# Patient Record
Sex: FEMALE | Race: WHITE | URBAN - METROPOLITAN AREA
[De-identification: names, ages, dates, MRNs, and addresses within clinical notes are randomized per-mention and may not be internally consistent; named-entity substitution may affect disease eponyms.]

---
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
## 2023-01-09 ENCOUNTER — OUTPATIENT (OUTPATIENT)
Dept: OUTPATIENT SERVICES | Age: 18
LOS: 1 days | Discharge: HOME | End: 2023-01-09

## 2023-01-09 DIAGNOSIS — Q69.9 POLYDACTYLY, UNSPECIFIED: Chronic | ICD-10-CM

## 2023-01-11 ENCOUNTER — RESULT REVIEW (OUTPATIENT)
Age: 18
End: 2023-01-11

## 2023-01-11 ENCOUNTER — APPOINTMENT (OUTPATIENT)
Dept: PEDIATRIC HEMATOLOGY/ONCOLOGY | Facility: CLINIC | Age: 18
End: 2023-01-11
Payer: COMMERCIAL

## 2023-01-11 VITALS
BODY MASS INDEX: 23.66 KG/M2 | WEIGHT: 136.91 LBS | TEMPERATURE: 98.24 F | OXYGEN SATURATION: 100 % | HEIGHT: 63.66 IN | RESPIRATION RATE: 24 BRPM | DIASTOLIC BLOOD PRESSURE: 64 MMHG | SYSTOLIC BLOOD PRESSURE: 112 MMHG | HEART RATE: 88 BPM

## 2023-01-11 LAB
ALBUMIN SERPL ELPH-MCNC: 4.7 G/DL — SIGNIFICANT CHANGE UP (ref 3.3–5)
ALP SERPL-CCNC: 60 U/L — SIGNIFICANT CHANGE UP (ref 40–120)
ALT FLD-CCNC: 17 U/L — SIGNIFICANT CHANGE UP (ref 4–33)
ANION GAP SERPL CALC-SCNC: 12 MMOL/L — SIGNIFICANT CHANGE UP (ref 7–14)
AST SERPL-CCNC: 25 U/L — SIGNIFICANT CHANGE UP (ref 4–32)
BASOPHILS # BLD AUTO: 0.03 K/UL — SIGNIFICANT CHANGE UP (ref 0–0.2)
BASOPHILS NFR BLD AUTO: 0.9 % — SIGNIFICANT CHANGE UP (ref 0–2)
BILIRUB SERPL-MCNC: 0.6 MG/DL — SIGNIFICANT CHANGE UP (ref 0.2–1.2)
BUN SERPL-MCNC: 8 MG/DL — SIGNIFICANT CHANGE UP (ref 7–23)
CALCIUM SERPL-MCNC: 9.3 MG/DL — SIGNIFICANT CHANGE UP (ref 8.4–10.5)
CHLORIDE SERPL-SCNC: 103 MMOL/L — SIGNIFICANT CHANGE UP (ref 98–107)
CO2 SERPL-SCNC: 25 MMOL/L — SIGNIFICANT CHANGE UP (ref 22–31)
CREAT SERPL-MCNC: 0.64 MG/DL — SIGNIFICANT CHANGE UP (ref 0.5–1.3)
EGFR: 130 ML/MIN/1.73M2 — SIGNIFICANT CHANGE UP
EOSINOPHIL # BLD AUTO: 0.04 K/UL — SIGNIFICANT CHANGE UP (ref 0–0.5)
EOSINOPHIL NFR BLD AUTO: 1.2 % — SIGNIFICANT CHANGE UP (ref 0–6)
FERRITIN SERPL-MCNC: 7 NG/ML — LOW (ref 15–150)
GLUCOSE SERPL-MCNC: 72 MG/DL — SIGNIFICANT CHANGE UP (ref 70–99)
HCT VFR BLD CALC: 33.7 % — LOW (ref 34.5–45)
HGB BLD-MCNC: 10.5 G/DL — LOW (ref 11.5–15.5)
IANC: 1.05 K/UL — LOW (ref 1.8–7.4)
IMM GRANULOCYTES NFR BLD AUTO: 0.3 % — SIGNIFICANT CHANGE UP (ref 0–0.9)
IRON SATN MFR SERPL: 14 % — SIGNIFICANT CHANGE UP (ref 14–50)
IRON SATN MFR SERPL: 55 UG/DL — SIGNIFICANT CHANGE UP (ref 30–160)
LYMPHOCYTES # BLD AUTO: 1.96 K/UL — SIGNIFICANT CHANGE UP (ref 1–3.3)
LYMPHOCYTES # BLD AUTO: 58.3 % — HIGH (ref 13–44)
MCHC RBC-ENTMCNC: 24.9 PG — LOW (ref 27–34)
MCHC RBC-ENTMCNC: 31.2 GM/DL — LOW (ref 32–36)
MCV RBC AUTO: 80 FL — SIGNIFICANT CHANGE UP (ref 80–100)
MONOCYTES # BLD AUTO: 0.27 K/UL — SIGNIFICANT CHANGE UP (ref 0–0.9)
MONOCYTES NFR BLD AUTO: 8 % — SIGNIFICANT CHANGE UP (ref 2–14)
NEUTROPHILS # BLD AUTO: 1.05 K/UL — LOW (ref 1.8–7.4)
NEUTROPHILS NFR BLD AUTO: 31.3 % — LOW (ref 43–77)
NRBC # BLD: 0 /100 WBCS — SIGNIFICANT CHANGE UP (ref 0–0)
PLATELET # BLD AUTO: 122 K/UL — LOW (ref 150–400)
POTASSIUM SERPL-MCNC: 4 MMOL/L — SIGNIFICANT CHANGE UP (ref 3.5–5.3)
POTASSIUM SERPL-SCNC: 4 MMOL/L — SIGNIFICANT CHANGE UP (ref 3.5–5.3)
PROT SERPL-MCNC: 7.1 G/DL — SIGNIFICANT CHANGE UP (ref 6–8.3)
RBC # BLD: 4.21 M/UL — SIGNIFICANT CHANGE UP (ref 3.8–5.2)
RBC # BLD: 4.21 M/UL — SIGNIFICANT CHANGE UP (ref 3.8–5.2)
RBC # FLD: 16.2 % — HIGH (ref 10.3–14.5)
RETICS #: 39.2 K/UL — SIGNIFICANT CHANGE UP (ref 25–125)
RETICS/RBC NFR: 0.9 % — SIGNIFICANT CHANGE UP (ref 0.5–2.5)
SODIUM SERPL-SCNC: 140 MMOL/L — SIGNIFICANT CHANGE UP (ref 135–145)
TIBC SERPL-MCNC: 393 UG/DL — SIGNIFICANT CHANGE UP (ref 220–430)
UIBC SERPL-MCNC: 338 UG/DL — SIGNIFICANT CHANGE UP (ref 110–370)
WBC # BLD: 3.36 K/UL — LOW (ref 3.8–10.5)
WBC # FLD AUTO: 3.36 K/UL — LOW (ref 3.8–10.5)

## 2023-01-11 PROCEDURE — 99214 OFFICE O/P EST MOD 30 MIN: CPT

## 2023-01-12 DIAGNOSIS — Z51.11 ENCOUNTER FOR ANTINEOPLASTIC CHEMOTHERAPY: ICD-10-CM

## 2023-01-12 DIAGNOSIS — D70.1 AGRANULOCYTOSIS SECONDARY TO CANCER CHEMOTHERAPY: ICD-10-CM

## 2023-01-12 DIAGNOSIS — D50.8 OTHER IRON DEFICIENCY ANEMIAS: ICD-10-CM

## 2023-01-12 DIAGNOSIS — C92.10 CHRONIC MYELOID LEUKEMIA, BCR/ABL-POSITIVE, NOT HAVING ACHIEVED REMISSION: ICD-10-CM

## 2023-01-26 NOTE — CONSULT LETTER
[Dear  ___] : Dear  [unfilled], [Courtesy Letter:] : I had the pleasure of seeing your patient, [unfilled], in my office today. [Please see my note below.] : Please see my note below. [Consult Closing:] : Thank you very much for allowing me to participate in the care of this patient.  If you have any questions, please do not hesitate to contact me. [Sincerely,] : Sincerely, [FreeTextEntry2] : Meagan Fu MD\par [address]\[place] [address] [FreeTextEntry3] : RAGHU Dumas\par Attending Physician, Pediatric Hematology/Oncology\par [facility]\par , [employer]\par Email: [email or web address]\par

## 2023-01-26 NOTE — HISTORY OF PRESENT ILLNESS
[No Feeding Issues] : no feeding issues at this time [de-identified] : Sachin is a 17 year old girl who follows in our clinic for chronic myeloid leukemia and chemotherapy management.\par She was diagnosed in December 2019, treated with Dasatinib for one year but had a suboptimal response at 12 months (BM BCR ABL RT PCR 0.214%).\par She was then enrolled on [id number] - a Phase 1/2 trial for Bosutinib and began treatment on 3/5/21. She opted out of the study on 6/25/21 owing to GI side effects.\par She then restarted treatment with Dasatinib in August 2021 with dose reduction (80% dose)\par \par DISEASE SUMMARY:\par DIAGNOSIS: Chronic Myeloid Leukemia\par DIAGNOSED: 12/20/2019\par PHASE AT DIAGNOSIS: Chronic phase, 0.5% blasts in peripheral blood and bone marrow\par HEMPATH: Hypercellularity, hypo ablated nucleus containing megakaryocytes, increased M:E ratio of 4:1, 1% blasts present\par PERIPHERAL BLOOD BCR-ABL qRT PCR AT DIAGNOSIS: > 10% on International Scale\par \par DISEASE SURVEILLANCE:\par ON DASATINIB:\par 12/21 - Chronic phase CML diagnosed, 0.5% blasts, PB qRT PCR > 10% on IS\par 1 MONTH POST DIAGNOSIS - PB RT-PCR > 10% (57%)\par 3 MONTH POST DIAGNOSIS - PB RT-PCR 0.73%, BM RT-PCR 1.015%\par 6 MONTH POST DIAGNOSIS - PB RT-PCR 1.060%\par 12 MONTH POST DIAGNOSIS BONE MARROW RT -PCR 0.214% (Suboptimal/warning). Continues to have dose dependent hematologic toxicity. \par \par ON BOSUTINIB:\par 02/2021 - Enrolled on [id number] Phase 1/2 Bosutinib study due to suboptimal response and intolerance to Dasatinib, started treatment 3/5/21, phase 1 400mg/m2\par 03/2021 - Dose reduced Bosutinib due to AE, 350mg/m2 (restarted 3/25/21) \par 5/11/2021- Dose reduced bosutinib due to AEs, 300mg/m2\par Bone marrow testing after cycle 3 of bosutinib showed PCR of 0.2%. \par 6/25/21 - Opted out of study and discontinue Bosutinib due to GI toxicity\par \par RESTARTED DASATINIB at 80 mg daily in August 2021\par Jan 2022 - Bone marrow BCR ABL 1.1 %, increased from previously, due to non compliance\par Feb 2022 - Peripheral Blood BCR ABL 0.06%, now compliant. \par June 2022 - Peripheral blood BCR ABL 0.013% [de-identified] : Sachin is here for follow up of her CML\par She is doing well and is asymptomatic. In school.\par She continues to take her Dasatinib. She said that she is taking it daily every night consistently. No forgotten pills\par Her periods continue to be every month, last for 4-5 days and uses 4-5 pads/day\par She reports baseline activity and appetite

## 2023-01-26 NOTE — REVIEW OF SYSTEMS
[Negative] : Allergic/Immunologic [Immunizations are up to date by report] : Immunizations are up to date by report [de-identified] : no rash  [FreeTextEntry1] : CML on therapy [FreeTextEntry8] : see history

## 2023-02-21 ENCOUNTER — INPATIENT (INPATIENT)
Age: 18
LOS: 3 days | Discharge: HOME | End: 2023-02-25
Attending: STUDENT IN AN ORGANIZED HEALTH CARE EDUCATION/TRAINING PROGRAM | Admitting: STUDENT IN AN ORGANIZED HEALTH CARE EDUCATION/TRAINING PROGRAM
Payer: COMMERCIAL

## 2023-02-21 ENCOUNTER — TRANSCRIPTION ENCOUNTER (OUTPATIENT)
Age: 18
End: 2023-02-21

## 2023-02-21 ENCOUNTER — NON-APPOINTMENT (OUTPATIENT)
Age: 18
End: 2023-02-21

## 2023-02-21 VITALS
DIASTOLIC BLOOD PRESSURE: 70 MMHG | OXYGEN SATURATION: 100 % | RESPIRATION RATE: 24 BRPM | TEMPERATURE: 101 F | HEART RATE: 124 BPM | SYSTOLIC BLOOD PRESSURE: 104 MMHG | WEIGHT: 138.01 LBS

## 2023-02-21 DIAGNOSIS — Q69.9 POLYDACTYLY, UNSPECIFIED: Chronic | ICD-10-CM

## 2023-02-21 DIAGNOSIS — R50.9 FEVER, UNSPECIFIED: ICD-10-CM

## 2023-02-21 LAB
ALBUMIN SERPL ELPH-MCNC: 4.2 G/DL — SIGNIFICANT CHANGE UP (ref 3.3–5)
ALP SERPL-CCNC: 65 U/L — SIGNIFICANT CHANGE UP (ref 40–120)
ALT FLD-CCNC: 21 U/L — SIGNIFICANT CHANGE UP (ref 4–33)
ANION GAP SERPL CALC-SCNC: 13 MMOL/L — SIGNIFICANT CHANGE UP (ref 7–14)
ANISOCYTOSIS BLD QL: SLIGHT — SIGNIFICANT CHANGE UP
APPEARANCE UR: CLEAR — SIGNIFICANT CHANGE UP
AST SERPL-CCNC: 24 U/L — SIGNIFICANT CHANGE UP (ref 4–32)
B PERT DNA SPEC QL NAA+PROBE: SIGNIFICANT CHANGE UP
B PERT+PARAPERT DNA PNL SPEC NAA+PROBE: SIGNIFICANT CHANGE UP
BACTERIA # UR AUTO: ABNORMAL
BASOPHILS # BLD AUTO: 0.09 K/UL — SIGNIFICANT CHANGE UP (ref 0–0.2)
BASOPHILS NFR BLD AUTO: 1 % — SIGNIFICANT CHANGE UP (ref 0–2)
BILIRUB SERPL-MCNC: 0.8 MG/DL — SIGNIFICANT CHANGE UP (ref 0.2–1.2)
BILIRUB UR-MCNC: NEGATIVE — SIGNIFICANT CHANGE UP
BLD GP AB SCN SERPL QL: NEGATIVE — SIGNIFICANT CHANGE UP
BORDETELLA PARAPERTUSSIS (RAPRVP): SIGNIFICANT CHANGE UP
BUN SERPL-MCNC: 9 MG/DL — SIGNIFICANT CHANGE UP (ref 7–23)
C PNEUM DNA SPEC QL NAA+PROBE: SIGNIFICANT CHANGE UP
CALCIUM SERPL-MCNC: 8.8 MG/DL — SIGNIFICANT CHANGE UP (ref 8.4–10.5)
CHLORIDE SERPL-SCNC: 96 MMOL/L — LOW (ref 98–107)
CO2 SERPL-SCNC: 22 MMOL/L — SIGNIFICANT CHANGE UP (ref 22–31)
COLOR SPEC: YELLOW — SIGNIFICANT CHANGE UP
CREAT SERPL-MCNC: 0.66 MG/DL — SIGNIFICANT CHANGE UP (ref 0.5–1.3)
DIFF PNL FLD: ABNORMAL
EGFR: 130 ML/MIN/1.73M2 — SIGNIFICANT CHANGE UP
EOSINOPHIL # BLD AUTO: 0 K/UL — SIGNIFICANT CHANGE UP (ref 0–0.5)
EOSINOPHIL NFR BLD AUTO: 0 % — SIGNIFICANT CHANGE UP (ref 0–6)
EPI CELLS # UR: SIGNIFICANT CHANGE UP
FLUAV SUBTYP SPEC NAA+PROBE: SIGNIFICANT CHANGE UP
FLUBV RNA SPEC QL NAA+PROBE: SIGNIFICANT CHANGE UP
GIANT PLATELETS BLD QL SMEAR: PRESENT — SIGNIFICANT CHANGE UP
GLUCOSE SERPL-MCNC: 115 MG/DL — HIGH (ref 70–99)
GLUCOSE UR QL: NEGATIVE — SIGNIFICANT CHANGE UP
HADV DNA SPEC QL NAA+PROBE: SIGNIFICANT CHANGE UP
HCOV 229E RNA SPEC QL NAA+PROBE: SIGNIFICANT CHANGE UP
HCOV HKU1 RNA SPEC QL NAA+PROBE: SIGNIFICANT CHANGE UP
HCOV NL63 RNA SPEC QL NAA+PROBE: SIGNIFICANT CHANGE UP
HCOV OC43 RNA SPEC QL NAA+PROBE: SIGNIFICANT CHANGE UP
HCT VFR BLD CALC: 31.5 % — LOW (ref 34.5–45)
HGB BLD-MCNC: 9.8 G/DL — LOW (ref 11.5–15.5)
HMPV RNA SPEC QL NAA+PROBE: SIGNIFICANT CHANGE UP
HPIV1 RNA SPEC QL NAA+PROBE: SIGNIFICANT CHANGE UP
HPIV2 RNA SPEC QL NAA+PROBE: SIGNIFICANT CHANGE UP
HPIV3 RNA SPEC QL NAA+PROBE: SIGNIFICANT CHANGE UP
HPIV4 RNA SPEC QL NAA+PROBE: SIGNIFICANT CHANGE UP
IANC: 6.34 K/UL — SIGNIFICANT CHANGE UP (ref 1.8–7.4)
KETONES UR-MCNC: ABNORMAL
LEUKOCYTE ESTERASE UR-ACNC: NEGATIVE — SIGNIFICANT CHANGE UP
LG PLATELETS BLD QL AUTO: SLIGHT — SIGNIFICANT CHANGE UP
LYMPHOCYTES # BLD AUTO: 1.75 K/UL — SIGNIFICANT CHANGE UP (ref 1–3.3)
LYMPHOCYTES # BLD AUTO: 20 % — SIGNIFICANT CHANGE UP (ref 13–44)
M PNEUMO DNA SPEC QL NAA+PROBE: SIGNIFICANT CHANGE UP
MANUAL SMEAR VERIFICATION: SIGNIFICANT CHANGE UP
MCHC RBC-ENTMCNC: 23.8 PG — LOW (ref 27–34)
MCHC RBC-ENTMCNC: 31.1 GM/DL — LOW (ref 32–36)
MCV RBC AUTO: 76.6 FL — LOW (ref 80–100)
METAMYELOCYTES # FLD: 1 % — SIGNIFICANT CHANGE UP (ref 0–1)
MICROCYTES BLD QL: SLIGHT — SIGNIFICANT CHANGE UP
MONOCYTES # BLD AUTO: 0.53 K/UL — SIGNIFICANT CHANGE UP (ref 0–0.9)
MONOCYTES NFR BLD AUTO: 6 % — SIGNIFICANT CHANGE UP (ref 2–14)
NEUTROPHILS # BLD AUTO: 6.3 K/UL — SIGNIFICANT CHANGE UP (ref 1.8–7.4)
NEUTROPHILS NFR BLD AUTO: 66 % — SIGNIFICANT CHANGE UP (ref 43–77)
NEUTS BAND # BLD: 6 % — SIGNIFICANT CHANGE UP (ref 0–6)
NITRITE UR-MCNC: NEGATIVE — SIGNIFICANT CHANGE UP
NRBC # BLD: 0 /100 — SIGNIFICANT CHANGE UP (ref 0–0)
PH UR: 6.5 — SIGNIFICANT CHANGE UP (ref 5–8)
PLAT MORPH BLD: ABNORMAL
PLATELET # BLD AUTO: 136 K/UL — LOW (ref 150–400)
PLATELET COUNT - ESTIMATE: ABNORMAL
POLYCHROMASIA BLD QL SMEAR: SLIGHT — SIGNIFICANT CHANGE UP
POTASSIUM SERPL-MCNC: 3.6 MMOL/L — SIGNIFICANT CHANGE UP (ref 3.5–5.3)
POTASSIUM SERPL-SCNC: 3.6 MMOL/L — SIGNIFICANT CHANGE UP (ref 3.5–5.3)
PROT SERPL-MCNC: 7.5 G/DL — SIGNIFICANT CHANGE UP (ref 6–8.3)
PROT UR-MCNC: ABNORMAL
RAPID RVP RESULT: SIGNIFICANT CHANGE UP
RBC # BLD: 4.11 M/UL — SIGNIFICANT CHANGE UP (ref 3.8–5.2)
RBC # FLD: 16.4 % — HIGH (ref 10.3–14.5)
RBC BLD AUTO: ABNORMAL
RBC CASTS # UR COMP ASSIST: >50 /HPF — SIGNIFICANT CHANGE UP (ref 0–4)
RH IG SCN BLD-IMP: POSITIVE — SIGNIFICANT CHANGE UP
RSV RNA SPEC QL NAA+PROBE: SIGNIFICANT CHANGE UP
RV+EV RNA SPEC QL NAA+PROBE: SIGNIFICANT CHANGE UP
SARS-COV-2 RNA SPEC QL NAA+PROBE: SIGNIFICANT CHANGE UP
SODIUM SERPL-SCNC: 131 MMOL/L — LOW (ref 135–145)
SP GR SPEC: 1.02 — SIGNIFICANT CHANGE UP (ref 1.01–1.05)
UROBILINOGEN FLD QL: SIGNIFICANT CHANGE UP
WBC # BLD: 8.75 K/UL — SIGNIFICANT CHANGE UP (ref 3.8–10.5)
WBC # FLD AUTO: 8.75 K/UL — SIGNIFICANT CHANGE UP (ref 3.8–10.5)
WBC UR QL: SIGNIFICANT CHANGE UP /HPF (ref 0–5)

## 2023-02-21 PROCEDURE — 93010 ELECTROCARDIOGRAM REPORT: CPT

## 2023-02-21 PROCEDURE — 99285 EMERGENCY DEPT VISIT HI MDM: CPT

## 2023-02-21 PROCEDURE — 71046 X-RAY EXAM CHEST 2 VIEWS: CPT | Mod: 26

## 2023-02-21 RX ORDER — SODIUM CHLORIDE 9 MG/ML
1000 INJECTION INTRAMUSCULAR; INTRAVENOUS; SUBCUTANEOUS ONCE
Refills: 0 | Status: COMPLETED | OUTPATIENT
Start: 2023-02-21 | End: 2023-02-21

## 2023-02-21 RX ORDER — CEFTRIAXONE 500 MG/1
2000 INJECTION, POWDER, FOR SOLUTION INTRAMUSCULAR; INTRAVENOUS EVERY 24 HOURS
Refills: 0 | Status: DISCONTINUED | OUTPATIENT
Start: 2023-02-22 | End: 2023-02-22

## 2023-02-21 RX ORDER — ACETAMINOPHEN 500 MG
650 TABLET ORAL ONCE
Refills: 0 | Status: DISCONTINUED | OUTPATIENT
Start: 2023-02-21 | End: 2023-02-21

## 2023-02-21 RX ORDER — ACETAMINOPHEN 500 MG
650 TABLET ORAL ONCE
Refills: 0 | Status: COMPLETED | OUTPATIENT
Start: 2023-02-21 | End: 2023-02-21

## 2023-02-21 RX ORDER — CEFTRIAXONE 500 MG/1
2000 INJECTION, POWDER, FOR SOLUTION INTRAMUSCULAR; INTRAVENOUS ONCE
Refills: 0 | Status: COMPLETED | OUTPATIENT
Start: 2023-02-21 | End: 2023-02-21

## 2023-02-21 RX ORDER — METOCLOPRAMIDE HCL 10 MG
10 TABLET ORAL ONCE
Refills: 0 | Status: COMPLETED | OUTPATIENT
Start: 2023-02-21 | End: 2023-02-21

## 2023-02-21 RX ORDER — LEVOFLOXACIN 5 MG/ML
1 INJECTION, SOLUTION INTRAVENOUS
Qty: 1 | Refills: 0
Start: 2023-02-21 | End: 2023-02-21

## 2023-02-21 RX ORDER — SODIUM CHLORIDE 9 MG/ML
1000 INJECTION, SOLUTION INTRAVENOUS
Refills: 0 | Status: DISCONTINUED | OUTPATIENT
Start: 2023-02-21 | End: 2023-02-25

## 2023-02-21 RX ADMIN — SODIUM CHLORIDE 1000 MILLILITER(S): 9 INJECTION INTRAMUSCULAR; INTRAVENOUS; SUBCUTANEOUS at 19:58

## 2023-02-21 RX ADMIN — Medication 650 MILLIGRAM(S): at 15:56

## 2023-02-21 RX ADMIN — CEFTRIAXONE 100 MILLIGRAM(S): 500 INJECTION, POWDER, FOR SOLUTION INTRAMUSCULAR; INTRAVENOUS at 10:30

## 2023-02-21 RX ADMIN — Medication 650 MILLIGRAM(S): at 20:17

## 2023-02-21 RX ADMIN — SODIUM CHLORIDE 2000 MILLILITER(S): 9 INJECTION INTRAMUSCULAR; INTRAVENOUS; SUBCUTANEOUS at 10:48

## 2023-02-21 RX ADMIN — Medication 8 MILLIGRAM(S): at 17:41

## 2023-02-21 NOTE — ED PEDIATRIC TRIAGE NOTE - CHIEF COMPLAINT QUOTE
mom states "she been running a fever since saturday, took her to pediatrican , tested for covid and flu was neg, told to keep her home, called doctor this morning and told to come" pt alert, BCR, hx: CML

## 2023-02-21 NOTE — ED PROVIDER NOTE - NSICDXPASTSURGICALHX_GEN_ALL_CORE_FT
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

## 2023-02-21 NOTE — ED PEDIATRIC NURSE REASSESSMENT NOTE - NS ED NURSE REASSESS COMMENT FT2
Report received from GREG Breaux RN for break coverage
pt awake and alert, tylenol given. no signs of pain or distress. side rails are up, call bell within reach. mom at bedside
Pt denies pain or discomfort, safety precautions maintained. Waiting for improvement in HR
Pt well appearing, denies pain or discomfort. Safety precautions maintained, verbalized understanding of care

## 2023-02-21 NOTE — ED PROVIDER NOTE - PROGRESS NOTE DETAILS
Nuha, PGY2: Blood work and urine non actionable at this time. Will send one dose of levaquin PO for  tomorrow. On reassessment patient endorses having a HA->will order tylenol. Will revital and discharge. reassessed prior to dispo, rash seems to have gotten worse, warm to touch, tender, blanches w/ pressure, unclear etiology of rash, 2/2 motrin, oral medications at home? discussed w/ onc and now discussing with their attending Elise Perlman, MD - Attending Physician onc recs monitoring at home and return for worsening symptoms, remains well, tolrated PO Elise Perlman, MD - Attending Physician Nuha, PGY2: Patient reassessed and found with persistent tachycardia and BP in the 90s/50s. Will give a bolus and reassess vitals. Nuha, PGY2: Patient reassessed and found with persistent tachycardia and BP in the 90s/50s. Will give a bolus and reassess vitals.  Also with 99.6 oral temp. Still tachycardic.  Will give tylenol and continue fluids.  (avoiding ibuprofen because of potential reaction over the weekend).  Nito Laguerre MD Lucila Rodriguez, Attending Physician: Pt signed out to me by Dr. Laguerre awaiting bed assignment. Pt tachycardic to 117 but overall remains well appearing. Pt complaining of chest pain, worse with coughing. Will order Tylenol.

## 2023-02-21 NOTE — ED PROVIDER NOTE - NSFOLLOWUPINSTRUCTIONS_ED_ALL_ED_FT
Please follow up with the pediatrician and heme/onc for further management.     Continue taking tylenol for fevers and pain control.     Levaquin was sent to the pharmacy for one day dose for tomorrow.     Please return to the ED for lethargy, poor urine output, intractable vomiting, or any other concerns.

## 2023-02-21 NOTE — ED PROVIDER NOTE - PHYSICAL EXAMINATION
General: Alert and active, good hygiene, well developed  HENT: Atraumatic, PERRLA, no conjunctivae injection, patent ear canal and normal TM.  Cardiovascular: RRR, S1&2, no M or R  Respiratory: CTABL, no wheezes or crackles, no decreased breath sounds  Abdominal:  soft and non-tender non distended  Extremities: no edema of the legs/feet  Skin: warm, well perfused, cap refill<3sec.  Erythematous and macular rash visualized over bilateral lower quadrants of the abdomen that wraps around lower back.  No vesicles or papules seen. General: Alert and active, good hygiene, well developed  HENT: Atraumatic, PERRLA, no conjunctivae injection, patent ear canal and normal TM.  Cardiovascular: RRR, S1&2, no M or R  Respiratory: CTABL, no wheezes or crackles, no decreased breath sounds  Abdominal:  soft and non-tender non distended  Extremities: no edema of the legs/feet  Skin: warm, well perfused, cap refill<3sec.  faint Erythematous and macular rash visualized over bilateral lower quadrants of the abdomen that wraps around lower back.  No vesicles or papules seen. states not pruritic doesn't hurt and improving from prior General: Alert and active, good hygiene, well developed  HENT: Atraumatic, PERRLA, no conjunctivae injection, patent ear canal and normal TM.  Cardiovascular: RRR, S1&2, no M or R  Respiratory: CTABL, no wheezes or crackles, no decreased breath sounds  Abdominal:  soft and non-tender non distended  Extremities: no edema of the legs/feet  Skin: warm, well perfused, cap refill<3sec.  faint Erythematous and macular rash visualized over bilateral lower quadrants of the abdomen that wraps around lower back.  No vesicles or papules seen. states not pruritic doesn't hurt and improving from prior  neuro: CN2-12 intact, normal finger to nose, motor strength 5/5 in both upper and lower extremities

## 2023-02-21 NOTE — ED PROVIDER NOTE - ATTENDING CONTRIBUTION TO CARE
I personally performed a history and physical exam of the patient and discussed their management with the resident/fellow/AMANUEL. I reviewed the resident/fellow/AMANUEL's note and agree with the documented findings and plan of care. I made modifications to the above information as I felt appropriate. I was present for and directly supervised any procedure(s) as documented above or in the procedure note. I personally reviewed labwork/imaging if they were obtained and discussed management with the resident/fellow/AMANUEL.  Plan and care discussed in length with family, provided anticipatory guidance and answered all questions. Please see MDM which I have read, reviewed and edited as necessary to reflect my assessment/plan of the patient and decision making. Please also review progress notes for updates on patient care/labs/consults and ED course after initial presentation.  Elise Perlman, MD Attending Physician  ------------------------------------------------------------------------------------------------------------------

## 2023-02-21 NOTE — ED PROVIDER NOTE - CLINICAL SUMMARY MEDICAL DECISION MAKING FREE TEXT BOX
17-year-old female patient past medical history of CML on Sprycel who was brought in for fevers, rash, mild cough.  On exam, found febrile and tachycardic.  Diffuse macular erythematous rash seen over lower abdomen and radiates to lower back.  Will assess for neutropenic fever with blood work, and will begin to empirically treat with Rocephin. 17-year-old female patient past medical history of CML on Sprycel who was brought in for fevers, rash, mild cough.  On exam, found febrile and tachycardic.  Diffuse macular erythematous rash seen over lower abdomen and back but otherwise no other symptoms or focal findings. plan for labs,fluids, CTX given functionally immunocompromised, likely viral -   Will assess for neutropenic fever and escalated abx as needed, dispo pending work up     ------------------------------------------------------------------------------------------------------------------  edited by Elise Perlman MD - Attending Physician  Please see progress notes for status/labs/consult updates and ED course after initial presentation  ------------------------------------------------------------------------------------------------------------------

## 2023-02-21 NOTE — ED PROVIDER NOTE - OBJECTIVE STATEMENT
17-year-old female patient with past medical history of CML on Sprycel was brought into the ED for fevers that began around 4 days ago.  Patient was seen at pediatrician and had flu and COVID swab performed which have resulted negative.  Of note, patient has been experiencing poor appetite and a mild cough.  Fever has been persistent and has been moderately controlled with Tylenol at home.  Advil intake at home caused a diffuse rash which she discontinued.  Patient has since Sunday experienced diffuse rash over lower abdomen that radiated around back described as erythematous and macular. 17-year-old female patient with past medical history of CML on Sprycel was brought into the ED for fevers that began around 4 days ago. Patient was seen at pediatrician and had flu and COVID swab performed which have resulted negative. Of note, patient has been experiencing poor appetite and a mild cough.  Fever has been persistent and has been moderately controlled with Tylenol at home. Advil intake at home caused a diffuse rash which she discontinued.  Patient has since Sunday experienced diffuse rash over lower abdomen that radiated around back described as erythematous and macular. No missed doses. No other complaints at this time. normal UOP and stools 17-year-old female patient with past medical history of CML on Sprycel was brought into the ED for fevers that began around 4 days ago. Patient was seen at pediatrician and had flu and COVID swab performed which have resulted negative. Of note, patient has been experiencing poor appetite and a mild cough.  Fever has been persistent and has been moderately controlled with Tylenol at home. Advil intake at home caused a diffuse rash which she discontinued.  Patient has since Sunday experienced diffuse rash over lower abdomen that radiated around back described as erythematous and macular x 2 days, initially noticed by mother but not bothersome to her, never happened before attributed to motrin. No missed doses. No other complaints at this time. normal UOP and stools

## 2023-02-22 LAB
ALBUMIN SERPL ELPH-MCNC: 3.4 G/DL — SIGNIFICANT CHANGE UP (ref 3.3–5)
ALP SERPL-CCNC: 61 U/L — SIGNIFICANT CHANGE UP (ref 40–120)
ALT FLD-CCNC: 14 U/L — SIGNIFICANT CHANGE UP (ref 4–33)
ANION GAP SERPL CALC-SCNC: 10 MMOL/L — SIGNIFICANT CHANGE UP (ref 7–14)
ASO AB SER QL: 157 IU/ML — SIGNIFICANT CHANGE UP (ref 20–200)
AST SERPL-CCNC: 13 U/L — SIGNIFICANT CHANGE UP (ref 4–32)
BASOPHILS # BLD AUTO: 0.01 K/UL — SIGNIFICANT CHANGE UP (ref 0–0.2)
BASOPHILS NFR BLD AUTO: 0.2 % — SIGNIFICANT CHANGE UP (ref 0–2)
BILIRUB SERPL-MCNC: 0.4 MG/DL — SIGNIFICANT CHANGE UP (ref 0.2–1.2)
BUN SERPL-MCNC: 6 MG/DL — LOW (ref 7–23)
CALCIUM SERPL-MCNC: 7.9 MG/DL — LOW (ref 8.4–10.5)
CHLORIDE SERPL-SCNC: 106 MMOL/L — SIGNIFICANT CHANGE UP (ref 98–107)
CK MB CFR SERPL CALC: <1 NG/ML — SIGNIFICANT CHANGE UP
CO2 SERPL-SCNC: 20 MMOL/L — LOW (ref 22–31)
CREAT SERPL-MCNC: 0.46 MG/DL — LOW (ref 0.5–1.3)
CRP SERPL-MCNC: 133.7 MG/L — HIGH
CULTURE RESULTS: SIGNIFICANT CHANGE UP
D DIMER BLD IA.RAPID-MCNC: 670 NG/ML DDU — HIGH
EGFR: 141 ML/MIN/1.73M2 — SIGNIFICANT CHANGE UP
EOSINOPHIL # BLD AUTO: 0 K/UL — SIGNIFICANT CHANGE UP (ref 0–0.5)
EOSINOPHIL NFR BLD AUTO: 0 % — SIGNIFICANT CHANGE UP (ref 0–6)
ERYTHROCYTE [SEDIMENTATION RATE] IN BLOOD: 54 MM/HR — HIGH (ref 4–25)
GLUCOSE SERPL-MCNC: 102 MG/DL — HIGH (ref 70–99)
HCT VFR BLD CALC: 26.3 % — LOW (ref 34.5–45)
HGB BLD-MCNC: 8 G/DL — LOW (ref 11.5–15.5)
IANC: 4.68 K/UL — SIGNIFICANT CHANGE UP (ref 1.8–7.4)
IMM GRANULOCYTES NFR BLD AUTO: 1.1 % — HIGH (ref 0–0.9)
LYMPHOCYTES # BLD AUTO: 1 K/UL — SIGNIFICANT CHANGE UP (ref 1–3.3)
LYMPHOCYTES # BLD AUTO: 15.9 % — SIGNIFICANT CHANGE UP (ref 13–44)
MAGNESIUM SERPL-MCNC: 1.9 MG/DL — SIGNIFICANT CHANGE UP (ref 1.6–2.6)
MCHC RBC-ENTMCNC: 23.5 PG — LOW (ref 27–34)
MCHC RBC-ENTMCNC: 30.4 GM/DL — LOW (ref 32–36)
MCV RBC AUTO: 77.4 FL — LOW (ref 80–100)
MONOCYTES # BLD AUTO: 0.53 K/UL — SIGNIFICANT CHANGE UP (ref 0–0.9)
MONOCYTES NFR BLD AUTO: 8.4 % — SIGNIFICANT CHANGE UP (ref 2–14)
NEUTROPHILS # BLD AUTO: 4.68 K/UL — SIGNIFICANT CHANGE UP (ref 1.8–7.4)
NEUTROPHILS NFR BLD AUTO: 74.4 % — SIGNIFICANT CHANGE UP (ref 43–77)
NRBC # BLD: 0 /100 WBCS — SIGNIFICANT CHANGE UP (ref 0–0)
NRBC # FLD: 0 K/UL — SIGNIFICANT CHANGE UP (ref 0–0)
PHOSPHATE SERPL-MCNC: 1.7 MG/DL — LOW (ref 2.5–4.5)
PLATELET # BLD AUTO: 102 K/UL — LOW (ref 150–400)
POTASSIUM SERPL-MCNC: 3.5 MMOL/L — SIGNIFICANT CHANGE UP (ref 3.5–5.3)
POTASSIUM SERPL-SCNC: 3.5 MMOL/L — SIGNIFICANT CHANGE UP (ref 3.5–5.3)
PROT SERPL-MCNC: 6.4 G/DL — SIGNIFICANT CHANGE UP (ref 6–8.3)
RBC # BLD: 3.4 M/UL — LOW (ref 3.8–5.2)
RBC # FLD: 16.9 % — HIGH (ref 10.3–14.5)
SODIUM SERPL-SCNC: 136 MMOL/L — SIGNIFICANT CHANGE UP (ref 135–145)
SPECIMEN SOURCE: SIGNIFICANT CHANGE UP
TROPONIN T, HIGH SENSITIVITY RESULT: 8 NG/L — SIGNIFICANT CHANGE UP
TROPONIN T, HIGH SENSITIVITY RESULT: <6 NG/L — SIGNIFICANT CHANGE UP
WBC # BLD: 6.29 K/UL — SIGNIFICANT CHANGE UP (ref 3.8–10.5)
WBC # FLD AUTO: 6.29 K/UL — SIGNIFICANT CHANGE UP (ref 3.8–10.5)

## 2023-02-22 PROCEDURE — 76705 ECHO EXAM OF ABDOMEN: CPT | Mod: 26

## 2023-02-22 PROCEDURE — 99223 1ST HOSP IP/OBS HIGH 75: CPT | Mod: GC

## 2023-02-22 PROCEDURE — 74177 CT ABD & PELVIS W/CONTRAST: CPT | Mod: 26

## 2023-02-22 PROCEDURE — 76856 US EXAM PELVIC COMPLETE: CPT | Mod: 26

## 2023-02-22 PROCEDURE — 93010 ELECTROCARDIOGRAM REPORT: CPT

## 2023-02-22 PROCEDURE — 99232 SBSQ HOSP IP/OBS MODERATE 35: CPT

## 2023-02-22 RX ORDER — BENZOCAINE AND MENTHOL 5; 1 G/100ML; G/100ML
1 LIQUID ORAL ONCE
Refills: 0 | Status: COMPLETED | OUTPATIENT
Start: 2023-02-22 | End: 2023-02-22

## 2023-02-22 RX ORDER — LORATADINE 10 MG/1
10 TABLET ORAL DAILY
Refills: 0 | Status: DISCONTINUED | OUTPATIENT
Start: 2023-02-22 | End: 2023-02-25

## 2023-02-22 RX ORDER — POLYETHYLENE GLYCOL 3350 17 G/17G
17 POWDER, FOR SOLUTION ORAL DAILY
Refills: 0 | Status: DISCONTINUED | OUTPATIENT
Start: 2023-02-22 | End: 2023-02-23

## 2023-02-22 RX ORDER — POTASSIUM PHOSPHATE, MONOBASIC POTASSIUM PHOSPHATE, DIBASIC 236; 224 MG/ML; MG/ML
10 INJECTION, SOLUTION INTRAVENOUS ONCE
Refills: 0 | Status: COMPLETED | OUTPATIENT
Start: 2023-02-22 | End: 2023-02-22

## 2023-02-22 RX ORDER — DIPHENHYDRAMINE HCL 50 MG
25 CAPSULE ORAL AT BEDTIME
Refills: 0 | Status: DISCONTINUED | OUTPATIENT
Start: 2023-02-22 | End: 2023-02-25

## 2023-02-22 RX ORDER — SODIUM CHLORIDE 9 MG/ML
1000 INJECTION INTRAMUSCULAR; INTRAVENOUS; SUBCUTANEOUS ONCE
Refills: 0 | Status: COMPLETED | OUTPATIENT
Start: 2023-02-22 | End: 2023-02-22

## 2023-02-22 RX ORDER — BOSUTINIB 50 MG/1
600 CAPSULE ORAL
Qty: 0 | Refills: 0 | DISCHARGE

## 2023-02-22 RX ORDER — ACETAMINOPHEN 500 MG
650 TABLET ORAL EVERY 6 HOURS
Refills: 0 | Status: DISCONTINUED | OUTPATIENT
Start: 2023-02-22 | End: 2023-02-25

## 2023-02-22 RX ORDER — PIPERACILLIN AND TAZOBACTAM 4; .5 G/20ML; G/20ML
3000 INJECTION, POWDER, LYOPHILIZED, FOR SOLUTION INTRAVENOUS EVERY 6 HOURS
Refills: 0 | Status: DISCONTINUED | OUTPATIENT
Start: 2023-02-22 | End: 2023-02-25

## 2023-02-22 RX ORDER — DIPHENHYDRAMINE HCL 50 MG
25 CAPSULE ORAL ONCE
Refills: 0 | Status: COMPLETED | OUTPATIENT
Start: 2023-02-22 | End: 2023-02-22

## 2023-02-22 RX ADMIN — SODIUM CHLORIDE 100 MILLILITER(S): 9 INJECTION, SOLUTION INTRAVENOUS at 19:14

## 2023-02-22 RX ADMIN — Medication 25 MILLIGRAM(S): at 11:36

## 2023-02-22 RX ADMIN — Medication 650 MILLIGRAM(S): at 09:19

## 2023-02-22 RX ADMIN — POTASSIUM PHOSPHATE, MONOBASIC POTASSIUM PHOSPHATE, DIBASIC 33.33 MILLIMOLE(S): 236; 224 INJECTION, SOLUTION INTRAVENOUS at 15:02

## 2023-02-22 RX ADMIN — SODIUM CHLORIDE 2000 MILLILITER(S): 9 INJECTION INTRAMUSCULAR; INTRAVENOUS; SUBCUTANEOUS at 09:46

## 2023-02-22 RX ADMIN — Medication 25 MILLIGRAM(S): at 22:22

## 2023-02-22 RX ADMIN — BENZOCAINE AND MENTHOL 1 LOZENGE: 5; 1 LIQUID ORAL at 06:38

## 2023-02-22 RX ADMIN — LORATADINE 10 MILLIGRAM(S): 10 TABLET ORAL at 17:38

## 2023-02-22 RX ADMIN — CEFTRIAXONE 100 MILLIGRAM(S): 500 INJECTION, POWDER, FOR SOLUTION INTRAMUSCULAR; INTRAVENOUS at 10:45

## 2023-02-22 RX ADMIN — Medication 650 MILLIGRAM(S): at 10:00

## 2023-02-22 RX ADMIN — SODIUM CHLORIDE 100 MILLILITER(S): 9 INJECTION, SOLUTION INTRAVENOUS at 00:24

## 2023-02-22 RX ADMIN — POLYETHYLENE GLYCOL 3350 17 GRAM(S): 17 POWDER, FOR SOLUTION ORAL at 10:46

## 2023-02-22 RX ADMIN — SODIUM CHLORIDE 100 MILLILITER(S): 9 INJECTION, SOLUTION INTRAVENOUS at 07:15

## 2023-02-22 NOTE — DISCHARGE NOTE PROVIDER - ATTENDING DISCHARGE PHYSICAL EXAMINATION:
GENERAL: Comfortable, not in acute distress  HEAD:  Atraumatic, Normocephalic  EYES: EOMI, PERRLA, conjunctiva and sclera clear  ENMT: No tonsillar erythema or exudates, or enlargement; Moist mucous membranes, No lesions  NERVOUS SYSTEM:  Alert & Oriented X3, Good concentration; Motor Strength 5/5 B/L upper and lower extremities; DTRs 2+ intact and symmetric  CHEST/LUNG: Clear to percussion bilaterally; No rales, rhonchi, wheezing, or rubs  HEART: Regular rate and rhythm; No murmurs, rubs, or gallops  ABDOMEN: Soft, Nontender, Nondistended; Bowel sounds present  EXTREMITIES:  2+ Peripheral Pulses, No clubbing, cyanosis, or edema  SKIN: Fading erythematous plaques on bilateral pelvic side, no pus or exudates present.  NEURO: Grossly WNL, normal gait

## 2023-02-22 NOTE — CONSULT NOTE PEDS - ASSESSMENT
ASSESSMENT/PLAN:  #Edematous erythematous plaques - Given patient's recent sick contact and symptoms (cough, constipation, SOB), favor a reaction to a viral illness. However, given her concomitant CML, offered a skin biopsy with H&E, which patient and mom (Svetlana) deferred at this time. Plan to continue to monitor skin over the course of the next few days with low threshold to perform a skin biopsy  - start Claritin 10 mg PO daily and Benadryl PO qHs    Recommendations were communicated with the primary team.  The patient's chart was reviewed in addition to being examined at bedside with the dermatology attending. Discussed plans with the dermatology attending, Dr. Pisano  Dermatology will continue to follow. Thank you for consulting our service.    Samira Hess MD MPH  Resident Physician, PGY3  [facility]  Office: [phone]  Pager: [phone]  **Please page with [phone] callback # for further related questions.**

## 2023-02-22 NOTE — DISCHARGE NOTE PROVIDER - NSFOLLOWUPCLINICS_GEN_ALL_ED_FT
[facility] - [place]  Dermatology  [address], [address]  [place] [address]  Phone: [phone]  Fax: [phone]     [facility] - [place]  Dermatology  [address], [address]  [place] [address]  Phone: [phone]  Fax: [phone]    [facility] [place]  Gastroenterology & Nutrition  [address], [address]  [place] [address]  Phone: [phone]  Fax:   Follow Up Time: 1 month

## 2023-02-22 NOTE — DISCHARGE NOTE PROVIDER - HOSPITAL COURSE
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 Patient states she woke up with a fever to 102F orally on Saturday. Mom gave her Motrin and she subsequently developed a red rash on her lower abdomen and b/l hips. Mom took her to her Pediatrician that day and she was tested for COVID/flu, which was negative. The PCP told Mom to call her Oncologist if she is still febrile after 3 days. On Monday, the patient had a fever to Tmax 103F orally, and so Mom emailed her Oncologist, who never responded. Yesterday, Mom called the Oncology office and was told to bring her to the ED. In addition to fevers, patient has had chills, headaches, lightheadedness and decreased appetite. Patient notes it hurts to turn her head to the left. No congestion, rhinorrhea, ear pain, sore throat. Patient also complains of cough since yesterday and now chest pain since last night and SOB when lying flat. No palpitations. Patient's period started on Sunday and she has had a little abdominal pain with it. Patient admits to being constipated and has not had a BM since Friday. Denies N/V, diarrhea. No dysuria. +Sick contacts, Sister with cold last week. No recent travel. 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     ED Course: Febrile to 100.5F. CBC with Hb 9.8, plts 136. ANC 6.34. CMP with Na 131. UA with large blood. RVP negative. CXR negative. CTX x1. Reglan x1. NSB x2. Bcx and Ucx sent. Tachy to 110s, admitted.    Pav 3 Course (2/22 - ):  Patient arrived to the floor in stable condition. Patient continued on IV Ceftriaxone until ***. Blood culture showed ***. Urine culture showed ***.    On day of discharge, VS reviewed and remained wnl. Child continued to tolerate PO with adequate UOP. Child remained well-appearing, with no concerning findings noted on physical exam. Case and care plan d/w PMD. No additional recommendations noted. Care plan d/w caregivers who endorsed understanding. Anticipatory guidance and strict return precautions d/w caregivers in great detail. Child deemed stable for d/c home w/ recommended PMD f/u in 1-2 days of discharge.     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 Patient states she woke up with a fever to 102F orally on Saturday. Mom gave her Motrin and she subsequently developed a red rash on her lower abdomen and b/l hips. Mom took her to her Pediatrician that day and she was tested for COVID/flu, which was negative. The PCP told Mom to call her Oncologist if she is still febrile after 3 days. On Monday, the patient had a fever to Tmax 103F orally, and so Mom emailed her Oncologist, who never responded. Yesterday, Mom called the Oncology office and was told to bring her to the ED. In addition to fevers, patient has had chills, headaches, lightheadedness and decreased appetite. Patient notes it hurts to turn her head to the left. No congestion, rhinorrhea, ear pain, sore throat. Patient also complains of cough since yesterday and now chest pain since last night and SOB when lying flat. No palpitations. Patient's period started on Sunday and she has had a little abdominal pain with it. Patient admits to being constipated and has not had a BM since Friday. Denies N/V, diarrhea. No dysuria. +Sick contacts, Sister with cold last week. No recent travel. 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     ED Course: Febrile to 100.5F. CBC with Hb 9.8, plts 136. ANC 6.34. CMP with Na 131. UA with large blood. RVP negative. CXR negative. CTX x1. Reglan x1. NSB x2. Bcx and Ucx sent. Tachy to 110s, admitted.    Pav 3 Course (2/22 - ):  Patient arrived to the floor in stable condition. Patient continued on IV Ceftriaxone until 2/22, when she was switched to Zosyn. Blood culture showed no growth***. Urine culture showed < 10,000 normal urogenital arturo.  For her chest pain, Troponin <6 and CKMB <1. CMV IgG positive, indicating recent infection. HSV, EBV and varicella Abs neg. ASLO wnl. Regarding the abdominal pain, the patient was worked up with a US that was concerning for typhlitis; CT scan preliminary report suggested possible ischemic colitis, however, the final CT read was consistent with typhlitis. Pt has been NPO since 2/22 on maintenance fluid with abx coverage of Zosyn. Surgery was consulted regarding possible ischemic colitis and suggested NPO and continued monitoring with abd exams. Dermatology consulted for rash, recommended claritin and benadryl as well as start clobetasol 0.05% cream BID for up to 2 weeks on / 1 week off; repeat cycles as necessary for edematous erythematous plaques likely reactive process vs serum sickness like reaction. Skin biopsy performed to r/o possible malignancy. Please follow-up results. Patient given IV Venofer on 2/23 for Hgb of 7.9. CRP downtrending from 134 to 82 on 2/23. Patient continued on IV Zosyn until ***.    On day of discharge, VS reviewed and remained wnl. Child continued to tolerate PO with adequate UOP. Child remained well-appearing, with no concerning findings noted on physical exam. Case and care plan d/w PMD. No additional recommendations noted. Care plan d/w caregivers who endorsed understanding. Anticipatory guidance and strict return precautions d/w caregivers in great detail. Child deemed stable for d/c home w/ recommended PMD f/u in 1-2 days of discharge.     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 Patient states she woke up with a fever to 102F orally on Saturday. Mom gave her Motrin and she subsequently developed a red rash on her lower abdomen and b/l hips. Mom took her to her Pediatrician that day and she was tested for COVID/flu, which was negative. The PCP told Mom to call her Oncologist if she is still febrile after 3 days. On Monday, the patient had a fever to Tmax 103F orally, and so Mom emailed her Oncologist, who never responded. Yesterday, Mom called the Oncology office and was told to bring her to the ED. In addition to fevers, patient has had chills, headaches, lightheadedness and decreased appetite. Patient notes it hurts to turn her head to the left. No congestion, rhinorrhea, ear pain, sore throat. Patient also complains of cough since yesterday and now chest pain since last night and SOB when lying flat. No palpitations. Patient's period started on Sunday and she has had a little abdominal pain with it. Patient admits to being constipated and has not had a BM since Friday. Denies N/V, diarrhea. No dysuria. +Sick contacts, Sister with cold last week. No recent travel. 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     ED Course: Febrile to 100.5F. CBC with Hb 9.8, plts 136. ANC 6.34. CMP with Na 131. UA with large blood. RVP negative. CXR negative. CTX x1. Reglan x1. NSB x2. Bcx and Ucx sent. Tachy to 110s, admitted.    Pav 3 Course (2/22 - ):  Patient arrived to the floor in stable condition. Patient continued on IV Ceftriaxone until 2/22, when she was switched to Zosyn. Blood culture showed no growth***. Urine culture showed < 10,000 normal urogenital arturo.  For her chest pain, Troponin <6 and CKMB <1. CMV IgG positive, indicating recent infection. HSV, EBV and varicella Abs neg. ASLO wnl. Regarding the abdominal pain, the patient was worked up with a US that was concerning for typhlitis; CT scan preliminary report suggested possible ischemic colitis, however, the final CT read was consistent with typhlitis. Pt has been NPO since 2/22 on maintenance fluid with abx coverage of Zosyn. Surgery was consulted regarding possible ischemic colitis and suggested NPO and continued monitoring with abd exams. Dermatology consulted for rash, recommended claritin and benadryl as well as start clobetasol 0.05% cream BID for up to 2 weeks on / 1 week off; repeat cycles as necessary for edematous erythematous plaques likely reactive process vs serum sickness like reaction. Skin biopsy performed to r/o possible malignancy. Please follow-up results. Patient given IV Venofer on 2/23 for Hgb of 7.9. CRP downtrending from 134 -> 82 -> 64 on 2/24. Patient continued on IV Zosyn until 2/25. GI was consulted on colitis. They had low suspicion for IBD and suggested it was likely acute infectious process vs. medication (Dasatinib) related colitis. GI PCR + for EPEC. Guidance for discharge PO abx given by ID; recommended ____.     On day of discharge, VS reviewed and remained wnl. Child continued to tolerate PO with adequate UOP. Child remained well-appearing, with no concerning findings noted on physical exam. Case and care plan d/w PMD. No additional recommendations noted. Care plan d/w caregivers who endorsed understanding. Anticipatory guidance and strict return precautions d/w caregivers in great detail. Child deemed stable for d/c home w/ recommended PMD f/u in 1-2 days of discharge.     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 Patient states she woke up with a fever to 102F orally on Saturday. Mom gave her Motrin and she subsequently developed a red rash on her lower abdomen and b/l hips. Mom took her to her Pediatrician that day and she was tested for COVID/flu, which was negative. The PCP told Mom to call her Oncologist if she is still febrile after 3 days. On Monday, the patient had a fever to Tmax 103F orally, and so Mom emailed her Oncologist, who never responded. Yesterday, Mom called the Oncology office and was told to bring her to the ED. In addition to fevers, patient has had chills, headaches, lightheadedness and decreased appetite. Patient notes it hurts to turn her head to the left. No congestion, rhinorrhea, ear pain, sore throat. Patient also complains of cough since yesterday and now chest pain since last night and SOB when lying flat. No palpitations. Patient's period started on Sunday and she has had a little abdominal pain with it. Patient admits to being constipated and has not had a BM since Friday. Denies N/V, diarrhea. No dysuria. +Sick contacts, Sister with cold last week. No recent travel. 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     ED Course: Febrile to 100.5F. CBC with Hb 9.8, plts 136. ANC 6.34. CMP with Na 131. UA with large blood. RVP negative. CXR negative. CTX x1. Reglan x1. NSB x2. Bcx and Ucx sent. Tachy to 110s, admitted.    Pav 3 Course (2/22 - ):  Patient arrived to the floor in stable condition. Patient continued on IV Ceftriaxone until 2/22, when she was switched to Zosyn. Blood culture showed no growth***. Urine culture showed < 10,000 normal urogenital arturo.  For her chest pain, Troponin <6 and CKMB <1. CMV IgG positive, indicating recent infection. HSV, EBV and varicella Abs neg. ASLO wnl. Regarding the abdominal pain, the patient was worked up with a US that was concerning for typhlitis; CT scan read was consistent with typhlitis. Pt was subsequently started on abx coverage of Zosyn. ID and GI consulted regarding diagnosis of typhlitis without neutropenia, GI recommended stool studies for eval of other etiologies of colitis (GI PCR and Fecal Calprotectin, medication induced), GI PCR was + EPEC. Based on discussion with ID, low liklihood that needs further abx coverage but due to her CML history, will treat with 5d course of cipro. Dermatology consulted for rash, recommended claritin and benadryl as well as start clobetasol 0.05% cream BID for up to 2 weeks on / 1 week off; repeat cycles as necessary for edematous erythematous plaques likely reactive process vs serum sickness like reaction. Skin biopsy performed to r/o possible malignancy. Please follow-up results. Patient given IV Venofer on 2/23 for Hgb of 7.9. CRP downtrending from 134 -> 82 -> 64 on 2/24. Patient continued on IV Zosyn until 2/25 at discharge then to take 5d course of ciprofloxacin outpatient.     On day of discharge, VS reviewed and remained wnl. Child continued to tolerate PO with adequate UOP. Child remained well-appearing, with no concerning findings noted on physical exam. Case and care plan d/w PMD. No additional recommendations noted. Care plan d/w caregivers who endorsed understanding. Anticipatory guidance and strict return precautions d/w caregivers in great detail. Child deemed stable for d/c home w/ recommended PMD f/u in 1-2 days of discharge.     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 Patient states she woke up with a fever to 102F orally on Saturday. Mom gave her Motrin and she subsequently developed a red rash on her lower abdomen and b/l hips. Mom took her to her Pediatrician that day and she was tested for COVID/flu, which was negative. The PCP told Mom to call her Oncologist if she is still febrile after 3 days. On Monday, the patient had a fever to Tmax 103F orally, and so Mom emailed her Oncologist, who never responded. Yesterday, Mom called the Oncology office and was told to bring her to the ED. In addition to fevers, patient has had chills, headaches, lightheadedness and decreased appetite. Patient notes it hurts to turn her head to the left. No congestion, rhinorrhea, ear pain, sore throat. Patient also complains of cough since yesterday and now chest pain since last night and SOB when lying flat. No palpitations. Patient's period started on Sunday and she has had a little abdominal pain with it. Patient admits to being constipated and has not had a BM since Friday. Denies N/V, diarrhea. No dysuria. +Sick contacts, Sister with cold last week. No recent travel. 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     ED Course: Febrile to 100.5F. CBC with Hb 9.8, plts 136. ANC 6.34. CMP with Na 131. UA with large blood. RVP negative. CXR negative. CTX x1. Reglan x1. NSB x2. Bcx and Ucx sent. Tachy to 110s, admitted.    Pav 3 Course (2/22 - 2/25):  Patient arrived to the floor in stable condition. Patient continued on IV Ceftriaxone until 2/22, when she was switched to Zosyn. Blood culture showed no growth. Urine culture showed < 10,000 normal urogenital arturo.  For her chest pain, Troponin <6 and CKMB <1. CMV IgG positive, indicating recent infection. HSV, EBV and varicella Abs neg. ASLO wnl. Regarding the abdominal pain, the patient was worked up with a US that was concerning for typhlitis; CT scan read was consistent with typhlitis. Pt was subsequently started on abx coverage of Zosyn. ID and GI consulted regarding diagnosis of typhlitis without neutropenia, GI recommended stool studies for eval of other etiologies of colitis (GI PCR and Fecal Calprotectin, medication induced), GI PCR was + EPEC. Based on discussion with ID, low liklihood that needs further abx coverage but due to her CML history, will treat with 5d course of cipro. Dermatology consulted for rash, recommended claritin and benadryl as well as start clobetasol 0.05% cream BID for up to 2 weeks on / 1 week off; repeat cycles as necessary for edematous erythematous plaques likely reactive process vs serum sickness like reaction. Skin biopsy performed to r/o possible malignancy. Please follow-up results. Patient given IV Venofer on 2/23 for Hgb of 7.9. CRP downtrending from 134 -> 82 -> 64 on 2/24. Patient continued on IV Zosyn until 2/25 at discharge then to take 5d course of ciprofloxacin outpatient. Patient recommended to have repeat abdominal US.     On day of discharge, VS reviewed and remained wnl. Child continued to tolerate PO with adequate UOP. Child remained well-appearing, with no concerning findings noted on physical exam. Case and care plan d/w PMD. No additional recommendations noted. Care plan d/w caregivers who endorsed understanding. Anticipatory guidance and strict return precautions d/w caregivers in great detail. Child deemed stable for d/c home w/ recommended PMD f/u in 1-2 days of discharge.     Discharge Vitals:  ICU Vital Signs Last 24 Hrs  T(C): 36.8 (25 Feb 2023 05:52), Max: 36.8 (24 Feb 2023 21:56)  T(F): 98.2 (25 Feb 2023 05:52), Max: 98.2 (24 Feb 2023 21:56)  HR: 71 (25 Feb 2023 05:52) (71 - 98)  BP: 121/81 (25 Feb 2023 05:52) (107/72 - 125/90)  BP(mean): --  ABP: --  ABP(mean): --  RR: 18 (25 Feb 2023 05:52) (18 - 22)  SpO2: 97% (25 Feb 2023 05:52) (96% - 100%)    O2 Parameters below as of 25 Feb 2023 05:52  Patient On (Oxygen Delivery Method): room air    Discharge Physical Exam:  General: sitting upright, well appearing, no apparent distress  HENT: moist mucous membranes, no mouth sores or mucosal bleeding, no conjunctival injection, neck supple, no masses  Cardio: tachycardic, regular rhythm, normal S1, S2, no murmurs, rubs or gallops, cap refill < 2 seconds  Respiratory: small dry cough, decreased airflow in chaz lower lobes, no increased work of breathing  Abdomen: soft, nondistended, normoactive bowel sounds, no hepatosplenomegaly, no masses,  Lymphadenopathy: no adenopathy appreciated  Skin: fading edematous erythematous plaques scattered throughout lower trunk and pelvic girdle.   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 Patient states she woke up with a fever to 102F orally on Saturday. Mom gave her Motrin and she subsequently developed a red rash on her lower abdomen and b/l hips. Mom took her to her Pediatrician that day and she was tested for COVID/flu, which was negative. The PCP told Mom to call her Oncologist if she is still febrile after 3 days. On Monday, the patient had a fever to Tmax 103F orally, and so Mom emailed her Oncologist, who never responded. Yesterday, Mom called the Oncology office and was told to bring her to the ED. In addition to fevers, patient has had chills, headaches, lightheadedness and decreased appetite. Patient notes it hurts to turn her head to the left. No congestion, rhinorrhea, ear pain, sore throat. Patient also complains of cough since yesterday and now chest pain since last night and SOB when lying flat. No palpitations. Patient's period started on Sunday and she has had a little abdominal pain with it. Patient admits to being constipated and has not had a BM since Friday. Denies N/V, diarrhea. No dysuria. +Sick contacts, Sister with cold last week. No recent travel. 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     ED Course: Febrile to 100.5F. CBC with Hb 9.8, plts 136. ANC 6.34. CMP with Na 131. UA with large blood. RVP negative. CXR negative. CTX x1. Reglan x1. NSB x2. Bcx and Ucx sent. Tachy to 110s, admitted.    Pav 3 Course (2/22 - 2/25):  Patient arrived to the floor in stable condition. Patient continued on IV Ceftriaxone until 2/22, when she was switched to Zosyn. Blood culture showed no growth. Urine culture showed < 10,000 normal urogenital arturo.  For her chest pain, Troponin <6 and CKMB <1. CMV IgG positive, indicating recent infection. HSV, EBV and varicella Abs neg. ASLO wnl. Regarding the abdominal pain, the patient was worked up with a US that was concerning for typhlitis; CT scan read was consistent with typhlitis. Pt was subsequently started on abx coverage of Zosyn. ID and GI consulted regarding diagnosis of typhlitis without neutropenia, GI recommended stool studies for eval of other etiologies of colitis (GI PCR and Fecal Calprotectin, medication induced), GI PCR was + EPEC. Based on discussion with ID, low liklihood that needs further abx coverage but due to her CML history, will treat with 5d course of cipro. Dermatology consulted for rash, recommended claritin and benadryl as well as start clobetasol 0.05% cream BID for up to 2 weeks on / 1 week off; repeat cycles as necessary for edematous erythematous plaques likely reactive process vs serum sickness like reaction. Skin biopsy performed to r/o possible malignancy. Please follow-up results. Patient given IV Venofer on 2/23 for Hgb of 7.9. CRP downtrending from 134 -> 82 -> 64 on 2/24. Patient continued on IV Zosyn until 2/25 at discharge then to take 5d course of ciprofloxacin outpatient. Patient recommended to have repeat abdominal US.     On day of discharge, VS reviewed and remained wnl. Child continued to tolerate PO with adequate UOP. Child remained well-appearing, with no concerning findings noted on physical exam. Case and care plan d/w PMD. No additional recommendations noted. Care plan d/w caregivers who endorsed understanding. Anticipatory guidance and strict return precautions d/w caregivers in great detail. Child deemed stable for d/c home w/ recommended PMD f/u in 1-2 days of discharge.     Discharge Vitals:  ICU Vital Signs Last 24 Hrs  T(C): 36.8 (25 Feb 2023 05:52), Max: 36.8 (24 Feb 2023 21:56)  T(F): 98.2 (25 Feb 2023 05:52), Max: 98.2 (24 Feb 2023 21:56)  HR: 71 (25 Feb 2023 05:52) (71 - 98)  BP: 121/81 (25 Feb 2023 05:52) (107/72 - 125/90)  BP(mean): --  ABP: --  ABP(mean): --  RR: 18 (25 Feb 2023 05:52) (18 - 22)  SpO2: 97% (25 Feb 2023 05:52) (96% - 100%)    O2 Parameters below as of 25 Feb 2023 05:52  Patient On (Oxygen Delivery Method): room air    Discharge Physical Exam:  General: sitting upright, well appearing, no apparent distress  HENT: moist mucous membranes, no mouth sores or mucosal bleeding, no conjunctival injection, neck supple, no masses  Cardio: tachycardic, regular rhythm, normal S1, S2, no murmurs, rubs or gallops, cap refill < 2 seconds  Respiratory: small dry cough, decreased airflow in chaz lower lobes, no increased work of breathing  Abdomen: soft, nondistended, normoactive bowel sounds, no hepatosplenomegaly, no masses,  Lymphadenopathy: no adenopathy appreciated  Skin: fading edematous erythematous plaques scattered throughout lower trunk and pelvic girdle.   Neuro: no focal neurological deficits noted

## 2023-02-22 NOTE — DISCHARGE NOTE PROVIDER - NSDCFUSCHEDAPPT_GEN_ALL_CORE_FT
Eric Zavaleta  [facility]  [facility] [phone]   Scheduled Appointment: 03/15/2023

## 2023-02-22 NOTE — H&P PEDIATRIC - NSHPLABSRESULTS_GEN_ALL_CORE
.  LABS:                         9.8    8.75  )-----------( 136      ( 2023 10:30 )             31.5         131<L>  |  96<L>  |  9   ----------------------------<  115<H>  3.6   |  22  |  0.66    Ca    8.8      2023 10:30    TPro  7.5  /  Alb  4.2  /  TBili  0.8  /  DBili  x   /  AST  24  /  ALT  21  /  AlkPhos  65        HCG Quantitative, Serum: <5.0      Urinalysis Basic - ( 2023 12:45 )    Color: Yellow / Appearance: Clear / S.021 / pH: x  Gluc: x / Ketone: Moderate  / Bili: Negative / Urobili: <2 mg/dL   Blood: x / Protein: 300 mg/dL / Nitrite: Negative   Leuk Esterase: Negative / RBC: >50 /HPF / WBC 0-2 /HPF   Sq Epi: x / Non Sq Epi: Few / Bacteria: Occasional      Rapid RVP Result: NotDetec (23 @ 10:45)       < from: Xray Chest 2 Views PA/Lat (23 @ 23:28) >    IMPRESSION: Clear lungs.    < end of copied text >      RADIOLOGY, EKG & ADDITIONAL TESTS: Reviewed.

## 2023-02-22 NOTE — H&P PEDIATRIC - NSHPPHYSICALEXAM_GEN_ALL_CORE
.  Vital Signs Last 24 Hrs  T(C): 37.5 (22 Feb 2023 00:15), Max: 38.1 (21 Feb 2023 10:17)  T(F): 99.5 (22 Feb 2023 00:15), Max: 100.5 (21 Feb 2023 10:17)  HR: 109 (22 Feb 2023 00:15) (105 - 125)  BP: 107/69 (22 Feb 2023 00:15) (100/54 - 116/75)  BP(mean): 72 (21 Feb 2023 22:12) (64 - 84)  RR: 19 (22 Feb 2023 00:15) (18 - 24)  SpO2: 100% (22 Feb 2023 00:15) (99% - 100%)    Parameters below as of 22 Feb 2023 00:15  Patient On (Oxygen Delivery Method): room air      Gen: NAD, well appearing  HEENT: NC/AT, PERRLA, EOMI, MMM, Throat clear, no LAD   Heart: RRR, S1S2+, no murmur  Lungs: normal effort, CTAB, no wheezing, rales, rhonchi  Abd: soft, NT, ND, BSP, no HSM  Ext: atraumatic, FROM, WWP  Neuro: no focal deficits  Skin: no rashes .  Vital Signs Last 24 Hrs  T(C): 37.5 (22 Feb 2023 00:15), Max: 38.1 (21 Feb 2023 10:17)  T(F): 99.5 (22 Feb 2023 00:15), Max: 100.5 (21 Feb 2023 10:17)  HR: 109 (22 Feb 2023 00:15) (105 - 125)  BP: 107/69 (22 Feb 2023 00:15) (100/54 - 116/75)  BP(mean): 72 (21 Feb 2023 22:12) (64 - 84)  RR: 19 (22 Feb 2023 00:15) (18 - 24)  SpO2: 100% (22 Feb 2023 00:15) (99% - 100%)    Parameters below as of 22 Feb 2023 00:15  Patient On (Oxygen Delivery Method): room air      Gen: NAD, tired appearing, interactive  HEENT: NC/AT, PERRLA, EOMI, MMM, Throat clear, no LAD   Heart: RRR, S1S2+, no murmur  Lungs: normal effort, CTAB, no wheezing, rales, rhonchi  Abd: soft, NT, ND, BSP, no HSM  Ext: atraumatic, FROM, WWP, cap refill < 2sec  Neuro: AAOx3, no focal deficits, negative Kernigs and Brudzinsky  Skin: +Erythematous balanching rash on neck and b/l hips .  Vital Signs Last 24 Hrs  T(C): 37.5 (22 Feb 2023 00:15), Max: 38.1 (21 Feb 2023 10:17)  T(F): 99.5 (22 Feb 2023 00:15), Max: 100.5 (21 Feb 2023 10:17)  HR: 109 (22 Feb 2023 00:15) (105 - 125)  BP: 107/69 (22 Feb 2023 00:15) (100/54 - 116/75)  BP(mean): 72 (21 Feb 2023 22:12) (64 - 84)  RR: 19 (22 Feb 2023 00:15) (18 - 24)  SpO2: 100% (22 Feb 2023 00:15) (99% - 100%)    Parameters below as of 22 Feb 2023 00:15  Patient On (Oxygen Delivery Method): room air      Gen: NAD, tired appearing, interactive  HEENT: NC/AT, PERRLA, EOMI, MMM, Throat clear, no LAD   Heart: RRR, S1S2+, no murmur, rubs, gallops   Lungs: normal effort, CTAB, no wheezing, rales, rhonchi  Abd: soft, NT, ND, BSP, no HSM  Ext: atraumatic, FROM, WWP, cap refill < 2sec  Neuro: AAOx3, no focal deficits, negative Kernigs and Brudzinsky  Skin: No edema, +Erythematous balanching rash on neck and b/l hips

## 2023-02-22 NOTE — PATIENT PROFILE PEDIATRIC - WILL THE PATIENT ACCEPT THE PFIZER COVID-19 VACCINE IF ELIGIBLE AND IT IS AVAILABLE?
----- Message from Noé Avila MD sent at 3/9/2018  8:58 AM CST -----  Please call the patient with results.  A1c 6.4 still at goal.  Thyroid Function satisfactory.   Abnormal lipid panel with a worsening total cholesterol, triglycerides-no changes in medication for now. Recommend increasing activity, weight loss to prevent ongoing titration of medications     Writer informed patient of Dr Mcintosh message/result. Patient states verbal understanding and denies further questions at this time.      none No

## 2023-02-22 NOTE — DISCHARGE NOTE PROVIDER - CARE PROVIDER_API CALL
Eric Zavaleta; MBBS)  Pediatrics  [phone] [place] [address], [place] [address]  Phone: [phone]  Fax: [phone]  Follow Up Time: 1 week

## 2023-02-22 NOTE — H&P PEDIATRIC - HISTORY OF PRESENT ILLNESS
17 year old F with chronic phase CML who is now being treated with Dasatinib here with fevers x4 days. Seen at pediatrician, COVID/flu negative. Also with poor appetite and cough. Rash at home with advil. 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     ED Course: Febrile to 100.5F. CBC with Hb 9.8, plts 136. CMP with Na 131. UA with large blood. RVP negative. CXR negative. CTX x1. Reglan x1. NSB x2. Bcx and Ucx sent. Tachy to 110s, admitted.   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 Patient states she woke up with a fever to 102F orally on Saturday. Mom gave her Motrin and she subsequently developed a red rash on her lower abdomen and b/l hips. Mom took her to her Pediatrician that day and she was tested for COVID/flu, which was negative. The PCP told Mom to call her Oncologist if she is still febrile after 3 days. On Monday, the patient had a fever to Tmax 103F orally, and so Mom emailed her Oncologist, who never responded. Yesterday, Mom called the Oncology office and was told to bring her to the ED. In addition to fevers, patient has had chills, headaches, lightheadedness and decreased appetite. Patient notes it hurts to turn her head to the left. No congestion, rhinorrhea, ear pain, sore throat. Patient also complains of cough since yesterday and now chest pain since last night and SOB when lying flat. No palpitations. Patient's period started on Sunday and she has had a little abdominal pain with it. Patient admits to being constipated and has not had a BM since Friday. Denies N/V, diarrhea. No dysuria.    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     ED Course: Febrile to 100.5F. CBC with Hb 9.8, plts 136. CMP with Na 131. UA with large blood. RVP negative. CXR negative. CTX x1. Reglan x1. NSB x2. Bcx and Ucx sent. Tachy to 110s, admitted.   18yo F with chronic phase CML on Dasatinib here with fevers x4 days. Patient states she woke up with a fever to 102F orally on Saturday. Mom gave her Motrin and she subsequently developed a red rash on her lower abdomen and b/l hips. Mom took her to her Pediatrician that day and she was tested for COVID/flu, which was negative. The PCP told Mom to call her Oncologist if she is still febrile after 3 days. On Monday, the patient had a fever to Tmax 103F orally, and so Mom emailed her Oncologist, who never responded. Yesterday, Mom called the Oncology office and was told to bring her to the ED. In addition to fevers, patient has had chills, headaches, lightheadedness and decreased appetite. Patient notes it hurts to turn her head to the left. No congestion, rhinorrhea, ear pain, sore throat. Patient also complains of cough since yesterday and now chest pain since last night and SOB when lying flat. No palpitations. Patient's period started on Sunday and she has had a little abdominal pain with it. Patient admits to being constipated and has not had a BM since Friday. Denies N/V, diarrhea. No dysuria. +Sick contacts, Sister with cold last week. No recent travel. 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     ED Course: Febrile to 100.5F. CBC with Hb 9.8, plts 136. ANC 6.34. CMP with Na 131. UA with large blood. RVP negative. CXR negative. CTX x1. Reglan x1. NSB x2. Bcx and Ucx sent. Tachy to 110s, admitted.   18yo F with chronic phase CML on Dasatinib here with fevers x4 days. Patient states she woke up with a fever to 102F orally on Saturday. Mom gave her Motrin and she subsequently developed a red rash on her lower abdomen and b/l hips. Mom took her to her Pediatrician that day and she was tested for COVID/flu, which was negative. The PCP told Mom to call her Oncologist if she is still febrile after 3 days. On Monday, the patient had a fever to Tmax 103F orally. Yesterday, Mom called the Oncology office and was told to bring her to the ED. In addition to fevers, patient has had chills, headaches, lightheadedness and decreased appetite. Patient notes it hurts to turn her head to the left. No congestion, rhinorrhea, ear pain, sore throat. Patient also complains of cough since yesterday and now chest pain since last night and SOB when lying flat. No palpitations. Patient's period started on Sunday and she has had a little abdominal pain with it. Patient admits to being constipated and has not had a BM since Friday. Denies N/V, diarrhea. No dysuria. +Sick contacts, Sister with cold last week. No recent travel. 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     ED Course: Febrile to 100.5F. CBC with Hb 9.8, plts 136. ANC 6.34. CMP with Na 131. UA with large blood. RVP negative. CXR negative. CTX x1. Reglan x1. NSB x2. Bcx and Ucx sent. Tachy to 110s, admitted.

## 2023-02-22 NOTE — CONSULT NOTE PEDS - SUBJECTIVE AND OBJECTIVE BOX
HPI: 16yo F with chronic phase CML on Dasatinib here with fevers x4 days (initially [id number] on Saturday. Mom gave her Motrin and subsequently developed a red rash a/w burning sensation on her lower abdomen and b/l hips on . Mom took her to her Pediatrician that day and she was tested for COVID/flu, which was negative. Per patient’s PCP and oncology, pt was admitted for PUO. In addition to fevers, patient has had chills, headaches, lightheadedness and decreased appetite. Patient also complains of cough x 2d a/w chest pain and SOB when lying flat. Patient's period started on  and she has had slight abdominal pain with it. Patient admits to being constipated and has not had a BM since Friday. Denies N/V, diarrhea. No congestion, rhinorrhea, ear pain, sore throat, dysuria. +Sick contacts, Sister with cold last week. No recent travel. 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 No new medications. Only uses Advil PRN at home.    Per mom, redness is improving since one dose of -PO Benadryl today. No topical treatments have been tried.      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  Cardiovascular: no palpitations  Gastrointestinal: +Constipation, abdominal pain. No blood in stool  Genitourinary: no dysuria or frequency  Musculoskeletal: no joint pains or weakness  Neurological: +Headache. No weakness, numbness, or tingling    MEDICATIONS  (STANDING):  cefTRIAXone IV Intermittent - Peds 2000 milliGRAM(s) IV Intermittent every 24 hours  dextrose 5% + sodium chloride 0.9%. - Pediatric 1000 milliLiter(s) (100 mL/Hr) IV Continuous <Continuous>  polyethylene glycol 3350 Oral Powder - Peds 17 Gram(s) Oral daily    MEDICATIONS  (PRN):  acetaminophen   Oral Tab/Cap - Peds. 650 milliGRAM(s) Oral every 6 hours PRN Moderate Pain (4 - 6)      Allergies    No Known Allergies    Intolerances        SOCIAL HISTORY: No pertinent social history.    FAMILY HISTORY: No pertinent family history in first degree relatives.    Vital Signs Last 24 Hrs  T(C): 36.9 (2023 14:19), Max: 38.2 (2023 09:32)  T(F): 98.4 (2023 14:19), Max: 100.7 (2023 09:32)  HR: 99 (2023 14:19) (99 - 120)  BP: 116/83 (2023 14:19) (100/62 - 116/83)  BP(mean): 72 (2023 22:12) (71 - 72)  RR: 18 (2023 14:19) (16 - 20)  SpO2: 100% (2023 14:19) (97% - 100%)    Parameters below as of 2023 14:19  Patient On (Oxygen Delivery Method): room air      PHYSICAL EXAM:  The patient was AOx3, well nourished, and in NAD.  OP showed no ulcerations.  There was no visible LAD.  Conjunctiva were non-injected.  There was no clubbing or edema of extremities.  The scalp, hair, face, eyebrows, lips, OP, neck, chest, back, extremities x4, and nails were examined.  There was no hyperhidrosis or bromhidrosis.    Of note on skin exam:  - edematous erythematous plaques on chest, trunk and upper thighs  - dermatographic     LABS:                        8.0    6.29  )-----------( 102      ( 2023 11:02 )             26.3         136  |  106  |  6<L>  ----------------------------<  102<H>  3.5   |  20<L>  |  0.46<L>    Ca    7.9<L>      2023 11:02  Phos  1.7     02  Mg     1.90         TPro  6.4  /  Alb  3.4  /  TBili  0.4  /  DBili  x   /  AST  13  /  ALT  14  /  AlkPhos  61        Urinalysis Basic - ( 2023 12:45 )    Color: Yellow / Appearance: Clear / S.021 / pH: x  Gluc: x / Ketone: Moderate  / Bili: Negative / Urobili: <2 mg/dL   Blood: x / Protein: 300 mg/dL / Nitrite: Negative   Leuk Esterase: Negative / RBC: >50 /HPF / WBC 0-2 /HPF   Sq Epi: x / Non Sq Epi: Few / Bacteria: Occasional        RADIOLOGY & ADDITIONAL STUDIES:  CXR:   ACC: [id number] EXAM:  XR CHEST PA LAT 2V   ORDERED BY: ZULEYMA REAL     PROCEDURE DATE:  2023      IMPRESSION:  Clear lungs.

## 2023-02-22 NOTE — H&P PEDIATRIC - NSHPREVIEWOFSYSTEMS_GEN_ALL_CORE
.  Gen: +Fever, decreased appetite  Eyes: No eye irritation or discharge  ENT: No ear pain, congestion, sore throat  Resp: +Cough. No trouble breathing  Cardiovascular: No chest pain or palpitation  Gastroenteric: No nausea/vomiting, diarrhea, constipation  : No change in urine output; no dysuria  MS: No joint or muscle pain  Skin: No rashes  Neuro: No headache; no abnormal movements  Remainder negative, except as per the HPI .  Gen: +Fever, chills, decreased appetite  Eyes: No eye irritation or discharge  ENT: No ear pain, congestion, sore throat  Resp: +Cough. No trouble breathing  Cardiovascular: +Chest pain. No palpitation  Gastroenteric: +Constipation, abdominal pain. No nausea/vomiting, diarrhea  : +Menstruating. No change in urine output; no dysuria  MS: No joint or muscle pain  Skin: +Rashes  Neuro: +Headache. No abnormal movements  Remainder negative, except as per the HPI .  Gen: +Fever, chills, lightheaded, decreased appetite  Eyes: No eye irritation or discharge  ENT: No ear pain, congestion, sore throat  Resp: +Cough. No trouble breathing  Cardiovascular: +Chest pain, orthopnea. No palpitations  Gastroenteric: +Constipation, abdominal pain. No nausea/vomiting, diarrhea  : +Menstruating. No change in urine output; no dysuria  MS: No joint or muscle pain  Skin: +Rashes  Neuro: +Headache. No abnormal movements  Remainder negative, except as per the HPI Gen: +Fever, chills, lightheaded, decreased appetite  Eyes: No eye irritation or discharge  ENT: No ear pain, congestion, sore throat  Resp: +Cough. No trouble breathing  Cardiovascular: +Chest pain, orthopnea. No palpitations  Gastroenteric: +Constipation, abdominal pain. No nausea/vomiting, diarrhea  : +Menstruating. No change in urine output; no dysuria  MS: No joint or muscle pain  Skin: +Rashes  Neuro: +Headache. No abnormal movements  Remainder negative, except as per the HPI

## 2023-02-22 NOTE — DISCHARGE NOTE PROVIDER - NSDCCPCAREPLAN_GEN_ALL_CORE_FT
PRINCIPAL DISCHARGE DIAGNOSIS  Diagnosis: Fever  Assessment and Plan of Treatment: You entered the hospital with 4 days of fever, 3 days of rash, and unspecified abdominal pain. In the ED, you were found to have a high HR. You were started on an antibiotic (Ceftriaxone) and multiple tests to determine the source of the fever inculding a viral panel, chest x-ray, and blood/urine cultures were all negative. On the floor, the rash continued to be present and painful. Dermatology recommended benadryl, Claritin, and clobetasol cream; the rash improved by 2/24. A skin biopsy was performed and results are pending. Regarding the abdominal pain, imaging demonstrated colitis (inflamation of the colon). You were put on IV fluids and were slowly allowed to resume eating. The GI doctors recommended a GI PCR - you were found to have a bacteria (EPEC) that will resolve by itself. During the hospital stay, the Dasatinib was held to determine if symptoms improved. Dasatinib was not resumed and will require a visit to Heme/Onc next week. During the stay you were found to have a Hgb of 7.9 and were given IV Iron.   Discharge Instructions:   Return to the ED  -  if fever returns within the week   - Severe Abdominal pain returns   - If nausea/abdonimal pain limits the ability to eat or drink   - Diarrhea (>8 per day)   - If the rash expands  Do NOT resume Dasatinib until follow up with Heme/Onc as an outpatient   For the rash: Claritin 10 mg daily and Benadryl 25mg PO, clobetasol 0.05% cream BID for up to 2 weeks on / 1 week off. Repeat cycles as necessary.   Follow up appointments as scheduled with:  - heme/onc service for Wednesday   - Dermatology clinic          PRINCIPAL DISCHARGE DIAGNOSIS  Diagnosis: Fever  Assessment and Plan of Treatment: You entered the hospital with 4 days of fever, 3 days of rash, and unspecified abdominal pain. In the ED, you were found to have a high HR. You were started on an antibiotic (Ceftriaxone) and multiple tests to determine the source of the fever inculding a viral panel, chest x-ray, and blood/urine cultures were all negative. On the floor, the rash continued to be present and painful. Dermatology recommended benadryl, Claritin, and clobetasol cream; the rash improved by 2/24. A skin biopsy was performed and results are pending. Regarding the abdominal pain, imaging demonstrated colitis (inflamation of the colon). You were put on IV fluids and were slowly allowed to resume eating. The GI doctors recommended a GI PCR - you were found to have a bacteria (EPEC) that will resolve by itself. During the hospital stay, the Dasatinib was held to determine if symptoms improved. Dasatinib was not resumed and will require a visit to Heme/Onc next week. During the stay you were found to have a Hgb of 7.9 and were given IV Iron.   Discharge Instructions:   - Please continue steriod cream to rash daily for 2 weeks on, 1 week off, continue as necessary  - Continue daily claritin and nightly benadryl for rash  - Please take antibiotic (ciprofloxacin) 1 tab twice daily for 5 days after discharge.   - Dermatology will call you with the results of the biopsy and follow up plans  - PLEASE ensure follow up with Dr. Zavaleta on 3/1 - please call to schedule the appointment  - Do NOT resume Dasatinib until follow up with Heme/Onc as an outpatient   Return to the ED  -  if fever returns within the week   - Severe Abdominal pain returns   - If nausea/abdonimal pain limits the ability to eat or drink   - Diarrhea (>8 per day)   - If the rash expands

## 2023-02-22 NOTE — H&P PEDIATRIC - NSHPSOCIALHISTORY_GEN_ALL_CORE
H: Lives at home with Mom, Dad and sister. +Sick contacts, sister with cold last week  E: Freshman in college at [employer], has friends at school  A: Denies alcohol use  D: Denies drug use  S: Denies sexually active  S: Denies SI/HI. H: Lives at home with Mom, Dad and sister. Feels safe at home. +Sick contacts, sister with cold last week; no recent travel  E: Freshman in college at [employer], has friends at school, no bullying  A: Denies alcohol use  D: Denies drug use  S: Denies sexually activity  S: Denies SI/HI.

## 2023-02-22 NOTE — H&P PEDIATRIC - ASSESSMENT
17 year old F with chronic phase CML who is now being treated with Dasatinib here with fevers x4 days. Seen at pediatrician, COVID/flu negative. Also with poor appetite and cough. Rash at home with advil. 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     Fever  - CTX qd (2/21 - )  - Tylenol PRN    FENGI  - mIVF  - Regular diet 17 year old F with chronic phase CML who is now being treated with Dasatinib here with fevers x4 days. Seen at pediatrician, COVID/flu negative. Also with poor appetite and cough. Rash at home with advil. 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     #Resp:  - RA    #CV: tachycardia  - EKG  - tele    #ID: Fever r/o SBI  - IV CTX qd (2/21 - )  - RVP neg  - CXR clear  - f/u BCx  - f/u UCx  - Tylenol PRN    #: Menstruating  - UA +large blood    #Derm: Rash  - Monitor    #FENGI: Constipation  - mIVF  - Regular diet 16yo F with chronic phase CML on Dasatinib here with fevers x4 days c/f SBI. Plan to continue IV Ceftriaxone and follow-up BCx and UCx. Patient on continuous telemetry for monitoring of tachycardia. Will continue mIVF and anti-nausea medicines as needed.  Myocarditis likely 2/2 tachycardia, chest pain and orthopnea. Strep throat less likely 2/2 throat clear and presence of cough. Meningitis less likely 2/2 FROM of neck, negative Kernigs and Brudzinsky. UTI less likely 2/2 no dysuria and UA showed no LE or nitrites and 0-2 WBCs. Pneumonia unlikely 2/2 CTAB and CXR showed no focal consolidations.    #Resp:  - RA    #CV: Tachycardia  - f/u EKG  - tele    #ID: Fever r/o SBI  - IV CTX qd (2/21 - )  - RVP neg  - CXR clear  - f/u BCx  - f/u UCx  - Tylenol PRN    #: Menstruating  - UA +large blood    #Derm: Rash  - Monitor    #FENGI: Constipation  - mIVF  - Regular diet  - Is&Os 16yo F with chronic phase CML on Dasatinib here with fevers x4 days c/f SBI. Plan to continue IV Ceftriaxone and follow-up BCx and UCx. Patient on continuous telemetry for monitoring of tachycardia. Will continue mIVF and anti-nausea medicines as needed.  Myocarditis likely 2/2 tachycardia, chest pain and orthopnea. Dasatinib induced HF less likely. Viral URI less likely 2/2 RVP negative. Strep throat less likely 2/2 throat clear and presence of cough. Meningitis less likely 2/2 FROM of neck, negative Kernigs and Brudzinsky. UTI less likely 2/2 no dysuria and UA without LE or nitrites, 0-2 WBCs. Pneumonia unlikely 2/2 CTAB and CXR showed no focal consolidations.    #Resp:  - RA    #CV: Tachycardia  - f/u EKG  - tele    #ID: Fever r/o SBI  - IV CTX qd (2/21 - )  - RVP neg  - CXR clear  - f/u BCx  - f/u UCx  - Tylenol PRN    #Heme:  - Hgb stable (9.8)    #Onc: CML  - Dasatinib     #: Menstruating  - UA +large blood    #Derm: Rash  - Monitor    #FENGI: Constipation  - mIVF  - Regular diet  - Is&Os 18yo F with chronic phase CML on Dasatinib here with fevers x4 days c/f SBI. Plan to continue IV Ceftriaxone and follow-up BCx and UCx. Patient on continuous telemetry for monitoring of tachycardia. Will continue mIVF and anti-nausea medicines as needed.  Myocarditis likely 2/2 tachycardia, chest pain and orthopnea. Bacteremia likely 2/2 immunocompromised. Dasatinib-induced HF vs pHTN vs pulmonary dz less likely given fevers. Viral URI less likely 2/2 RVP negative. Strep throat less likely 2/2 throat clear and presence of cough. Meningitis less likely 2/2 FROM of neck, negative Kernigs and Brudzinsky. UTI less likely 2/2 no dysuria and UA without LE or nitrites, 0-2 WBCs. Pneumonia unlikely 2/2 CTAB and CXR showed no focal consolidations.     #Resp:  - RA    #CV: Tachycardia  - f/u EKG  - tele    #ID: Fever r/o SBI  - IV CTX qd (2/21 - )  - RVP neg  - CXR clear  - f/u BCx  - f/u UCx  - Tylenol PRN    #Heme:  - Hgb stable (9.8)    #Onc: CML  - Dasatinib     #: Menstruating  - UA +large blood    #Derm: Rash  - Monitor    #FENGI: Constipation  - mIVF  - Regular diet  - Is&Os 18yo F with chronic phase CML on Dasatinib here with fevers x4 days c/f SBI. Plan to continue IV Ceftriaxone and follow-up BCx and UCx. Patient on continuous telemetry for monitoring of tachycardia. Will continue mIVF and anti-nausea medicines as needed.  Bacteremia likely 2/2 immunocompromised and fevers. Myocarditis likely 2/2 tachycardia, chest pain and orthopnea. Dasatinib-induced HF vs pHTN vs pulmonary dz less likely given fevers. Viral URI less likely 2/2 RVP negative. Strep throat less likely 2/2 throat clear and presence of cough. Meningitis less likely 2/2 FROM of neck, negative Kernigs and Brudzinsky. UTI less likely 2/2 no dysuria and UA without LE or nitrites, 0-2 WBCs. Appendicitis unlikely 2/2 benign abdominal exam. Pancreatitis unlikely 2/2 no N/V. Gastroenteritis unlikely 2/2 no N/V or diarrhea. Pneumonia unlikely 2/2 CTAB and CXR showed no focal consolidations.    #Resp:  - RA    #CV: Tachycardia  - f/u EKG  - tele    #ID: Fever r/o SBI  - IV CTX qd (2/21 - )  - RVP neg  - CXR clear  - f/u BCx  - f/u UCx  - Tylenol PRN    #Heme:  - Hgb stable (9.8)    #Onc: CML  - Hold: Dasatinib     #: Menstruating  - UA +large blood    #Derm: Rash  - Monitor    #FENGI: Constipation  - mIVF  - Regular diet  - Is&Os 18yo F with chronic phase CML on Dasatinib here with fevers x4 days c/f SBI. Plan to continue IV Ceftriaxone and follow-up BCx and UCx. Patient on continuous telemetry for monitoring of tachycardia. Will continue mIVF and anti-nausea medicines as needed.  Viral URI likely 2/2 common, yet RVP negative. Bacteremia likely 2/2 immunocompromised and fevers. Myocarditis likely 2/2 tachycardia, chest pain and orthopnea. Dasatinib-induced HF vs pHTN vs pulmonary dz less likely given fevers. Strep throat less likely 2/2 throat clear and presence of cough. Meningitis less likely 2/2 FROM of neck, negative Kernigs and Brudzinsky. UTI less likely 2/2 no dysuria and UA without LE or nitrites, 0-2 WBCs. Appendicitis unlikely 2/2 benign abdominal exam. Pancreatitis unlikely 2/2 no N/V. Gastroenteritis unlikely 2/2 no N/V or diarrhea. Pneumonia unlikely 2/2 CTAB and CXR showed no focal consolidations.    #Resp:  - RA    #CV: Tachycardia  - EKG wnl  - tele    #ID: Fever r/o SBI  - IV CTX qd (2/21 - )  - RVP neg  - CXR clear  - f/u BCx  - f/u UCx  - Tylenol PRN    #Heme:  - Hgb stable (9.8)    #Onc: CML  - Hold: Dasatinib     #: Menstruating  - UA +large blood    #Derm: Rash  - Monitor    #FENGI: Constipation  - mIVF  - Regular diet  - Miralax qD  - Is&Os 18yo F with chronic phase CML on Dasatinib here with fevers x4 days c/f SBI. Plan to continue IV Ceftriaxone and follow-up BCx and UCx. Patient on continuous telemetry for monitoring of tachycardia. Will continue mIVF and anti-nausea medicines as needed.    Viral URI likely 2/2 common, yet RVP negative. Bacteremia likely 2/2 immunocompromised and fevers. Myocarditis likely 2/2 tachycardia, chest pain and orthopnea. Dasatinib-induced HF vs pHTN vs pulmonary dz less likely given fevers. Strep throat less likely 2/2 throat clear and presence of cough. Meningitis less likely 2/2 FROM of neck, negative Kernigs and Brudzinsky. UTI less likely 2/2 no dysuria and UA without LE or nitrites, 0-2 WBCs. Appendicitis unlikely 2/2 benign abdominal exam. Pancreatitis unlikely 2/2 no N/V. Gastroenteritis unlikely 2/2 no N/V or diarrhea. Pneumonia unlikely 2/2 CTAB and CXR showed no focal consolidations.    #Resp:  - RA    #CV: Tachycardia  - EKG wnl  - tele    #ID: Fever r/o SBI  - IV CTX qd (2/21 - )  - RVP neg  - CXR clear  - f/u BCx  - f/u UCx  - Tylenol PRN    #Heme:  - Hgb stable (9.8)    #Onc: CML  - Hold: Dasatinib     #: Menstruating  - UA +large blood    #Derm: Rash  - Monitor    #FENGI: Constipation  - mIVF  - Regular diet  - Miralax qD  - Is&Os

## 2023-02-22 NOTE — DISCHARGE NOTE PROVIDER - NSDCMRMEDTOKEN_GEN_ALL_CORE_FT
ondansetron 8 mg oral tablet, disintegratin tab(s) orally every 8 hours, As Needed -for nausea    dasatinib: orally once a day  ondansetron 8 mg oral tablet, disintegratin tab(s) orally every 8 hours, As Needed -for nausea    Clobetasol (Eqv-Temovate E) 0.05% topical cream: 1 application topically 2 times a day  dasatinib: orally once a day  ondansetron 8 mg oral tablet, disintegratin tab(s) orally every 8 hours, As Needed -for nausea    ciprofloxacin 500 mg oral tablet: 1 tab(s) orally 2 times a day   Clobetasol (Eqv-Temovate E) 0.05% topical cream: 1 application topically 2 times a day  diphenhydrAMINE 25 mg oral capsule: 1 cap(s) orally once a day (at bedtime)  loratadine 10 mg oral tablet: 1 tab(s) orally once a day  ondansetron 8 mg oral tablet, disintegratin tab(s) orally every 8 hours, As Needed -for nausea

## 2023-02-22 NOTE — H&P PEDIATRIC - ATTENDING COMMENTS
In brief, Sachin is a 17 years old female with CML chronic phase on Dasatinib who is admitted in the setting of 4 days of fever with persistent tachycardia. Reported to have sick contact at home last week and complains of painful raised rash on bilateral hips and abdomen, headache, light-headedness and URI symptoms.      In the ED, she is found to be persistent tachycardic despite 2 NS bolus. Blood culture obtained and started on CTX. She is admitted for further evaluation.     At the bedside today, her rash is prominent for raised confluent erythema rash and tender to palpate on bilateral hip area extend to upper thighs, 2x circular raised erythema rash and flat erythematous plagues on chest area. Raised the suspicious of erythema nodosum vs viral exanthema vs drug allergy vs leukemic infiltrate(given the history of CML). Will consult dermatology for possible biopsy. Will also send some additional lab tests to evaluate potential infectious etiology causing the rash. Continue Ceftriaxone and hydration.     Plan discussed with Onc fellow/PA/NP, residents, nursing, and pharmacist.

## 2023-02-23 ENCOUNTER — RESULT REVIEW (OUTPATIENT)
Age: 18
End: 2023-02-23

## 2023-02-23 LAB
BASOPHILS # BLD AUTO: 0.02 K/UL — SIGNIFICANT CHANGE UP (ref 0–0.2)
BASOPHILS NFR BLD AUTO: 0.5 % — SIGNIFICANT CHANGE UP (ref 0–2)
CMV IGG FLD QL: >10 U/ML — HIGH
CMV IGG SERPL-IMP: POSITIVE
CMV IGM FLD-ACNC: <8 AU/ML — SIGNIFICANT CHANGE UP
CMV IGM SERPL QL: NEGATIVE — SIGNIFICANT CHANGE UP
CRP SERPL-MCNC: 81.8 MG/L — HIGH
EBV EA AB SER IA-ACNC: <5 U/ML — SIGNIFICANT CHANGE UP
EBV EA AB TITR SER IF: NEGATIVE — SIGNIFICANT CHANGE UP
EBV EA IGG SER-ACNC: NEGATIVE — SIGNIFICANT CHANGE UP
EBV NA IGG SER IA-ACNC: <3 U/ML — SIGNIFICANT CHANGE UP
EBV PATRN SPEC IB-IMP: SIGNIFICANT CHANGE UP
EBV VCA IGG AVIDITY SER QL IA: NEGATIVE — SIGNIFICANT CHANGE UP
EBV VCA IGM SER IA-ACNC: <10 U/ML — SIGNIFICANT CHANGE UP
EBV VCA IGM SER IA-ACNC: <10 U/ML — SIGNIFICANT CHANGE UP
EBV VCA IGM TITR FLD: NEGATIVE — SIGNIFICANT CHANGE UP
EOSINOPHIL # BLD AUTO: 0.01 K/UL — SIGNIFICANT CHANGE UP (ref 0–0.5)
EOSINOPHIL NFR BLD AUTO: 0.2 % — SIGNIFICANT CHANGE UP (ref 0–6)
FERRITIN SERPL-MCNC: 68 NG/ML — SIGNIFICANT CHANGE UP (ref 15–150)
HCT VFR BLD CALC: 26 % — LOW (ref 34.5–45)
HGB BLD-MCNC: 7.9 G/DL — LOW (ref 11.5–15.5)
HSV1 IGG SER-ACNC: <0.01 INDEX — SIGNIFICANT CHANGE UP
HSV1 IGG SERPL QL IA: NEGATIVE — SIGNIFICANT CHANGE UP
HSV2 IGG FLD-ACNC: 0.09 INDEX — SIGNIFICANT CHANGE UP
HSV2 IGG SERPL QL IA: NEGATIVE — SIGNIFICANT CHANGE UP
IANC: 2.24 K/UL — SIGNIFICANT CHANGE UP (ref 1.8–7.4)
IMM GRANULOCYTES NFR BLD AUTO: 0.5 % — SIGNIFICANT CHANGE UP (ref 0–0.9)
IRON SATN MFR SERPL: 14 UG/DL — LOW (ref 30–160)
IRON SATN MFR SERPL: 5 % — LOW (ref 14–50)
LACTATE SERPL-SCNC: 0.8 MMOL/L — SIGNIFICANT CHANGE UP (ref 0.5–2)
LYMPHOCYTES # BLD AUTO: 1.44 K/UL — SIGNIFICANT CHANGE UP (ref 1–3.3)
LYMPHOCYTES # BLD AUTO: 34.2 % — SIGNIFICANT CHANGE UP (ref 13–44)
MCHC RBC-ENTMCNC: 23.4 PG — LOW (ref 27–34)
MCHC RBC-ENTMCNC: 30.4 GM/DL — LOW (ref 32–36)
MCV RBC AUTO: 76.9 FL — LOW (ref 80–100)
MONOCYTES # BLD AUTO: 0.48 K/UL — SIGNIFICANT CHANGE UP (ref 0–0.9)
MONOCYTES NFR BLD AUTO: 11.4 % — SIGNIFICANT CHANGE UP (ref 2–14)
NEUTROPHILS # BLD AUTO: 2.24 K/UL — SIGNIFICANT CHANGE UP (ref 1.8–7.4)
NEUTROPHILS NFR BLD AUTO: 53.2 % — SIGNIFICANT CHANGE UP (ref 43–77)
NRBC # BLD: 0 /100 WBCS — SIGNIFICANT CHANGE UP (ref 0–0)
NRBC # FLD: 0 K/UL — SIGNIFICANT CHANGE UP (ref 0–0)
OB PNL STL: NEGATIVE — SIGNIFICANT CHANGE UP
PLATELET # BLD AUTO: 112 K/UL — LOW (ref 150–400)
RBC # BLD: 3.38 M/UL — LOW (ref 3.8–5.2)
RBC # BLD: 3.38 M/UL — LOW (ref 3.8–5.2)
RBC # FLD: 16.9 % — HIGH (ref 10.3–14.5)
RETICS #: 24 K/UL — LOW (ref 25–125)
RETICS/RBC NFR: 0.7 % — SIGNIFICANT CHANGE UP (ref 0.5–2.5)
TIBC SERPL-MCNC: 256 UG/DL — SIGNIFICANT CHANGE UP (ref 220–430)
TRANSFERRIN SERPL-MCNC: 240 MG/DL — SIGNIFICANT CHANGE UP (ref 200–360)
UIBC SERPL-MCNC: 242 UG/DL — SIGNIFICANT CHANGE UP (ref 110–370)
VZV IGG FLD QL IA: 2613 INDEX — SIGNIFICANT CHANGE UP
VZV IGG FLD QL IA: POSITIVE — SIGNIFICANT CHANGE UP
WBC # BLD: 4.21 K/UL — SIGNIFICANT CHANGE UP (ref 3.8–10.5)
WBC # FLD AUTO: 4.21 K/UL — SIGNIFICANT CHANGE UP (ref 3.8–10.5)

## 2023-02-23 PROCEDURE — 88341 IMHCHEM/IMCYTCHM EA ADD ANTB: CPT | Mod: 26

## 2023-02-23 PROCEDURE — 88360 TUMOR IMMUNOHISTOCHEM/MANUAL: CPT | Mod: 26,59

## 2023-02-23 PROCEDURE — 99254 IP/OBS CNSLTJ NEW/EST MOD 60: CPT

## 2023-02-23 PROCEDURE — 99222 1ST HOSP IP/OBS MODERATE 55: CPT

## 2023-02-23 PROCEDURE — 88307 TISSUE EXAM BY PATHOLOGIST: CPT | Mod: 26

## 2023-02-23 PROCEDURE — 99233 SBSQ HOSP IP/OBS HIGH 50: CPT | Mod: GC

## 2023-02-23 PROCEDURE — 88342 IMHCHEM/IMCYTCHM 1ST ANTB: CPT | Mod: 26

## 2023-02-23 PROCEDURE — 88312 SPECIAL STAINS GROUP 1: CPT | Mod: 26

## 2023-02-23 RX ORDER — IRON SUCROSE 20 MG/ML
300 INJECTION, SOLUTION INTRAVENOUS ONCE
Refills: 0 | Status: COMPLETED | OUTPATIENT
Start: 2023-02-23 | End: 2023-02-23

## 2023-02-23 RX ADMIN — IRON SUCROSE 200 MILLIGRAM(S): 20 INJECTION, SOLUTION INTRAVENOUS at 16:39

## 2023-02-23 RX ADMIN — SODIUM CHLORIDE 100 MILLILITER(S): 9 INJECTION, SOLUTION INTRAVENOUS at 19:24

## 2023-02-23 RX ADMIN — PIPERACILLIN AND TAZOBACTAM 100 MILLIGRAM(S): 4; .5 INJECTION, POWDER, LYOPHILIZED, FOR SOLUTION INTRAVENOUS at 21:10

## 2023-02-23 RX ADMIN — SODIUM CHLORIDE 100 MILLILITER(S): 9 INJECTION, SOLUTION INTRAVENOUS at 07:11

## 2023-02-23 RX ADMIN — PIPERACILLIN AND TAZOBACTAM 100 MILLIGRAM(S): 4; .5 INJECTION, POWDER, LYOPHILIZED, FOR SOLUTION INTRAVENOUS at 09:15

## 2023-02-23 RX ADMIN — LORATADINE 10 MILLIGRAM(S): 10 TABLET ORAL at 10:21

## 2023-02-23 RX ADMIN — PIPERACILLIN AND TAZOBACTAM 100 MILLIGRAM(S): 4; .5 INJECTION, POWDER, LYOPHILIZED, FOR SOLUTION INTRAVENOUS at 03:03

## 2023-02-23 RX ADMIN — Medication 650 MILLIGRAM(S): at 16:13

## 2023-02-23 RX ADMIN — Medication 1 APPLICATION(S): at 21:10

## 2023-02-23 RX ADMIN — Medication 25 MILLIGRAM(S): at 21:09

## 2023-02-23 RX ADMIN — PIPERACILLIN AND TAZOBACTAM 100 MILLIGRAM(S): 4; .5 INJECTION, POWDER, LYOPHILIZED, FOR SOLUTION INTRAVENOUS at 15:18

## 2023-02-23 RX ADMIN — Medication 650 MILLIGRAM(S): at 17:04

## 2023-02-23 NOTE — CONSULT NOTE PEDS - SUBJECTIVE AND OBJECTIVE BOX
HPI:  16yo F with chronic phase CML on Dasatinib here with fevers x4 days. Patient states she woke up with a fever to 102F orally on Saturday. Mom gave her Motrin and she subsequently developed a red rash on her lower abdomen and b/l hips. Mom took her to her Pediatrician that day and she was tested for COVID/flu, which was negative. The PCP told Mom to call her Oncologist if she is still febrile after 3 days. On Monday, the patient had a fever to Tmax 103F orally. Yesterday, Mom called the Oncology office and was told to bring her to the ED. In addition to fevers, patient has had chills, headaches, lightheadedness and decreased appetite. Patient notes it hurts to turn her head to the left. No congestion, rhinorrhea, ear pain, sore throat. Patient also complains of cough since yesterday and now chest pain since last night and SOB when lying flat. No palpitations. Patient's period started on  and she has had a little abdominal pain with it. Patient admits to being constipated and has not had a BM since Friday. Denies N/V, diarrhea. No dysuria. +Sick contacts, Sister with cold last week. No recent travel. 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     ED Course: Febrile to 100.5F. CBC with Hb 9.8, plts 136. ANC 6.34. CMP with Na 131. UA with large blood. RVP negative. CXR negative. CTX x1. Reglan x1. NSB x2. Bcx and Ucx sent. Tachy to 110s, admitted.   (2023 01:34)      PAST MEDICAL HISTORY:  No pertinent past medical history    CML (chronic myeloid leukemia)        PAST SURGICAL HISTORY:  No significant past surgical history    Supernumerary digit        MEDICATIONS:  acetaminophen   Oral Tab/Cap - Peds. 650 milliGRAM(s) Oral every 6 hours PRN  dextrose 5% + sodium chloride 0.9%. - Pediatric 1000 milliLiter(s) IV Continuous <Continuous>  diphenhydrAMINE   Oral Tab/Cap - Peds 25 milliGRAM(s) Oral at bedtime  loratadine  Oral Tab/Cap - Peds 10 milliGRAM(s) Oral daily  piperacillin/tazobactam IV Intermittent - Peds 3000 milliGRAM(s) IV Intermittent every 6 hours  polyethylene glycol 3350 Oral Powder - Peds 17 Gram(s) Oral daily      ALLERGIES:  No Known Allergies      VITALS & I/Os:  Vital Signs Last 24 Hrs  T(C): 36.3 (2023 21:50), Max: 38.2 (2023 09:32)  T(F): 97.3 (2023 21:50), Max: 100.7 (2023 09:32)  HR: 107 (2023 21:50) (99 - 120)  BP: 104/75 (2023 21:50) (104/75 - 116/83)  BP(mean): --  RR: 18 (2023 21:50) (16 - 20)  SpO2: 95% (2023 21:50) (95% - 100%)    Parameters below as of 2023 21:50  Patient On (Oxygen Delivery Method): room air        I&O's Summary    2023 07:01  -  2023 07:00  --------------------------------------------------------  IN: 1700 mL / OUT: 80 mL / NET: 1620 mL    2023 07:01  -  2023 00:26  --------------------------------------------------------  IN: 3553.3 mL / OUT: 300 mL / NET: 3253.3 mL        PHYSICAL EXAM:  General: No acute distress  Respiratory: Nonlabored  Cardiovascular: RRR  Abdominal: Soft, nondistended, nontender. No rebound or guarding. No organomegaly, no palpable mass.  Extremities: Warm    LABS:                        8.0    6.29  )-----------( 102      ( 2023 11:02 )             26.3     02-    136  |  106  |  6<L>  ----------------------------<  102<H>  3.5   |  20<L>  |  0.46<L>    Ca    7.9<L>      2023 11:02  Phos  1.7       Mg     1.90         TPro  6.4  /  Alb  3.4  /  TBili  0.4  /  DBili  x   /  AST  13  /  ALT  14  /  AlkPhos  61      Lactate:        CARDIAC MARKERS ( 2023 11:02 )  x     / x     / x     / x     / <1.0 ng/mL        Urinalysis Basic - ( 2023 12:45 )    Color: Yellow / Appearance: Clear / S.021 / pH: x  Gluc: x / Ketone: Moderate  / Bili: Negative / Urobili: <2 mg/dL   Blood: x / Protein: 300 mg/dL / Nitrite: Negative   Leuk Esterase: Negative / RBC: >50 /HPF / WBC 0-2 /HPF   Sq Epi: x / Non Sq Epi: Few / Bacteria: Occasional        IMAGING:   HPI:  18yo F with chronic phase CML on Dasatinib currently admitted under medicine in view of fevers of unknown origin with fevers x4 days. Patient states she woke up with a fever to 102F orally on Saturday. Mom gave her Motrin and she subsequently developed a red rash on her lower abdomen and b/l hips. In addition to fevers, patient has had chills, headaches, lightheadedness and decreased appetite. Patient endorses she's currently having her periods she has had a little abdominal pain with it. Patient endorses to being constipated and did not have a BM since Friday however had one regular BM today and has been passing gas. Denies N/V, diarrhea. No dysuria.    Patient underwent US abdomen which was negative for ovarian/appendix pathology however did show some thickened cecum with trace fluid possible typhlitis. CTap w/ IV and PO con showed thickened bowel from cecum to splenic flexure, distribution that could be possible for ischemic colitis.    PAST MEDICAL HISTORY:  No pertinent past medical history    CML (chronic myeloid leukemia)        PAST SURGICAL HISTORY:  No significant past surgical history    Supernumerary digit        MEDICATIONS:  acetaminophen   Oral Tab/Cap - Peds. 650 milliGRAM(s) Oral every 6 hours PRN  dextrose 5% + sodium chloride 0.9%. - Pediatric 1000 milliLiter(s) IV Continuous <Continuous>  diphenhydrAMINE   Oral Tab/Cap - Peds 25 milliGRAM(s) Oral at bedtime  loratadine  Oral Tab/Cap - Peds 10 milliGRAM(s) Oral daily  piperacillin/tazobactam IV Intermittent - Peds 3000 milliGRAM(s) IV Intermittent every 6 hours  polyethylene glycol 3350 Oral Powder - Peds 17 Gram(s) Oral daily      ALLERGIES:  No Known Allergies      VITALS & I/Os:  Vital Signs Last 24 Hrs  T(C): 36.3 (2023 21:50), Max: 38.2 (2023 09:32)  T(F): 97.3 (2023 21:50), Max: 100.7 (2023 09:32)  HR: 107 (2023 21:50) (99 - 120)  BP: 104/75 (2023 21:50) (104/75 - 116/83)  BP(mean): --  RR: 18 (2023 21:50) (16 - 20)  SpO2: 95% (2023 21:50) (95% - 100%)    Parameters below as of 2023 21:50  Patient On (Oxygen Delivery Method): room air        I&O's Summary    2023 07:01  -  2023 07:00  --------------------------------------------------------  IN: 1700 mL / OUT: 80 mL / NET: 1620 mL    2023 07:01  -  2023 00:26  --------------------------------------------------------  IN: 3553.3 mL / OUT: 300 mL / NET: 3253.3 mL        PHYSICAL EXAM:  General: No acute distress  Respiratory: Nonlabored  Cardiovascular: RRR  Abdominal: Soft, nondistended, nontender. No rebound or guarding. No organomegaly, no palpable mass.  Extremities: Warm    LABS:                        8.0    6.29  )-----------( 102      ( 2023 11:02 )             26.3     02-22    136  |  106  |  6<L>  ----------------------------<  102<H>  3.5   |  20<L>  |  0.46<L>    Ca    7.9<L>      2023 11:02  Phos  1.7     02-22  Mg     1.90     02-22    TPro  6.4  /  Alb  3.4  /  TBili  0.4  /  DBili  x   /  AST  13  /  ALT  14  /  AlkPhos  61  02-22    Lactate:        CARDIAC MARKERS ( 2023 11:02 )  x     / x     / x     / x     / <1.0 ng/mL        Urinalysis Basic - ( 2023 12:45 )    Color: Yellow / Appearance: Clear / S.021 / pH: x  Gluc: x / Ketone: Moderate  / Bili: Negative / Urobili: <2 mg/dL   Blood: x / Protein: 300 mg/dL / Nitrite: Negative   Leuk Esterase: Negative / RBC: >50 /HPF / WBC 0-2 /HPF   Sq Epi: x / Non Sq Epi: Few / Bacteria: Occasional        IMAGING:

## 2023-02-23 NOTE — PROGRESS NOTE PEDS - SUBJECTIVE AND OBJECTIVE BOX
INTERVAL HPI/OVERNIGHT EVENTS:  S: Patient is a 17y old  Female who presents with a chief complaint of Fever (2023 08:39)    No acute overnight events. Skin symptoms stable.    MEDICATIONS  (STANDING):  dextrose 5% + sodium chloride 0.9%. - Pediatric 1000 milliLiter(s) (100 mL/Hr) IV Continuous <Continuous>  diphenhydrAMINE   Oral Tab/Cap - Peds 25 milliGRAM(s) Oral at bedtime  loratadine  Oral Tab/Cap - Peds 10 milliGRAM(s) Oral daily  piperacillin/tazobactam IV Intermittent - Peds 3000 milliGRAM(s) IV Intermittent every 6 hours    MEDICATIONS  (PRN):  acetaminophen   Oral Tab/Cap - Peds. 650 milliGRAM(s) Oral every 6 hours PRN Moderate Pain (4 - 6)      Allergies    No Known Allergies    Intolerances        REVIEW OF SYSTEMS      General: no fevers/chills, no NS	    Skin: see HPI  	  Ophthalmologic: no eye pain or change in vision    Genitourinary: no dysuria or hematuria    Musculoskeletal: no joint pains or weakness	    Neurological:no weakness or tingling          Vital Signs Last 24 Hrs  T(C): 36.8 (2023 10:56), Max: 37.5 (2023 19:02)  T(F): 98.2 (2023 10:56), Max: 99.5 (2023 19:02)  HR: 93 (2023 10:56) (93 - 110)  BP: 111/76 (2023 10:56) (94/58 - 116/83)  BP(mean): 88 (2023 10:56) (88 - 88)  RR: 24 (2023 10:56) (18 - 24)  SpO2: 97% (2023 10:56) (95% - 100%)    Parameters below as of 2023 10:56  Patient On (Oxygen Delivery Method): room air        PHYSICAL EXAM:  The patient was alert and oriented X 3, well nourished, and in no apparent distress.  There was no visible lymphadenopathy.  Conjunctiva were non injected  There was no clubbing or edema of extremities.    Of note on skin exam:      LABS:                        7.9    4.21  )-----------( 112      ( 2023 07:41 )             26.0         136  |  106  |  6<L>  ----------------------------<  102<H>  3.5   |  20<L>  |  0.46<L>    Ca    7.9<L>      2023 11:02  Phos  1.7       Mg     1.90         TPro  6.4  /  Alb  3.4  /  TBili  0.4  /  DBili  x   /  AST  13  /  ALT  14  /  AlkPhos  61        Urinalysis Basic - ( 2023 12:45 )    Color: Yellow / Appearance: Clear / S.021 / pH: x  Gluc: x / Ketone: Moderate  / Bili: Negative / Urobili: <2 mg/dL   Blood: x / Protein: 300 mg/dL / Nitrite: Negative   Leuk Esterase: Negative / RBC: >50 /HPF / WBC 0-2 /HPF   Sq Epi: x / Non Sq Epi: Few / Bacteria: Occasional         INTERVAL HPI/OVERNIGHT EVENTS:  S: Patient is a 17y old  Female who presents with a chief complaint of Fever (2023 08:39)  No acute overnight events, no overnight febrile episodes. Skin symptoms slightly better per patient but still has the burning sensation.    MEDICATIONS  (STANDING):  dextrose 5% + sodium chloride 0.9%. - Pediatric 1000 milliLiter(s) (100 mL/Hr) IV Continuous <Continuous>  diphenhydrAMINE   Oral Tab/Cap - Peds 25 milliGRAM(s) Oral at bedtime  loratadine  Oral Tab/Cap - Peds 10 milliGRAM(s) Oral daily  piperacillin/tazobactam IV Intermittent - Peds 3000 milliGRAM(s) IV Intermittent every 6 hours    MEDICATIONS  (PRN):  acetaminophen   Oral Tab/Cap - Peds. 650 milliGRAM(s) Oral every 6 hours PRN Moderate Pain (4 - 6)      Allergies    No Known Allergies    Intolerances        REVIEW OF SYSTEMS  General: no fevers/chills, no NS	  Skin: see HPI  Ophthalmologic: no eye pain or change in vision  Genitourinary: no dysuria or hematuria  Musculoskeletal: no joint pains or weakness	  Neurological: no weakness or tingling    Vital Signs Last 24 Hrs  T(C): 36.8 (2023 10:56), Max: 37.5 (2023 19:02)  T(F): 98.2 (2023 10:56), Max: 99.5 (2023 19:02)  HR: 93 (2023 10:56) (93 - 110)  BP: 111/76 (2023 10:56) (94/58 - 116/83)  BP(mean): 88 (2023 10:56) (88 - 88)  RR: 24 (2023 10:56) (18 - 24)  SpO2: 97% (2023 10:56) (95% - 100%)    Parameters below as of 2023 10:56  Patient On (Oxygen Delivery Method): room air        PHYSICAL EXAM:  The patient was alert and oriented X 3, well nourished, and in no apparent distress.  There was no visible lymphadenopathy.  Conjunctiva were non injected  There was no clubbing or edema of extremities.    Of note on skin exam:  - erythematous plaques on chest, trunk and upper thighs (overall less edematous than yesterday); erythema on chest as improved  - dermatographic     LABS:                        7.9    4.21  )-----------( 112      ( 2023 07:41 )             26.0         136  |  106  |  6<L>  ----------------------------<  102<H>  3.5   |  20<L>  |  0.46<L>    Ca    7.9<L>      2023 11:02  Phos  1.7       Mg     1.90         TPro  6.4  /  Alb  3.4  /  TBili  0.4  /  DBili  x   /  AST  13  /  ALT  14  /  AlkPhos  61        Urinalysis Basic - ( 2023 12:45 )    Color: Yellow / Appearance: Clear / S.021 / pH: x  Gluc: x / Ketone: Moderate  / Bili: Negative / Urobili: <2 mg/dL   Blood: x / Protein: 300 mg/dL / Nitrite: Negative   Leuk Esterase: Negative / RBC: >50 /HPF / WBC 0-2 /HPF   Sq Epi: x / Non Sq Epi: Few / Bacteria: Occasional      RADIOLOGY RESULTS:    ACC: [id number] EXAM:  US APPENDIX   ORDERED BY: RALPH HERNÁNDEZ     PROCEDURE DATE:  2023          INTERPRETATION:  CLINICAL INFORMATION: Abdominal pain.    COMPARISON: None available.    TECHNIQUE: Focused ultrasound of the right lower quadrant to evaluate the   appendix and cecum.    FINDINGS:  Appendix is normal in caliber and compressible. The tip measures 6 mm in   transverse diameter.  There is submucosal. There is mild hyperemia and trace fluid in the right   lower quadrant Thickening of the cecal wall    IMPRESSION:  Normal appendix. Thickening of the cecum with trace free fluid concerning   for typhlitis      ******PRELIMINARY REPORT******         ACC: [id number] EXAM:  CT ABDOMEN AND PELVIS OC IC   ORDERED BY: RALPH HERNÁNDEZ     PROCEDURE DATE:  2023    ******PRELIMINARY REPORT******      ******PRELIMINARY REPORT******           INTERPRETATION:  Mural thickening extending from the cecum to the splenic   flexure compatible with colitis, with a distribution that may suggest   ischemic colitis. The mesenteric vasculature, inclusive of the celiac   axis, SMA, ANGELIC, portal veins and SMV are patent.    Interlobular septal thickening is noted in the lung bases which may be seen in the setting of pulmonary edema. Moderate periportal edema.   Nonspecific gallbladder wall thickening. Normal appendix. Small volume  pelvic free fluid.    ******PRELIMINARY REPORT******

## 2023-02-23 NOTE — PROGRESS NOTE PEDS - SUBJECTIVE AND OBJECTIVE BOX
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 No acute interventions required. Pt is doing well overall. Denies abdominal pain. Endorses continued painful rash, small cough and chest pain when lying down which has been consistent for the past day.     Vital Signs Last 24 Hrs  T(C): 37 (23 Feb 2023 05:44), Max: 38.2 (22 Feb 2023 09:32)  T(F): 98.6 (23 Feb 2023 05:44), Max: 100.7 (22 Feb 2023 09:32)  HR: 103 (23 Feb 2023 05:44) (99 - 120)  BP: 94/58 (23 Feb 2023 05:44) (94/58 - 116/83)  BP(mean): --  RR: 18 (23 Feb 2023 05:44) (18 - 20)  SpO2: 96% (23 Feb 2023 05:44) (95% - 100%)    Parameters below as of 23 Feb 2023 05:44  Patient On (Oxygen Delivery Method): room air        PHYSICAL EXAM  General: sitting upright, well appearing, no apparent distress  HENT: moist mucous membranes, no mouth sores or mucosal bleeding, no conjunctival injection, neck supple, no masses  Cardio: tachycardic, regular rhythm, normal S1, S2, no murmurs, rubs or gallops, cap refill < 2 seconds  Respiratory: lungs to clear to auscultation bilaterally, no increased work of breathing  Abdomen: soft, nondistended, normoactive bowel sounds, no hepatosplenomegaly, no masses,  Lymphadenopathy: no adenopathy appreciated  Skin: tender edematous erythematous plaques scattered throughout lower trunk and pelvic girdle. Minimal change in size from the markings the day before.   Neuro: no focal neurological deficits noted    CYTOPENIAS                        8.0    6.29  )-----------( 102      ( 22 Feb 2023 11:02 )             26.3                         9.8    8.75  )-----------( 136      ( 21 Feb 2023 10:30 )             31.5     Auto Neutrophil %: 74.4 % (02-22-23 @ 11:02)  Auto Lymphocyte %: 15.9 % (02-22-23 @ 11:02)  Auto Monocyte %: 8.4 % (02-22-23 @ 11:02)  Auto Immature Granulocyte %: 1.1 % (02-22-23 @ 11:02)  Auto Neutrophil #: 4.68 K/uL (02-22-23 @ 11:02)    Targets:  Last Transfusion: n/a        INFECTIOUS RISK AND COMPLICATIONS  Central Line: none    Active infections:  Fever overnight? [] yes [x] no  Antimicrobials:  piperacillin/tazobactam IV Intermittent - Peds 3000 milliGRAM(s) IV Intermittent every 6 hours      Cultures:   Culture Results:   <10,000 CFU/mL Normal Urogenital Ana Maria (02-21 @ 12:45)  Culture Results:   No growth to date. (02-21 @ 10:30)      NUTRITIONAL DEFICIENCIES  Weight: Weight (kg): 63.8    I&Os:   02-22 @ 07:01  -  02-23 @ 07:00  --------------------------------------------------------  IN: 4153.3 mL / OUT: 300 mL / NET: 3853.3 mL        02-22 @ 07:01  -  02-23 @ 07:00  --------------------------------------------------------  IN:    dextrose 5% + sodium chloride 0.9% - Pediatric: 2400 mL    IV PiggyBack: 133.3 mL    Oral Fluid: 620 mL    Sodium Chloride 0.9% Bolus - Pediatric: 1000 mL  Total IN: 4153.3 mL    OUT:    Voided (mL): 300 mL  Total OUT: 300 mL    Total NET: 3853.3 mL          22 Feb 2023 11:02    136    |  106    |  6      ----------------------------<  102    3.5     |  20     |  0.46     Ca    7.9        22 Feb 2023 11:02  Phos  1.7       22 Feb 2023 11:02  Mg     1.90      22 Feb 2023 11:02    TPro  6.4    /  Alb  3.4    /  TBili  0.4    /  DBili  x      /  AST  13     /  ALT  14     /  AlkPhos  61     / Amylase x      /Lipase x      22 Feb 2023 11:02        IV Fluids: dextrose 5% + sodium chloride 0.9%. - Pediatric milliLiter(s) IV Continuous    TPN:  Glycemic Control:     acetaminophen   Oral Tab/Cap - Peds. 650 milliGRAM(s) Oral every 6 hours PRN  dextrose 5% + sodium chloride 0.9%. - Pediatric 1000 milliLiter(s) IV Continuous <Continuous>  polyethylene glycol 3350 Oral Powder - Peds 17 Gram(s) Oral daily      PAIN MANAGEMENT  acetaminophen   Oral Tab/Cap - Peds. 650 milliGRAM(s) Oral every 6 hours PRN      Pain score: rash tenderness 8/10    OTHER PROBLEMS  Hypertension? yes [] no[x]  Antihypertensives:     Premorbid conditions:     No Known Allergies      Other issues:    diphenhydrAMINE   Oral Tab/Cap - Peds 25 milliGRAM(s) Oral at bedtime  loratadine  Oral Tab/Cap - Peds 10 milliGRAM(s) Oral daily      PATIENT CARE ACCESS  [x] Peripheral IV  [] Central Venous Line	[] R	[] L	[] IJ	[] Fem	[] SC			[] Placed:  [] PICC:				[] Broviac		[] Mediport  [] Urinary Catheter, Date Placed:  [] Necessity of urinary, arterial, and venous catheters discussed    RADIOLOGY RESULTS:    ACC: [id number] EXAM:  US APPENDIX   ORDERED BY: RALPH HERNÁNDEZ     PROCEDURE DATE:  02/22/2023          INTERPRETATION:  CLINICAL INFORMATION: Abdominal pain.    COMPARISON: None available.    TECHNIQUE: Focused ultrasound of the right lower quadrant to evaluate the   appendix and cecum.    FINDINGS:  Appendix is normal in caliber and compressible. The tip measures 6 mm in   transverse diameter.  There is submucosal. There is mild hyperemia and trace fluid in the right   lower quadrant Thickening of the cecal wall    IMPRESSION:  Normal appendix. Thickening of the cecum with trace free fluid concerning   for typhlitis      ******PRELIMINARY REPORT******         ACC: [id number] EXAM:  CT ABDOMEN AND PELVIS OC IC   ORDERED BY: RALPH HERNÁNDEZ     PROCEDURE DATE:  02/22/2023    ******PRELIMINARY REPORT******      ******PRELIMINARY REPORT******           INTERPRETATION:  Mural thickening extending from the cecum to the splenic   flexure compatible with colitis, with a distribution that may suggest   ischemic colitis. The mesenteric vasculature, inclusive of the celiac   axis, SMA, ANGELIC, portal veins and SMV are patent.    Interlobular septal thickening is noted in the lung bases which may be   seen in the setting of pulmonary edema. Moderate periportal edema.   Nonspecific gallbladder wall thickening. Normal appendix. Small volume   pelvic free fluid.        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 No acute interventions required. Pt is doing well overall. Denies abdominal pain. Endorses continued painful rash, small cough and chest pain when lying down which has been consistent for the past day.     Vital Signs Last 24 Hrs  T(C): 37 (23 Feb 2023 05:44), Max: 38.2 (22 Feb 2023 09:32)  T(F): 98.6 (23 Feb 2023 05:44), Max: 100.7 (22 Feb 2023 09:32)  HR: 103 (23 Feb 2023 05:44) (99 - 120)  BP: 94/58 (23 Feb 2023 05:44) (94/58 - 116/83)  BP(mean): --  RR: 18 (23 Feb 2023 05:44) (18 - 20)  SpO2: 96% (23 Feb 2023 05:44) (95% - 100%)    Parameters below as of 23 Feb 2023 05:44  Patient On (Oxygen Delivery Method): room air        PHYSICAL EXAM  General: sitting upright, well appearing, no apparent distress  HENT: moist mucous membranes, no mouth sores or mucosal bleeding, no conjunctival injection, neck supple, no masses  Cardio: tachycardic, regular rhythm, normal S1, S2, no murmurs, rubs or gallops, cap refill < 2 seconds  Respiratory: small dry cough, lungs to clear to auscultation bilaterally, no increased work of breathing  Abdomen: soft, nondistended, normoactive bowel sounds, no hepatosplenomegaly, no masses,  Lymphadenopathy: no adenopathy appreciated  Skin: tender edematous erythematous plaques scattered throughout lower trunk and pelvic girdle. Minimal change in size from the markings the day before.   Neuro: no focal neurological deficits noted    CYTOPENIAS                        8.0    6.29  )-----------( 102      ( 22 Feb 2023 11:02 )             26.3                         9.8    8.75  )-----------( 136      ( 21 Feb 2023 10:30 )             31.5     Auto Neutrophil %: 74.4 % (02-22-23 @ 11:02)  Auto Lymphocyte %: 15.9 % (02-22-23 @ 11:02)  Auto Monocyte %: 8.4 % (02-22-23 @ 11:02)  Auto Immature Granulocyte %: 1.1 % (02-22-23 @ 11:02)  Auto Neutrophil #: 4.68 K/uL (02-22-23 @ 11:02)    Targets:  Last Transfusion: n/a        INFECTIOUS RISK AND COMPLICATIONS  Central Line: none    Active infections:  Fever overnight? [] yes [x] no  Antimicrobials:  piperacillin/tazobactam IV Intermittent - Peds 3000 milliGRAM(s) IV Intermittent every 6 hours      Cultures:   Culture Results:   <10,000 CFU/mL Normal Urogenital Ana Maria (02-21 @ 12:45)  Culture Results:   No growth to date. (02-21 @ 10:30)      NUTRITIONAL DEFICIENCIES  Weight: Weight (kg): 63.8    I&Os:   02-22 @ 07:01  -  02-23 @ 07:00  --------------------------------------------------------  IN: 4153.3 mL / OUT: 300 mL / NET: 3853.3 mL        02-22 @ 07:01  -  02-23 @ 07:00  --------------------------------------------------------  IN:    dextrose 5% + sodium chloride 0.9% - Pediatric: 2400 mL    IV PiggyBack: 133.3 mL    Oral Fluid: 620 mL    Sodium Chloride 0.9% Bolus - Pediatric: 1000 mL  Total IN: 4153.3 mL    OUT:    Voided (mL): 300 mL  Total OUT: 300 mL    Total NET: 3853.3 mL          22 Feb 2023 11:02    136    |  106    |  6      ----------------------------<  102    3.5     |  20     |  0.46     Ca    7.9        22 Feb 2023 11:02  Phos  1.7       22 Feb 2023 11:02  Mg     1.90      22 Feb 2023 11:02    TPro  6.4    /  Alb  3.4    /  TBili  0.4    /  DBili  x      /  AST  13     /  ALT  14     /  AlkPhos  61     / Amylase x      /Lipase x      22 Feb 2023 11:02        IV Fluids: dextrose 5% + sodium chloride 0.9%. - Pediatric milliLiter(s) IV Continuous    TPN:  Glycemic Control:     acetaminophen   Oral Tab/Cap - Peds. 650 milliGRAM(s) Oral every 6 hours PRN  dextrose 5% + sodium chloride 0.9%. - Pediatric 1000 milliLiter(s) IV Continuous <Continuous>  polyethylene glycol 3350 Oral Powder - Peds 17 Gram(s) Oral daily      PAIN MANAGEMENT  acetaminophen   Oral Tab/Cap - Peds. 650 milliGRAM(s) Oral every 6 hours PRN      Pain score: rash tenderness 8/10    OTHER PROBLEMS  Hypertension? yes [] no[x]  Antihypertensives:     Premorbid conditions:     No Known Allergies      Other issues:    diphenhydrAMINE   Oral Tab/Cap - Peds 25 milliGRAM(s) Oral at bedtime  loratadine  Oral Tab/Cap - Peds 10 milliGRAM(s) Oral daily      PATIENT CARE ACCESS  [x] Peripheral IV  [] Central Venous Line	[] R	[] L	[] IJ	[] Fem	[] SC			[] Placed:  [] PICC:				[] Broviac		[] Mediport  [] Urinary Catheter, Date Placed:  [] Necessity of urinary, arterial, and venous catheters discussed    RADIOLOGY RESULTS:    ACC: [id number] EXAM:  US APPENDIX   ORDERED BY: RALPH HERNÁNDEZ     PROCEDURE DATE:  02/22/2023          INTERPRETATION:  CLINICAL INFORMATION: Abdominal pain.    COMPARISON: None available.    TECHNIQUE: Focused ultrasound of the right lower quadrant to evaluate the   appendix and cecum.    FINDINGS:  Appendix is normal in caliber and compressible. The tip measures 6 mm in   transverse diameter.  There is submucosal. There is mild hyperemia and trace fluid in the right   lower quadrant Thickening of the cecal wall    IMPRESSION:  Normal appendix. Thickening of the cecum with trace free fluid concerning   for typhlitis      ******PRELIMINARY REPORT******         ACC: [id number] EXAM:  CT ABDOMEN AND PELVIS OC IC   ORDERED BY: RALPH HERNÁNDEZ     PROCEDURE DATE:  02/22/2023    ******PRELIMINARY REPORT******      ******PRELIMINARY REPORT******           INTERPRETATION:  Mural thickening extending from the cecum to the splenic   flexure compatible with colitis, with a distribution that may suggest   ischemic colitis. The mesenteric vasculature, inclusive of the celiac   axis, SMA, ANGELIC, portal veins and SMV are patent.    Interlobular septal thickening is noted in the lung bases which may be   seen in the setting of pulmonary edema. Moderate periportal edema.   Nonspecific gallbladder wall thickening. Normal appendix. Small volume   pelvic free fluid.        ******PRELIMINARY REPORT******        ACC: [id number] EXAM:  CT ABDOMEN AND PELVIS OC IC   ORDERED BY: RALPH HERNÁNDEZ     PROCEDURE DATE:  02/22/2023          INTERPRETATION:  CLINICAL INFORMATION: CLL, Typhlitis    COMPARISON: None.    CONTRAST/COMPLICATIONS:  IV Contrast: Omnipaque 300  90 cc administered   10 cc discarded  Oral Contrast: Omnipaque 300   Fruit 2o  Complications: None reported at time of study completion    PROCEDURE:  CT of the Abdomen and Pelvis was performed.  Sagittal and coronal reformats were performed.    FINDINGS:  LOWER CHEST: Subsegmental atelectasis and small right pleural effusion.    LIVER: There is no mass. Periportal edema is noted.  BILE DUCTS: Normal caliber.  GALLBLADDER: Pericholecystic fluid. The gallbladder is otherwise normal   in appearance.  SPLEEN: Within normal limits.  PANCREAS: Within normal limits.  ADRENALS: Within normal limits.  KIDNEYS/URETERS: Within normal limits.    BLADDER: Within normal limits.  REPRODUCTIVE ORGANS: Uterus and adnexa within normal limits.    BOWEL: No bowel obstruction. The cecum and ascending colon is markedly   thickened and there is small amount of surrounding free fluid. The small   bowel is normal in appearance. PERITONEUM: Moderate pelvic ascites.  VESSELS: Within normal limits.  RETROPERITONEUM/LYMPH NODES: No lymphadenopathy.  ABDOMINAL WALL: Soft tissue edema.  BONES: Within normal limits.    IMPRESSION:  Typhlitis

## 2023-02-23 NOTE — CONSULT NOTE PEDS - ASSESSMENT
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 GI consulted for colitis on CT scan. Patient with resolved abdominal pain, no diarrhea and no blood in stool. Feels well today. DDx is broad. Most likely acute infectious given associated fever/rash. Possible medication induced as dasatinib induced colitis is a well described entity. Much less likely IBD given acuity of symptoms, self resolving abdominal pain, no diarrhea or blood in stool.     Would obtain GI PCR, baseline calprotectin.  Will follow up in GI clinic in one month, to consider repeat imaging at that time.  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 GI consulted due to finding of colitis on abd CT scan. Patient with resolved abdominal pain, no diarrhea and no blood in stool. Feels well today. DDx is broad. Most likely acute infectious given associated fever/rash. Possible medication induced as dasatinib induced colitis is a well described entity. Much less likely IBD given acuity of symptoms, self resolving abdominal pain, no diarrhea or blood in stool.     Would obtain GI PCR, baseline calprotectin.  Monitor clinical status  Will follow up in GI clinic in one month, to consider repeat imaging at that time.

## 2023-02-23 NOTE — CONSULT NOTE PEDS - ATTENDING COMMENTS
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 GI consulted due to finding of colitis on abd CT scan. Patient with resolved abdominal pain, no diarrhea and no blood in stool. Feels well today. DDx is broad. Most likely acute infectious given associated fever/rash. Possible medication induced as dasatinib induced colitis is a well described entity. Much less likely IBD given acuity of symptoms, self resolving abdominal pain, no diarrhea or blood in stool.     Would obtain GI PCR, baseline calprotectin.  Monitor clinical status  Will follow up in GI clinic in one month, to consider repeat imaging at that time.     Radiographic studies reviewed with radiology
agree with above; would encourage biopsy if family amenable. will continue to follow
16 yo female with CML, presents with fever.  Now denying abdominal pain.  No diarrhea or vomiting.  Abdomen is soft, nontender on exam.  CT abdomen shows thickening of ascending colon and some pericholecystic fluid.  Otherwise unremarkable.  It appears that pt has a mild inflammatory condition of the ascending colon.  Agree that course of abx is reasonable.  Given that clinical exam is so unimpressive and pt is not neutropenic, consider allowing the patient to eat.

## 2023-02-23 NOTE — PROGRESS NOTE PEDS - ASSESSMENT
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 ED workup for fevers demonstrated neg RVP, neg UCx, neg Chest x-ray; pt began Ceftriaxone and has since been afebrile. Regarding the abdominal pain, the patient was worked up with a US that was concerning for typhlitis; CT scan preliminary report suggested possible ischemic colitis however the final CT read was consistent with typhlitis. Pt has been NPO since 2/22 on maintenance fluid with abx coverage of Zosyn. Surgery was consulted regarding possible ischemic colitis and suggested NPO and continued monitoring with abd exams. Current differential for abd pain include typhlitis given imaging findings, however patient is not neutropenic. Additional differential include medication (Dasatinib) related colitis with possible CMV reactivation, although patient has been off Dasatinib since 2/20 without improvement in sx. Additional differential includes IBD, though patient reports normal stools within the last year without previous abd pain. Regarding the rash - patient has been on Benedry and Claritin without improvement in size or tenderness. Continue to monitor and evaluate with future skin biopsy to help rule out possible skin malignancy.     Plan:    #Fever   - Zosyn q6, switch on night of 2/22 due to concern for GI pathology   - Completed CTX (2/21-2/22)  - RVP neg, Chest x-ray neg, Viral work-up Neg, Strep Neg  - Tylenol PRN   - UCx NG   - Follow up BCx   - Afebrile for the last ~48hr, send BCx if pt becomes febrile     #CML   - Continue to hold Dasatinib     #Abd pain   - CT imagining concerning for typhlitis   - Pt made NPO on 2/22, IV maintenance fluid  - Surgery consulted due to initial concern for ischemic colitis; no acute intervention required   - Consult GI regarding current differential, appreciate recommendations  - Stool guiac  - Trend CRP, ESR, fibrinogen, ferritin as part of inflammatory marker review     #Rash   - Continues to have edematous plaques, dermatology consulted   - recommended Benadryl, Claritin   - Biopsy to r/o possible malignancy with follow-up once full results return     #Anemia   - last Hgb 7.9, total iron found to be 14  - Replete with IV iron single dose    - Morning CBC, morning CMP     #FENGI   - NPO, maintenance fluid   - progress diet as per surgery/GI               Pt is a 16yo F with 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 ED workup for fevers demonstrated neg RVP, neg UCx, neg Chest x-ray; pt began Ceftriaxone and has since been afebrile. Regarding the abdominal pain, the patient was worked up with a US that was concerning for typhlitis; CT scan preliminary report suggested possible ischemic colitis however the final CT read was consistent with typhlitis. Pt has been NPO since 2/22 on maintenance fluid with abx coverage of Zosyn. Surgery was consulted regarding possible ischemic colitis and suggested NPO and continued monitoring with abd exams. Current differential for abd pain include typhlitis given imaging findings, however patient is not neutropenic. Additional differential include medication (Dasatinib) related colitis with possible CMV reactivation, although patient has been off Dasatinib since 2/20 without improvement in sx. Additional differential includes IBD, though patient reports normal stools within the last year without previous abd pain. Regarding the rash - patient has been on Benedry and Claritin without improvement in size or tenderness. Continue to monitor and evaluate with future skin biopsy to help rule out possible skin malignancy.     Plan:    #Fever   - Zosyn q6, switch on night of 2/22 due to concern for GI pathology   - Completed CTX (2/21-2/22)  - RVP neg, Chest x-ray neg, Viral work-up Neg, Strep Neg  - Tylenol PRN   - UCx NG   - Follow up BCx   - Afebrile for the last ~48hr, send BCx if pt becomes febrile   - mycoplasm labs follow up     #CML   - Continue to hold Dasatinib     #Abd pain   - CT imagining concerning for typhlitis   - Pt made NPO on 2/22, IV maintenance fluid  - Surgery consulted due to initial concern for ischemic colitis; no acute intervention required   - Consult GI regarding current differential, appreciate recommendations  - Stool guiac  - Trend CRP, ESR, fibrinogen, ferritin as part of inflammatory marker review     #Rash   - Continues to have edematous plaques, dermatology consulted   - recommended Benadryl, Claritin   - Biopsy to r/o possible malignancy with follow-up once full results return     #Anemia   - last Hgb 7.9, total iron found to be 14  - Replete with IV iron single dose    - Morning CBC, morning CMP     #Respiratory   - RA  - If cough progresses, order Chest X-ray    #FENGI   - NPO, maintenance fluid   - progress diet as per surgery/GI

## 2023-02-23 NOTE — PROGRESS NOTE PEDS - ASSESSMENT
ASSESSMENT/PLAN:  #Edematous erythematous plaques - Given patient's recent sick contact and symptoms (cough, constipation, SOB), favor a reaction to a viral illness. However, given her concomitant CML, offered a skin biopsy with H&E, which patient and mom (Svetlana) deferred at this time. Plan to continue to monitor skin over the course of the next few days with low threshold to perform a skin biopsy  - start Claritin 10 mg PO daily and Benadryl PO qHs    Recommendations were communicated with the primary team.  The patient's chart was reviewed in addition to being examined at bedside with the dermatology attending. Discussed plans with the dermatology attending, Dr. Tam  Dermatology will continue to follow. Thank you for consulting our service.    Samira Hess MD MPH  Resident Physician, PGY3  [facility]  Office: [phone]  Pager: [phone]  **Please page with 10-DIGIT callback # for further related questions.** ASSESSMENT/PLAN:  #Edematous erythematous plaques - Given patient's recent sick contact and symptoms (cough, SOB), favor a reactive process (e.g., serum sickness like reaction). However, given her concomitant CML, Sweet's syndrome is another possibility or even less likely, leukemia cutis, though we would typically see a different morphology.  - At this juncture, performed a skin biopsy for H&E as below. Please follow biopsy site care instructions.  - Offered to add another antihistamine to the patient's regimen, however mom declines at this time. Can c/w Claritin 10 mg PO daily and Benadryl 25mg PO qHs  - For the affected areas on the body: start clobetasol 0.05% cream BID for up to 2 weeks on / 1 week off. Repeat cycles as necessary. Please ask the pharmacy to dispense multiple tubes at once to cover the large body surface area affected.    ----  Dermatology Punch Biopsy Procedure Note    After risks and benefits of procedure including bleeding, infection and scar were reviewed (consents including photo consent reviewed, signed and in chart), allergies were reviewed and time out performed. Written consent given by the patient. Discussed procedure with mom (Svetlana) over the phone.    Area cleaned with rubbing alcohol and anesthetized with lidocaine and epinephrine.  #1, 4mm punch biopsies were performed to R flank, hemostasis achieved with a single dissolvable chromic gut suture with pressure dressing placed. Wound care reviewed with patient and team. Biopsy site should remain covered with pressure bandage for 48 hours. Then apply Vaseline to biopsy site daily and cover with bandage until healed.  ---    Recommendations were communicated with the primary team.  The patient's chart was reviewed in addition to being examined at bedside with the dermatology attending. Discussed plans with the dermatology attending, Dr. Tam  Dermatology will continue to follow. Thank you for consulting our service.    Samira Hess MD MPH  Resident Physician, PGY3  [facility]  Office: [phone]  Pager: [phone]  **Please page with 10-DIGIT callback # for further related questions.** ASSESSMENT/PLAN:  #Edematous erythematous plaques - Given patient's recent sick contact and symptoms (cough, SOB), favor a reactive process. Although patient denies starting any new medications other than Motrin, serum sickness like reaction can also be considered. Given her concomitant CML, Sweet's syndrome is another possibility or even less likely, leukemia cutis, where the morphology, different from this patient’s, is typically seen.  - At this juncture, performed a skin biopsy for H&E as below. Please follow biopsy site care instructions.  - Offered to add another antihistamine to the patient's regimen, however mom declines at this time. Can c/w Claritin 10 mg PO daily and Benadryl 25mg PO qHs  - For the affected areas on the body: start clobetasol 0.05% cream BID for up to 2 weeks on / 1 week off. Repeat cycles as necessary. Please ask the pharmacy to dispense multiple tubes at once to cover the large body surface area affected.    ----  Dermatology Punch Biopsy Procedure Note    After risks and benefits of procedure including bleeding, infection and scar were reviewed (consents including photo consent reviewed, signed and in chart), allergies were reviewed and time out performed. Written consent given by the patient. Discussed procedure with mom (Svetlana) over the phone.    Area cleaned with rubbing alcohol and anesthetized with lidocaine and epinephrine.  #1, 4mm punch biopsies were performed to R flank, hemostasis achieved with a single dissolvable chromic gut suture with pressure dressing placed. Wound care reviewed with patient and team. Biopsy site should remain covered with pressure bandage for 48 hours. Then apply Vaseline to biopsy site daily and cover with bandage until healed.  ---    Recommendations were communicated with the primary team.  The patient's chart was reviewed in addition to being examined at bedside with the dermatology attending. Discussed plans with the dermatology attending, Dr. Tam  Dermatology will continue to follow. Thank you for consulting our service.    Samira Hess MD MPH  Resident Physician, PGY3  [facility]  Office: [phone]  Pager: [phone]  **Please page with 10-DIGIT callback # for further related questions.** ASSESSMENT/PLAN:  #Edematous erythematous plaques - Given patient's recent sick contact and symptoms (cough, SOB), favor a reactive process. Although patient denies starting any new medications other than Motrin, serum sickness like reaction can also be considered. Sweet's syndrome is another possibility or even less likely, leukemia cutis but unlikely as her concomitant CML is well-controlled and the morphology is typically different from this patient's  - At this juncture, performed a skin biopsy for H&E as below. Please follow biopsy site care instructions.  - Offered to add another antihistamine to the patient's regimen, however mom declines at this time. Can c/w Claritin 10 mg PO daily and Benadryl 25mg PO qHs  - For the affected areas on the body: start clobetasol 0.05% cream BID for up to 2 weeks on / 1 week off. Repeat cycles as necessary. Please ask the pharmacy to dispense multiple tubes at once to cover the large body surface area affected.    ----  Dermatology Punch Biopsy Procedure Note    After risks and benefits of procedure including bleeding, infection and scar were reviewed (consents including photo consent reviewed, signed and in chart), allergies were reviewed and time out performed. Written consent given by the patient. Discussed procedure with mom (Svetlana) over the phone.    Area cleaned with rubbing alcohol and anesthetized with lidocaine and epinephrine.  #1, 4mm punch biopsies were performed to R flank, hemostasis achieved with a single dissolvable chromic gut suture with pressure dressing placed. Wound care reviewed with patient and team. Biopsy site should remain covered with pressure bandage for 48 hours. Then apply Vaseline to biopsy site daily and cover with bandage until healed.  ---    Recommendations were communicated with the primary team.  The patient's chart was reviewed in addition to being examined at bedside with the dermatology attending. Discussed plans with the dermatology attending, Dr. Tam  Dermatology will continue to follow. Thank you for consulting our service.    Samira Hess MD MPH  Resident Physician, PGY3  [facility]  Office: [phone]  Pager: [phone]  **Please page with 10-DIGIT callback # for further related questions.**

## 2023-02-23 NOTE — CONSULT NOTE PEDS - SUBJECTIVE AND OBJECTIVE BOX
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 Associated rash on her lower abdomen and b/l hips. In addition to fevers, patient has had chills, headaches, lightheadedness and decreased appetite, chest pain and SOB. No palpitations.  Denies N/V, diarrhea or blood in stool. No dysuria. Minimal abdominal pain yesterday, now resolved. No stool for 3 days but had bowel movement yesterday, soft, no blood.     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 Neutropenia resolved.     ED Course: Febrile to 100.5F. CBC with Hb 9.8, plts 136. ANC 6.34. CMP with Na 131. UA with large blood. RVP negative. CXR negative. CTX x1. Reglan x1. NSB x2. Bcx and Ucx sent. Tachy to 110s, admitted.    CT consistent with R sided colitis, without chronic changes.     Allergies  No Known Allergies  Intolerances      MEDICATIONS  (STANDING):  dextrose 5% + sodium chloride 0.9%. - Pediatric 1000 milliLiter(s) (100 mL/Hr) IV Continuous <Continuous>  diphenhydrAMINE   Oral Tab/Cap - Peds 25 milliGRAM(s) Oral at bedtime  iron sucrose IV Intermittent - Peds 300 milliGRAM(s) IV Intermittent once  loratadine  Oral Tab/Cap - Peds 10 milliGRAM(s) Oral daily  piperacillin/tazobactam IV Intermittent - Peds 3000 milliGRAM(s) IV Intermittent every 6 hours    MEDICATIONS  (PRN):  acetaminophen   Oral Tab/Cap - Peds. 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   HEENT:   No eye pain, no vision changes, no icterus, no mouth ulcers.  Respiratory:   No shortness of breath, no cough, no respiratory distress.   Cardiovascular:   No chest pain, no palpitations.   Skin:   + rashes, no jaundice, no eczema.   Musculoskeletal:   No joint pain, no swelling, no myalgia.   Neurologic:   No headache, no seizure, no weakness.   Genitourinary:   No dysuria, no decreased urine output.  Psychiatric:  No depression, no anxiety, no PDD, no ADHD.  Endocrine:   No thyroid disease, no diabetes.  Heme/Lymphatic:   + anemia, no blood transfusions, no lymph node enlargement, no bleeding, no bruising.    Daily   BMI: 24.3 (02-22 @ 01:08)  Change in Weight:  Vital Signs Last 24 Hrs  T(C): 37 (23 Feb 2023 14:05), Max: 37.5 (22 Feb 2023 19:02)  T(F): 98.6 (23 Feb 2023 14:05), Max: 99.5 (22 Feb 2023 19:02)  HR: 101 (23 Feb 2023 14:05) (93 - 110)  BP: 117/80 (23 Feb 2023 14:05) (94/58 - 117/80)  BP(mean): 88 (23 Feb 2023 10:56) (88 - 88)  RR: 22 (23 Feb 2023 14:05) (18 - 24)  SpO2: 96% (23 Feb 2023 14:05) (95% - 98%)    Parameters below as of 23 Feb 2023 14:05  Patient On (Oxygen Delivery Method): room air      I&O's Detail    22 Feb 2023 07:01  -  23 Feb 2023 07:00  --------------------------------------------------------  IN:    dextrose 5% + sodium chloride 0.9% - Pediatric: 2400 mL    IV PiggyBack: 133.3 mL    Oral Fluid: 620 mL    Sodium Chloride 0.9% Bolus - Pediatric: 1000 mL  Total IN: 4153.3 mL    OUT:    Voided (mL): 300 mL  Total OUT: 300 mL    Total NET: 3853.3 mL      23 Feb 2023 07:01  -  23 Feb 2023 15:34  --------------------------------------------------------  IN:    dextrose 5% + sodium chloride 0.9% - Pediatric: 700 mL  Total IN: 700 mL    OUT:  Total OUT: 0 mL    Total NET: 700 mL      PHYSICAL EXAM  General:  Well developed, well nourished, alert and active, no pallor, NAD.  HEENT:    Normal appearance of conjunctiva, ears, nose, lips, oropharynx, and oral mucosa, anicteric.  Neck:  No masses, no asymmetry.  Lymph Nodes:  No lymphadenopathy.   Cardiovascular:  RRR normal S1/S2, no murmur.  Respiratory:  CTA B/L, normal respiratory effort.   Abdominal:   soft, no masses or tenderness, normoactive BS, NT/ND, no HSM.  Extremities:   No clubbing or cyanosis, normal capillary refill, no edema.   Skin:   + rash, jaundice, lesions, eczema.   Musculoskeletal:  No joint swelling, erythema or tenderness.   Neuro: No focal deficits.       Lab Results:                        7.9    4.21  )-----------( 112      ( 23 Feb 2023 07:41 )             26.0     02-22    136  |  106  |  6<L>  ----------------------------<  102<H>  3.5   |  20<L>  |  0.46<L>    Ca    7.9<L>      22 Feb 2023 11:02  Phos  1.7     02-22  Mg     1.90     02-22    TPro  6.4  /  Alb  3.4  /  TBili  0.4  /  DBili  x   /  AST  13  /  ALT  14  /  AlkPhos  61  02-22    LIVER FUNCTIONS - ( 22 Feb 2023 11:02 )  Alb: 3.4 g/dL / Pro: 6.4 g/dL / ALK PHOS: 61 U/L / ALT: 14 U/L / AST: 13 U/L / GGT: x           C-Reactive Protein, Serum: 81.8 mg/L (02-23 @ 07:41)       Patient is a 17y old  Female who presents with a chief complaint of Fever    HPI:  16yo F with chronic phase CML on Dasatinib here with fevers x4 days, Tm 103F. Associated rash on her lower abdomen and b/l hips. In addition to fevers, patient has had chills, headaches, lightheadedness and decreased appetite, chest pain and SOB. No palpitations.  Denies N/V, diarrhea or blood in stool. No dysuria. Minimal abdominal pain yesterday, now resolved. No stool for 3 days but had bowel movement yesterday, soft, no blood.     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 Neutropenia resolved.     ED Course: Febrile to 100.5F. CBC with Hb 9.8, plts 136. ANC 6.34. CMP with Na 131. UA with large blood. RVP negative. CXR negative. CTX x1. Reglan x1. NSB x2. Bcx and Ucx sent. Tachy to 110s, admitted.    CT consistent with R sided colitis, without chronic changes.     Allergies  No Known Allergies  Intolerances      MEDICATIONS  (STANDING):  dextrose 5% + sodium chloride 0.9%. - Pediatric 1000 milliLiter(s) (100 mL/Hr) IV Continuous <Continuous>  diphenhydrAMINE   Oral Tab/Cap - Peds 25 milliGRAM(s) Oral at bedtime  iron sucrose IV Intermittent - Peds 300 milliGRAM(s) IV Intermittent once  loratadine  Oral Tab/Cap - Peds 10 milliGRAM(s) Oral daily  piperacillin/tazobactam IV Intermittent - Peds 3000 milliGRAM(s) IV Intermittent every 6 hours    MEDICATIONS  (PRN):  acetaminophen   Oral Tab/Cap - Peds. 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   HEENT:   No eye pain, no vision changes, no icterus, no mouth ulcers.  Respiratory:   No shortness of breath, no cough, no respiratory distress.   Cardiovascular:   No chest pain, no palpitations.   Skin:   + rashes, no jaundice, no eczema.   Musculoskeletal:   No joint pain, no swelling, no myalgia.   Neurologic:   No headache, no seizure, no weakness.   Genitourinary:   No dysuria, no decreased urine output.  Psychiatric:  No depression, no anxiety, no PDD, no ADHD.  Endocrine:   No thyroid disease, no diabetes.  Heme/Lymphatic:   + anemia, no blood transfusions, no lymph node enlargement, no bleeding, no bruising.    Daily   BMI: 24.3 (02-22 @ 01:08)  Change in Weight:  Vital Signs Last 24 Hrs  T(C): 37 (23 Feb 2023 14:05), Max: 37.5 (22 Feb 2023 19:02)  T(F): 98.6 (23 Feb 2023 14:05), Max: 99.5 (22 Feb 2023 19:02)  HR: 101 (23 Feb 2023 14:05) (93 - 110)  BP: 117/80 (23 Feb 2023 14:05) (94/58 - 117/80)  BP(mean): 88 (23 Feb 2023 10:56) (88 - 88)  RR: 22 (23 Feb 2023 14:05) (18 - 24)  SpO2: 96% (23 Feb 2023 14:05) (95% - 98%)    Parameters below as of 23 Feb 2023 14:05  Patient On (Oxygen Delivery Method): room air      I&O's Detail    22 Feb 2023 07:01  -  23 Feb 2023 07:00  --------------------------------------------------------  IN:    dextrose 5% + sodium chloride 0.9% - Pediatric: 2400 mL    IV PiggyBack: 133.3 mL    Oral Fluid: 620 mL    Sodium Chloride 0.9% Bolus - Pediatric: 1000 mL  Total IN: 4153.3 mL    OUT:    Voided (mL): 300 mL  Total OUT: 300 mL    Total NET: 3853.3 mL      23 Feb 2023 07:01  -  23 Feb 2023 15:34  --------------------------------------------------------  IN:    dextrose 5% + sodium chloride 0.9% - Pediatric: 700 mL  Total IN: 700 mL    OUT:  Total OUT: 0 mL    Total NET: 700 mL      PHYSICAL EXAM  General:  Well developed, well nourished, alert and active, no pallor, NAD.  HEENT:    Normal appearance of conjunctiva, ears, nose, lips, oropharynx, and oral mucosa, anicteric.  Neck:  No masses, no asymmetry.  Lymph Nodes:  No lymphadenopathy.   Cardiovascular:  RRR normal S1/S2, no murmur.  Respiratory:  CTA B/L, normal respiratory effort.   Abdominal:   soft, no masses or tenderness, normoactive BS, NT/ND, no HSM.  No perianal dz  Extremities:   No clubbing or cyanosis, normal capillary refill, no edema.   Skin:   + rash, jaundice, lesions, eczema.   Musculoskeletal:  No joint swelling, erythema or tenderness.   Neuro: No focal deficits.       Lab Results:                        7.9    4.21  )-----------( 112      ( 23 Feb 2023 07:41 )             26.0     02-22    136  |  106  |  6<L>  ----------------------------<  102<H>  3.5   |  20<L>  |  0.46<L>    Ca    7.9<L>      22 Feb 2023 11:02  Phos  1.7     02-22  Mg     1.90     02-22    TPro  6.4  /  Alb  3.4  /  TBili  0.4  /  DBili  x   /  AST  13  /  ALT  14  /  AlkPhos  61  02-22    LIVER FUNCTIONS - ( 22 Feb 2023 11:02 )  Alb: 3.4 g/dL / Pro: 6.4 g/dL / ALK PHOS: 61 U/L / ALT: 14 U/L / AST: 13 U/L / GGT: x           C-Reactive Protein, Serum: 81.8 mg/L (02-23 @ 07:41)

## 2023-02-23 NOTE — CONSULT NOTE PEDS - ASSESSMENT
- Stat lactate  - While low concern for ischemic colitis on physical exam, significant thickened walls on CT.  - Recommend adding flagyl to abx coverage (currently on zosyn)  - Bowel rest, IVF  - Serial abdominal exams    Discussed with Dr. Morales 18yo F with chronic phase CML on Dasatinib currently admitted under medicine in view of fevers of unknown origin with fevers x4 days. Patient endorses she's currently having her periods she has had a mild abdominal pain with it. Patient endorses to being constipated and did not have a BM since Friday however had one regular BM today and has been passing gas. Denies N/V, diarrhea. Endorses tolerating dinner well yesterday. US abdomen which was negative for ovarian/appendix pathology however did show some thickened cecum with trace fluid possible typhlitis. CTap w/ IV and PO con showed thickened bowel from cecum to splenic flexure, distribution that could be possible for ischemic colitis. White count 6.2 with no left shift however unreliable in setting of CML on Dasatinib    - Stat lactate  - While low concern for ischemic colitis on physical exam, significant thickened walls on CT.  - Recommend adding flagyl to abx coverage (currently on zosyn)  - Bowel rest, IVF  - Serial abdominal exams    ADDENDUM: Lactate reviewed 0.8    Discussed with Dr. Morales 18yo F with chronic phase CML on Dasatinib currently admitted under medicine in view of fevers of unknown origin with fevers x4 days. Patient endorses she's currently having her periods she has had a mild abdominal pain with it. Patient endorses to being constipated and did not have a BM since Friday however had one regular BM today and has been passing gas. Denies N/V, diarrhea. Endorses tolerating dinner well yesterday. US abdomen which was negative for ovarian/appendix pathology however did show some thickened cecum with trace fluid possible typhlitis. CTap w/ IV and PO con showed thickened bowel from cecum to splenic flexure, distribution that could be possible for ischemic colitis. White count 6.2 with no left shift however unreliable in setting of CML on Dasatinib    - Stat lactate  - Low concern for ischemic colitis at this time due to benign physical exam  - From surgical perspective, can start CLD. Stop and reassess if any new GI symptoms develop.   - Cont. Zosyn  - Serial abdominal exams  - Will follow

## 2023-02-24 LAB
ALBUMIN SERPL ELPH-MCNC: 3.1 G/DL — LOW (ref 3.3–5)
ALP SERPL-CCNC: 59 U/L — SIGNIFICANT CHANGE UP (ref 40–120)
ALT FLD-CCNC: 14 U/L — SIGNIFICANT CHANGE UP (ref 4–33)
ANION GAP SERPL CALC-SCNC: 10 MMOL/L — SIGNIFICANT CHANGE UP (ref 7–14)
ANISOCYTOSIS BLD QL: SLIGHT — SIGNIFICANT CHANGE UP
AST SERPL-CCNC: 14 U/L — SIGNIFICANT CHANGE UP (ref 4–32)
BASOPHILS # BLD AUTO: 0 K/UL — SIGNIFICANT CHANGE UP (ref 0–0.2)
BASOPHILS NFR BLD AUTO: 0 % — SIGNIFICANT CHANGE UP (ref 0–2)
BILIRUB SERPL-MCNC: 0.4 MG/DL — SIGNIFICANT CHANGE UP (ref 0.2–1.2)
BUN SERPL-MCNC: 3 MG/DL — LOW (ref 7–23)
BURR CELLS BLD QL SMEAR: PRESENT — SIGNIFICANT CHANGE UP
CALCIUM SERPL-MCNC: 8.1 MG/DL — LOW (ref 8.4–10.5)
CHLORIDE SERPL-SCNC: 107 MMOL/L — SIGNIFICANT CHANGE UP (ref 98–107)
CO2 SERPL-SCNC: 22 MMOL/L — SIGNIFICANT CHANGE UP (ref 22–31)
CREAT SERPL-MCNC: 0.5 MG/DL — SIGNIFICANT CHANGE UP (ref 0.5–1.3)
CRP SERPL-MCNC: 64.1 MG/L — HIGH
EGFR: 138 ML/MIN/1.73M2 — SIGNIFICANT CHANGE UP
EOSINOPHIL # BLD AUTO: 0 K/UL — SIGNIFICANT CHANGE UP (ref 0–0.5)
EOSINOPHIL NFR BLD AUTO: 0 % — SIGNIFICANT CHANGE UP (ref 0–6)
EPEC DNA STL QL NAA+PROBE: DETECTED
ERYTHROCYTE [SEDIMENTATION RATE] IN BLOOD: 48 MM/HR — HIGH (ref 4–25)
FERRITIN SERPL-MCNC: 167 NG/ML — HIGH (ref 15–150)
FIBRINOGEN PPP-MCNC: 474 MG/DL — HIGH (ref 200–465)
GI PCR PANEL: DETECTED
GIANT PLATELETS BLD QL SMEAR: PRESENT — SIGNIFICANT CHANGE UP
GLUCOSE SERPL-MCNC: 109 MG/DL — HIGH (ref 70–99)
HCT VFR BLD CALC: 24.5 % — LOW (ref 34.5–45)
HGB BLD-MCNC: 7.5 G/DL — LOW (ref 11.5–15.5)
HYPOCHROMIA BLD QL: SLIGHT — SIGNIFICANT CHANGE UP
IANC: 1.67 K/UL — LOW (ref 1.8–7.4)
LYMPHOCYTES # BLD AUTO: 0.77 K/UL — LOW (ref 1–3.3)
LYMPHOCYTES # BLD AUTO: 27.8 % — SIGNIFICANT CHANGE UP (ref 13–44)
MAGNESIUM SERPL-MCNC: 1.9 MG/DL — SIGNIFICANT CHANGE UP (ref 1.6–2.6)
MCHC RBC-ENTMCNC: 23.7 PG — LOW (ref 27–34)
MCHC RBC-ENTMCNC: 30.6 GM/DL — LOW (ref 32–36)
MCV RBC AUTO: 77.3 FL — LOW (ref 80–100)
MICROCYTES BLD QL: SLIGHT — SIGNIFICANT CHANGE UP
MONOCYTES # BLD AUTO: 0.14 K/UL — SIGNIFICANT CHANGE UP (ref 0–0.9)
MONOCYTES NFR BLD AUTO: 5.2 % — SIGNIFICANT CHANGE UP (ref 2–14)
MYELOCYTES NFR BLD: 1.8 % — HIGH (ref 0–0)
NEUTROPHILS # BLD AUTO: 1.81 K/UL — SIGNIFICANT CHANGE UP (ref 1.8–7.4)
NEUTROPHILS NFR BLD AUTO: 65.2 % — SIGNIFICANT CHANGE UP (ref 43–77)
NRBC # BLD: 1 /100 — HIGH (ref 0–0)
PHOSPHATE SERPL-MCNC: 3 MG/DL — SIGNIFICANT CHANGE UP (ref 2.5–4.5)
PLAT MORPH BLD: ABNORMAL
PLATELET # BLD AUTO: 127 K/UL — LOW (ref 150–400)
PLATELET COUNT - ESTIMATE: ABNORMAL
POIKILOCYTOSIS BLD QL AUTO: SIGNIFICANT CHANGE UP
POLYCHROMASIA BLD QL SMEAR: SLIGHT — SIGNIFICANT CHANGE UP
POTASSIUM SERPL-MCNC: 3.3 MMOL/L — LOW (ref 3.5–5.3)
POTASSIUM SERPL-SCNC: 3.3 MMOL/L — LOW (ref 3.5–5.3)
PROT SERPL-MCNC: 5.8 G/DL — LOW (ref 6–8.3)
RBC # BLD: 3.17 M/UL — LOW (ref 3.8–5.2)
RBC # FLD: 16.5 % — HIGH (ref 10.3–14.5)
RBC BLD AUTO: ABNORMAL
SMUDGE CELLS # BLD: PRESENT — SIGNIFICANT CHANGE UP
SODIUM SERPL-SCNC: 139 MMOL/L — SIGNIFICANT CHANGE UP (ref 135–145)
STREP DNASE B TITR SER: <78 U/ML — SIGNIFICANT CHANGE UP (ref 0–120)
VZV IGM SER-ACNC: <0.91 INDEX — SIGNIFICANT CHANGE UP (ref 0–0.9)
WBC # BLD: 2.78 K/UL — LOW (ref 3.8–10.5)
WBC # FLD AUTO: 2.78 K/UL — LOW (ref 3.8–10.5)

## 2023-02-24 PROCEDURE — 99232 SBSQ HOSP IP/OBS MODERATE 35: CPT | Mod: GC

## 2023-02-24 PROCEDURE — 71045 X-RAY EXAM CHEST 1 VIEW: CPT | Mod: 26

## 2023-02-24 PROCEDURE — 99233 SBSQ HOSP IP/OBS HIGH 50: CPT | Mod: GC

## 2023-02-24 PROCEDURE — 99232 SBSQ HOSP IP/OBS MODERATE 35: CPT

## 2023-02-24 RX ORDER — FUROSEMIDE 40 MG
10 TABLET ORAL ONCE
Refills: 0 | Status: DISCONTINUED | OUTPATIENT
Start: 2023-02-24 | End: 2023-02-24

## 2023-02-24 RX ORDER — DASATINIB 80 MG/1
0 TABLET ORAL
Qty: 0 | Refills: 0 | DISCHARGE

## 2023-02-24 RX ORDER — DIPHENHYDRAMINE HCL 50 MG
1 CAPSULE ORAL
Qty: 30 | Refills: 0
Start: 2023-02-24 | End: 2023-03-25

## 2023-02-24 RX ORDER — CIPROFLOXACIN LACTATE 400MG/40ML
1 VIAL (ML) INTRAVENOUS
Qty: 10 | Refills: 0
Start: 2023-02-24 | End: 2023-02-28

## 2023-02-24 RX ORDER — FUROSEMIDE 40 MG
10 TABLET ORAL ONCE
Refills: 0 | Status: COMPLETED | OUTPATIENT
Start: 2023-02-24 | End: 2023-02-24

## 2023-02-24 RX ORDER — LORATADINE 10 MG/1
1 TABLET ORAL
Qty: 30 | Refills: 0
Start: 2023-02-24 | End: 2023-03-25

## 2023-02-24 RX ADMIN — Medication 1 APPLICATION(S): at 21:51

## 2023-02-24 RX ADMIN — PIPERACILLIN AND TAZOBACTAM 100 MILLIGRAM(S): 4; .5 INJECTION, POWDER, LYOPHILIZED, FOR SOLUTION INTRAVENOUS at 03:00

## 2023-02-24 RX ADMIN — PIPERACILLIN AND TAZOBACTAM 100 MILLIGRAM(S): 4; .5 INJECTION, POWDER, LYOPHILIZED, FOR SOLUTION INTRAVENOUS at 21:50

## 2023-02-24 RX ADMIN — SODIUM CHLORIDE 50 MILLILITER(S): 9 INJECTION, SOLUTION INTRAVENOUS at 19:40

## 2023-02-24 RX ADMIN — Medication 2 MILLIGRAM(S): at 16:40

## 2023-02-24 RX ADMIN — PIPERACILLIN AND TAZOBACTAM 100 MILLIGRAM(S): 4; .5 INJECTION, POWDER, LYOPHILIZED, FOR SOLUTION INTRAVENOUS at 15:36

## 2023-02-24 RX ADMIN — Medication 25 MILLIGRAM(S): at 21:55

## 2023-02-24 RX ADMIN — SODIUM CHLORIDE 100 MILLILITER(S): 9 INJECTION, SOLUTION INTRAVENOUS at 07:23

## 2023-02-24 RX ADMIN — PIPERACILLIN AND TAZOBACTAM 100 MILLIGRAM(S): 4; .5 INJECTION, POWDER, LYOPHILIZED, FOR SOLUTION INTRAVENOUS at 09:40

## 2023-02-24 RX ADMIN — SODIUM CHLORIDE 50 MILLILITER(S): 9 INJECTION, SOLUTION INTRAVENOUS at 11:09

## 2023-02-24 RX ADMIN — Medication 1 APPLICATION(S): at 09:40

## 2023-02-24 RX ADMIN — LORATADINE 10 MILLIGRAM(S): 10 TABLET ORAL at 09:40

## 2023-02-24 NOTE — PROGRESS NOTE PEDS - SUBJECTIVE AND OBJECTIVE BOX
PEDIATRIC GENERAL SURGERY PROGRESS NOTE    Fever due to unspecified condition        ANGEL REYES  |  [id number]        S: Patient seen and examined at bedside.    O:  PHYSICAL EXAM:  GENERAL: NAD, resting in bed  HEENT: NC/AT  CHEST/LUNG: Breathing even, unlabored  ABDOMEN: Soft, nondistended, nontender  EXTREMITIES: FROM  NEURO:  No focal deficits     Vital Signs Last 24 Hrs  T(C): 36.9 (23 Feb 2023 21:12), Max: 37.3 (23 Feb 2023 01:24)  T(F): 98.4 (23 Feb 2023 21:12), Max: 99.1 (23 Feb 2023 01:24)  HR: 99 (23 Feb 2023 21:12) (93 - 110)  BP: 118/81 (23 Feb 2023 21:12) (94/58 - 118/81)  BP(mean): 88 (23 Feb 2023 10:56) (88 - 88)  RR: 18 (23 Feb 2023 21:12) (18 - 24)  SpO2: 95% (23 Feb 2023 21:12) (95% - 98%)    Parameters below as of 23 Feb 2023 21:12  Patient On (Oxygen Delivery Method): room air                              7.9    4.21  )-----------( 112      ( 23 Feb 2023 07:41 )             26.0     02-22    136  |  106  |  6<L>  ----------------------------<  102<H>  3.5   |  20<L>  |  0.46<L>    Ca    7.9<L>      22 Feb 2023 11:02  Phos  1.7     02-22  Mg     1.90     02-22    TPro  6.4  /  Alb  3.4  /  TBili  0.4  /  DBili  x   /  AST  13  /  ALT  14  /  AlkPhos  61  02-22 02-22-23 @ 07:01  -  02-23-23 @ 07:00  --------------------------------------------------------  IN: 4153.3 mL / OUT: 300 mL / NET: 3853.3 mL    02-23-23 @ 07:01  -  02-24-23 @ 00:55  --------------------------------------------------------  IN: 1272 mL / OUT: 1275 mL / NET: -3 mL       PEDIATRIC GENERAL SURGERY PROGRESS NOTE    Fever due to unspecified condition        ANGEL REYES  |  [id number]        S: Patient seen and examined at bedside.    O:  PHYSICAL EXAM:  GENERAL: NAD, resting in bed  HEENT: NC/AT  CHEST/LUNG: Breathing even, unlabored  ABDOMEN: Soft, nondistended, nontender  EXTREMITIES: FROM  NEURO:  No focal deficits     Vital Signs Last 24 Hrs  Vital Signs Last 24 Hrs  T(C): 37.2 (24 Feb 2023 06:00), Max: 37.4 (24 Feb 2023 01:30)  T(F): 98.9 (24 Feb 2023 06:00), Max: 99.3 (24 Feb 2023 01:30)  HR: 88 (24 Feb 2023 06:00) (88 - 101)  BP: 112/74 (24 Feb 2023 06:00) (104/70 - 118/81)  BP(mean): 88 (23 Feb 2023 10:56) (88 - 88)  RR: 20 (24 Feb 2023 06:00) (18 - 24)  SpO2: 97% (24 Feb 2023 06:00) (93% - 98%)    Parameters below as of 24 Feb 2023 06:00  Patient On (Oxygen Delivery Method): room air                              7.9    4.21  )-----------( 112      ( 23 Feb 2023 07:41 )             26.0     02-22    136  |  106  |  6<L>  ----------------------------<  102<H>  3.5   |  20<L>  |  0.46<L>    Ca    7.9<L>      22 Feb 2023 11:02  Phos  1.7     02-22  Mg     1.90     02-22    TPro  6.4  /  Alb  3.4  /  TBili  0.4  /  DBili  x   /  AST  13  /  ALT  14  /  AlkPhos  61  02-22 02-22-23 @ 07:01  -  02-23-23 @ 07:00  --------------------------------------------------------  IN: 4153.3 mL / OUT: 300 mL / NET: 3853.3 mL    02-23-23 @ 07:01  -  02-24-23 @ 00:55  --------------------------------------------------------  IN: 1272 mL / OUT: 1275 mL / NET: -3 mL

## 2023-02-24 NOTE — PROGRESS NOTE PEDS - NS ATTEST RISK PROBLEM GEN_ALL_CORE FT
CML patient with fever, painful rash and colitis, requiring antibiotic and monitoring.
CML patient with fever, painful rash and colitis, requiring antibiotic and monitoring.

## 2023-02-24 NOTE — PROGRESS NOTE PEDS - SUBJECTIVE AND OBJECTIVE BOX
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 CXR demonstrated chaz pulmonary edema, pt otherwise comfortable and returned to sleep. This morning, patient states that her rash feels less painful. Denied any abd pain. Stated that she continues to have a dry cough and prefers staying upright.     Vital Signs Last 24 Hrs  T(C): 37.2 (24 Feb 2023 06:00), Max: 37.4 (24 Feb 2023 01:30)  T(F): 98.9 (24 Feb 2023 06:00), Max: 99.3 (24 Feb 2023 01:30)  HR: 88 (24 Feb 2023 06:00) (88 - 101)  BP: 112/74 (24 Feb 2023 06:00) (104/70 - 118/81)  BP(mean): 88 (23 Feb 2023 10:56) (88 - 88)  RR: 20 (24 Feb 2023 06:00) (18 - 24)  SpO2: 97% (24 Feb 2023 06:00) (93% - 98%)    Parameters below as of 24 Feb 2023 06:00  Patient On (Oxygen Delivery Method): room air        PHYSICAL EXAM  General: sitting upright, well appearing, no apparent distress  HENT: moist mucous membranes, no mouth sores or mucosal bleeding, no conjunctival injection, neck supple, no masses  Cardio: tachycardic, regular rhythm, normal S1, S2, no murmurs, rubs or gallops, cap refill < 2 seconds  Respiratory: small dry cough, decreased airflow in chaz lower lobes, no increased work of breathing  Abdomen: soft, nondistended, normoactive bowel sounds, no hepatosplenomegaly, no masses,  Lymphadenopathy: no adenopathy appreciated  Skin: mildly tender edematous erythematous plaques scattered throughout lower trunk and pelvic girdle. Marked decrease in size of the rash from the day before.   Neuro: no focal neurological deficits noted      CYTOPENIAS                        7.9    4.21  )-----------( 112      ( 23 Feb 2023 07:41 )             26.0                         8.0    6.29  )-----------( 102      ( 22 Feb 2023 11:02 )             26.3     Auto Neutrophil %: 53.2 % (02-23-23 @ 07:41)  Auto Lymphocyte %: 34.2 % (02-23-23 @ 07:41)  Auto Monocyte %: 11.4 % (02-23-23 @ 07:41)  Auto Immature Granulocyte %: 0.5 % (02-23-23 @ 07:41)  Auto Neutrophil #: 2.24 K/uL (02-23-23 @ 07:41)      INFECTIOUS RISK AND COMPLICATIONS  Central Line: none    Active infections:  Fever overnight? [] yes [x] no  Antimicrobials:  piperacillin/tazobactam IV Intermittent - Peds 3000 milliGRAM(s) IV Intermittent every 6 hours      Isolation:    Cultures:   Culture Results:   <10,000 CFU/mL Normal Urogenital Ana Maria (02-21 @ 12:45)  Culture Results:   No growth to date. (02-21 @ 10:30)      NUTRITIONAL DEFICIENCIES  Weight: Weight (kg): 63.8    I&Os:   02-23 @ 07:01  -  02-24 @ 07:00  --------------------------------------------------------  IN: 2472 mL / OUT: 1275 mL / NET: 1197 mL        02-23 @ 07:01  -  02-24 @ 07:00  --------------------------------------------------------  IN:    dextrose 5% + sodium chloride 0.9% - Pediatric: 2100 mL    IV PiggyBack: 372 mL  Total IN: 2472 mL    OUT:    Incontinent per Diaper, Weight (mL): 825 mL    Voided (mL): 450 mL  Total OUT: 1275 mL    Total NET: 1197 mL          22 Feb 2023 11:02    136    |  106    |  6      ----------------------------<  102    3.5     |  20     |  0.46     Ca    7.9        22 Feb 2023 11:02  Phos  1.7       22 Feb 2023 11:02  Mg     1.90      22 Feb 2023 11:02    TPro  6.4    /  Alb  3.4    /  TBili  0.4    /  DBili  x      /  AST  13     /  ALT  14     /  AlkPhos  61     / Amylase x      /Lipase x      22 Feb 2023 11:02        IV Fluids: dextrose 5% + sodium chloride 0.9%. - Pediatric milliLiter(s) IV Continuous    TPN:  Glycemic Control:     acetaminophen   Oral Tab/Cap - Peds. 650 milliGRAM(s) Oral every 6 hours PRN  dextrose 5% + sodium chloride 0.9%. - Pediatric 1000 milliLiter(s) IV Continuous <Continuous>      PAIN MANAGEMENT  acetaminophen   Oral Tab/Cap - Peds. 650 milliGRAM(s) Oral every 6 hours PRN      Pain score: 5/10 along rash    OTHER PROBLEMS  Hypertension? yes [] no[x]  Antihypertensives:     Premorbid conditions:     No Known Allergies      Other issues:    clobetasol 0.05% Topical Cream - Peds 1 Application(s) Topical two times a day  diphenhydrAMINE   Oral Tab/Cap - Peds 25 milliGRAM(s) Oral at bedtime  loratadine  Oral Tab/Cap - Peds 10 milliGRAM(s) Oral daily      PATIENT CARE ACCESS  [x] Peripheral IV  [] Central Venous Line	[] R	[] L	[] IJ	[] Fem	[] SC			[] Placed:  [] PICC:				[] Broviac		[] Mediport  [] Urinary Catheter, Date Placed:  [] Necessity of urinary, arterial, and venous catheters discussed    RADIOLOGY RESULTS:  ACC: [id number] EXAM:  XR CHEST PORTABLE URGENT 1V   ORDERED BY: ROSALINDA CHINO     PROCEDURE DATE:  02/24/2023    ******PRELIMINARY REPORT******      ******PRELIMINARY REPORT******           INTERPRETATION:  pulm edema    PROCEDURE:  CT of the Abdomen and Pelvis was performed.  Sagittal and coronal reformats were performed.    FINDINGS:  LOWER CHEST: Subsegmental atelectasis and small right pleural effusion.    LIVER: There is no mass. Periportal edema is noted.  BILE DUCTS: Normal caliber.  GALLBLADDER: Pericholecystic fluid. The gallbladder is otherwise normal   in appearance.  SPLEEN: Within normal limits.  PANCREAS: Within normal limits.  ADRENALS: Within normal limits.  KIDNEYS/URETERS: Within normal limits.    BLADDER: Within normal limits.  REPRODUCTIVE ORGANS: Uterus and adnexa within normal limits.    BOWEL: No bowel obstruction. The cecum and ascending colon is markedly   thickened and there is small amount of surrounding free fluid. The small   bowel is normal in appearance. PERITONEUM: Moderate pelvic ascites.  VESSELS: Within normal limits.  RETROPERITONEUM/LYMPH NODES: No lymphadenopathy.  ABDOMINAL WALL: Soft tissue edema.  BONES: Within normal limits.    IMPRESSION:  Typhlitis

## 2023-02-24 NOTE — PROGRESS NOTE PEDS - ASSESSMENT
ASSESSMENT/PLAN:  #Edematous erythematous plaques - improved, now with Post inflammatory hyperpigmentation. Given patient's recent sick contact and symptoms (cough, SOB, abdominal pain) and Ecoli+, favor a reactive process. Although patient denies starting any new medications other than Motrin, serum sickness like reaction can also be considered. Sweet's syndrome is another possibility or even less likely, leukemia cutis but unlikely as her concomitant CML is well-controlled and the morphology is typically different from this patient's  - will follow results of skin biopsy for H&E performed on 02/23 on right flank. If patient is discharged, will call her with the results and follow-up plan based on results. Can continue the below as well on discharge.  - Can c/w Claritin 10 mg PO daily and Benadryl 25mg PO qHs  - For the affected areas on the body: c/w clobetasol 0.05% cream BID PRN symptomatic areas for up to 2 weeks on / 1 week off. Repeat cycles as necessary.     Recommendations were communicated with the primary team.  The patient's chart was reviewed in addition to being examined at bedside with the dermatology attending. Discussed plans with the dermatology attending, Dr. Pisano  Dermatology will sign off. Thank you for consulting our service.    Samira Hess MD MPH  Resident Physician, PGY3  [facility]  Office: [phone]  Pager: [phone]  **Please page with 10-DIGIT callback # for further related questions.**

## 2023-02-24 NOTE — DIETITIAN INITIAL EVALUATION PEDIATRIC - PERTINENT PMH/PSH
MEDICATIONS  (STANDING):  clobetasol 0.05% Topical Cream - Peds 1 Application(s) Topical two times a day  dextrose 5% + sodium chloride 0.9%. - Pediatric 1000 milliLiter(s) (50 mL/Hr) IV Continuous <Continuous>  diphenhydrAMINE   Oral Tab/Cap - Peds 25 milliGRAM(s) Oral at bedtime  furosemide  IV Intermittent - Peds 10 milliGRAM(s) IV Intermittent once  loratadine  Oral Tab/Cap - Peds 10 milliGRAM(s) Oral daily  piperacillin/tazobactam IV Intermittent - Peds 3000 milliGRAM(s) IV Intermittent every 6 hours    MEDICATIONS  (PRN):  acetaminophen   Oral Tab/Cap - Peds. 650 milliGRAM(s) Oral every 6 hours PRN Moderate Pain (4 - 6)

## 2023-02-24 NOTE — DIETITIAN INITIAL EVALUATION PEDIATRIC - NUTRITIONGOAL OUTCOME1
Patient to meet >75% estimated needs, tolerating well.     RD to monitor and remain available. - Paulina Hawk MS RD, pager #[phone]

## 2023-02-24 NOTE — PROGRESS NOTE PEDS - ATTENDING COMMENTS
Denies abdominal pain.  Has had loose stools.  Abdomen is soft nontender on exam.  Enteropathic E Coli detected in stool.  This may be the etiology of the inflammatory changes seen on the CT scan.  I do not know if antibiotic treatment for the E Coli is indicated, nor do I know if Zosyn is the correct antibiotic to use.  I am not opposed to advancing her diet at this time.
In brief, Sachin is a 17 years old female with CML chronic phase on Dasatinib who is admitted in the setting of 4 days of fever with persistent tachycardia. Reported to have sick contact at home last week and complains of painful raised rash on bilateral hips and abdomen, headache, light-headedness and URI symptoms.      She received 2 NS bolus and remains persistent tachycardic. Blood culture NGTD. Overnight she desat to low 90% with more coughing, CXR obtained that showed bilateral pulmonary edema, likely secondary to multiple fluid upon admission, will administer lasix one dose for better diuresis.     Her US abdomen showed possible thickening of intestine and CT abdomen was obtained that showed colitis. Her rash is prominent for raised confluent erythema rash and tender to palpate on bilateral hip area extend to upper thighs, 2x circular raised erythema rash and flat erythematous plagues on chest area. Raised the suspicious of erythema nodosum vs viral exanthema vs drug allergy vs leukemic infiltrate(given the history of CML). Dermatology is involved and punch biopsy was performed, and recommend clobetasol topical. The rash today has significantly improved either from discontinuing of Dasatinib, antibiotic treatment or topical steroid.     GI PCR sent that came back positive EPEC. Her lab significant for elevated ESR/CRP/ferritin indicates inflammatory state but improving daily. Other infectious lab came back within normal limit. Beside the possibility of Dasatinib induced colitis, presence of infectious process might also cause colitis picture. Given that she is immunosuppressed from the long term Dasatinib usage, it might worth to continue with PO antibiotic treatment to help with improvement of her Colitis.     She received IV venofer once given the iron deficiency anemia state. Her hemoglobin continues to drift down but she is also having heavy menses. Will hold off blood transfusion given patient is asymptomatic. Continue other supportive care and IV antibiotic while she is inpatient.     Plan discussed with Onc fellow/PA/NP, residents, nursing, and pharmacist.
In brief, Sachin is a 17 years old female with CML chronic phase on Dasatinib who is admitted in the setting of 4 days of fever with persistent tachycardia. Reported to have sick contact at home last week and complains of painful raised rash on bilateral hips and abdomen, headache, light-headedness and URI symptoms.      She received 2 NS bolus and remains persistent tachycardic. Blood culture NGTD. Overnight no acute event and VSS. Her US abdomen showed possible thickening of intestine and CT abdomen was obtained that showed colitis. Surgery was consulted and recommended NPO with antibiotic. Her rash is prominent for raised confluent erythema rash and tender to palpate on bilateral hip area extend to upper thighs, 2x circular raised erythema rash and flat erythematous plagues on chest area. Raised the suspicious of erythema nodosum vs viral exanthema vs drug allergy vs leukemic infiltrate(given the history of CML).     Dermatology is consulted for possible skin biopsy. Will also consult GI with the presentation of EN + colitis with family history of GI disorder. Her lab significant for elevated ESR/CRP/ferritin indicates inflammatory state. Most infectious lab came back within normal limit. Will give her IV venofer once given the iron deficiency anemia state. Continue Zosyn for anaerobe coverage and hydration.     Plan discussed with Onc fellow/PA/NP, residents, nursing, and pharmacist.

## 2023-02-24 NOTE — PROGRESS NOTE PEDS - SUBJECTIVE AND OBJECTIVE BOX
INTERVAL HPI/OVERNIGHT EVENTS:  S: Patient is a 17y old  Female who presents with a chief complaint of Fever (24 Feb 2023 07:38)  Patient tested +Ecoli. Skin symptoms improved, no longer painful and less edematous.    MEDICATIONS  (STANDING):  clobetasol 0.05% Topical Cream - Peds 1 Application(s) Topical two times a day  dextrose 5% + sodium chloride 0.9%. - Pediatric 1000 milliLiter(s) (50 mL/Hr) IV Continuous <Continuous>  diphenhydrAMINE   Oral Tab/Cap - Peds 25 milliGRAM(s) Oral at bedtime  furosemide  IV Intermittent - Peds 10 milliGRAM(s) IV Intermittent once  loratadine  Oral Tab/Cap - Peds 10 milliGRAM(s) Oral daily  piperacillin/tazobactam IV Intermittent - Peds 3000 milliGRAM(s) IV Intermittent every 6 hours    MEDICATIONS  (PRN):  acetaminophen   Oral Tab/Cap - Peds. 650 milliGRAM(s) Oral every 6 hours PRN Moderate Pain (4 - 6)      Allergies    No Known Allergies    Intolerances        REVIEW OF SYSTEMS  General: no fevers/chills, no NS	  Skin: see HPI  Ophthalmologic: no eye pain or change in vision  Genitourinary: no dysuria or hematuria  Musculoskeletal: no joint pains or weakness	  Neurological: no weakness or tingling      Vital Signs Last 24 Hrs  T(C): 36.7 (24 Feb 2023 14:52), Max: 37.4 (24 Feb 2023 01:30)  T(F): 98 (24 Feb 2023 14:52), Max: 99.3 (24 Feb 2023 01:30)  HR: 89 (24 Feb 2023 14:52) (88 - 99)  BP: 115/81 (24 Feb 2023 14:52) (104/70 - 118/81)  BP(mean): --  RR: 20 (24 Feb 2023 14:52) (18 - 20)  SpO2: 99% (24 Feb 2023 14:52) (93% - 100%)    Parameters below as of 24 Feb 2023 14:52  Patient On (Oxygen Delivery Method): room air        PHYSICAL EXAM:  The patient was alert and oriented X 3, well nourished, and in no apparent distress.  There was no visible lymphadenopathy.  Conjunctiva were non injected  There was no clubbing or edema of extremities.    Of note on skin exam:  - brown patches in areas that were previously erythematous    LABS:                        7.5    2.78  )-----------( 127      ( 24 Feb 2023 06:28 )             24.5     02-24    139  |  107  |  3<L>  ----------------------------<  109<H>  3.3<L>   |  22  |  0.50    Ca    8.1<L>      24 Feb 2023 06:28  Phos  3.0     02-24  Mg     1.90     02-24    TPro  5.8<L>  /  Alb  3.1<L>  /  TBili  0.4  /  DBili  x   /  AST  14  /  ALT  14  /  AlkPhos  59  02-24          RADIOLOGY & ADDITIONAL TESTS:

## 2023-02-24 NOTE — DIETITIAN INITIAL EVALUATION PEDIATRIC - OTHER INFO
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 ED workup for fevers demonstrated neg RVP, neg UCx, neg Chest x-ray. Regarding the abdominal pain, the patient was worked up with a US that was concerning for typhlitis. Per GI: abd pain is most likely acute infectious given associated fever/rash. Possible medication induced as dasatinib induced colitis is a well described entity. Much less likely IBD given acuity of symptoms, self resolving abdominal pain, no diarrhea or blood in stool. Per MD notes.    Patient visited at bedside, participating in interview, mother present as well.     Diet advanced from clear liquids to regular this AM. Patient endorses good appetite. Patient is vegan (preference) no other dietary restrictions per mom and patient. Current diet order is regular, patient provided with vegan menus for tonight and tomorrow.  Likes all fruits and vegetables, likes almond milk / soy milk, likes pasta, likes veggie burgers (does not like spicy foods).    No questions or concerns at this time.     +BM 2/23. No emesis. No edema. R hip biopsy site, rash abdomen back, skin otherwise intact.    Weights:  1/14/22: 59.8 kg  4/1/22: 61.2 kg  1/11: 62.1 kg  2/22: 63.8 kg

## 2023-02-24 NOTE — PROGRESS NOTE PEDS - ASSESSMENT
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 ED workup for fevers demonstrated neg RVP, neg UCx, neg Chest x-ray; pt began Ceftriaxone and has since been afebrile. Regarding the abdominal pain, the patient was worked up with a US that was concerning for typhlitis; CT scan preliminary report suggested possible ischemic colitis however the final CT read was consistent with typhlitis. Pt has been NPO since 2/22 on maintenance fluid with abx coverage of Zosyn. Surgery was consulted regarding possible ischemic colitis and suggested NPO and continued monitoring with abd exams. Per GI: abd pain is most likely acute infectious given associated fever/rash. Possible medication induced as dasatinib induced colitis is a well described entity. Much less likely IBD given acuity of symptoms, self resolving abdominal pain, no diarrhea or blood in stool.  Regarding the rash - patient has been on Benedry and Claritin; began clobetasol with improvement in rash size on 2/24. Biopsy performed on 2/23 with follow up scheduled w/ derm.     Plan:    #Fever   - Zosyn q6, switch on night of 2/22 due to concern for GI pathology   - Completed CTX (2/21-2/22)  - RVP neg, Chest x-ray neg, Viral work-up Neg, Strep Neg  - Tylenol PRN   - UCx NG   - Follow up BCx   - Afebrile for the last ~48hr, send BCx if pt becomes febrile   - mycoplasm labs follow up     #CML   - Continue to hold Dasatinib     #Abd pain   - CT imagining concerning for typhlitis   - Pt made NPO on 2/22, IV maintenance fluid  - Surgery consulted due to initial concern for ischemic colitis; no acute intervention required   - Consulted GI; low suspicion for IBD, more likely colitis d/t acute infection vs. medication    - GI PCR + for EPEC  - Trend CRP, ESR, fibrinogen, ferritin as part of inflammatory marker review     #Rash   - Continues to have edematous plaques, dermatology consulted   - recommended Benadryl, Claritin and clobetasol (For the affected areas on the body: start clobetasol 0.05% cream BID for up to 2 weeks on / 1 week off. 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 ED workup for fevers demonstrated neg RVP, neg UCx, neg Chest x-ray; pt began Ceftriaxone and has since been afebrile. Regarding the abdominal pain, the patient was worked up with a US that was concerning for typhlitis; CT scan preliminary report suggested possible ischemic colitis however the final CT read was consistent with typhlitis. Pt has been NPO since 2/22 on maintenance fluid with abx coverage of Zosyn. Surgery was consulted regarding possible ischemic colitis and suggested NPO and continued monitoring with abd exams. Per GI: abd pain is most likely acute infectious given associated fever/rash. Possible medication induced as dasatinib induced colitis is a well described entity. Much less likely IBD given acuity of symptoms, self resolving abdominal pain, no diarrhea or blood in stool.  Regarding the rash - patient has been on Benadryl and Claritin; began clobetasol with improvement in rash size on 2/24. Biopsy performed on 2/23 with follow up scheduled w/ derm.     Plan:    #Fever   - Zosyn q6, switch on night of 2/22 due to concern for GI pathology   - Completed CTX (2/21-2/22)  - RVP neg, Chest x-ray neg, Viral work-up Neg, Strep Neg  - Tylenol PRN   - UCx NG   - Follow up BCx   - Afebrile for the last ~48hr, send BCx if pt becomes febrile   - mycoplasm labs follow up     #CML   - Continue to hold Dasatinib     #Abd pain   - CT imagining concerning for typhlitis   - Pt made NPO on 2/22, IV maintenance fluid  - Surgery consulted due to initial concern for ischemic colitis; no acute intervention required   - Consulted GI; low suspicion for IBD, more likely colitis d/t acute infection vs. medication    - GI PCR + for EPEC  - Trend CRP, ESR, fibrinogen, ferritin as part of inflammatory marker review     #Rash   - Continues to have edematous plaques, dermatology consulted   - recommended Benadryl, Claritin and clobetasol (For the affected areas on the body: start clobetasol 0.05% cream BID for up to 2 weeks on / 1 week off. Repeat cycles as necessary)   - Biopsy performed on 2/23; follow-up results     #Anemia   - last Hgb 7.9, total iron found to be 14; Repleted with IV iron single dose on 2/23  - Morning CBC, morning CMP     #Respiratory   - RA  - Incentive spirometer  - Cxr demonstrates chaz pulm edema     #FENGI   - PO fluids (clears)

## 2023-02-24 NOTE — DIETITIAN INITIAL EVALUATION PEDIATRIC - NS AS NUTRI INTERV MEALS SNACK
1. Regular vegan diet. 2. Obtain preferences. 3. Monitor PO intake and tolerance, GI, weights, labs, lytes./General/healthful diet

## 2023-02-24 NOTE — PROGRESS NOTE PEDS - ASSESSMENT
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 Patient endorsed abdominal pain in setting of current period and constipation. US abdomen negative for ovarian/appendix pathology however did show some thickened cecum with trace fluid possible typhlitis. Surgery consulted for CT A/P findings of thickened bowel from cecum to splenic flexure, distribution that could be possible for ischemic colitis. White count 6.2 with no left shift however unreliable in setting of CML on Dasatinib.    Plan:  - Low concern for ischemic colitis at this time due to benign physical exam  - CLD  - IV Zosyn  - rest of care per primary      Pediatric Surgery  [id number]

## 2023-02-25 ENCOUNTER — TRANSCRIPTION ENCOUNTER (OUTPATIENT)
Age: 18
End: 2023-02-25

## 2023-02-25 VITALS
DIASTOLIC BLOOD PRESSURE: 82 MMHG | OXYGEN SATURATION: 100 % | TEMPERATURE: 99 F | HEART RATE: 88 BPM | SYSTOLIC BLOOD PRESSURE: 116 MMHG | RESPIRATION RATE: 20 BRPM

## 2023-02-25 LAB
BASOPHILS # BLD AUTO: 0.02 K/UL — SIGNIFICANT CHANGE UP (ref 0–0.2)
BASOPHILS NFR BLD AUTO: 0.7 % — SIGNIFICANT CHANGE UP (ref 0–2)
CRP SERPL-MCNC: 45.5 MG/L — HIGH
EOSINOPHIL # BLD AUTO: 0.12 K/UL — SIGNIFICANT CHANGE UP (ref 0–0.5)
EOSINOPHIL NFR BLD AUTO: 3.9 % — SIGNIFICANT CHANGE UP (ref 0–6)
HCT VFR BLD CALC: 26.7 % — LOW (ref 34.5–45)
HGB BLD-MCNC: 7.9 G/DL — LOW (ref 11.5–15.5)
IANC: 1.17 K/UL — LOW (ref 1.8–7.4)
IMM GRANULOCYTES NFR BLD AUTO: 2 % — HIGH (ref 0–0.9)
LYMPHOCYTES # BLD AUTO: 1.34 K/UL — SIGNIFICANT CHANGE UP (ref 1–3.3)
LYMPHOCYTES # BLD AUTO: 44.1 % — HIGH (ref 13–44)
MCHC RBC-ENTMCNC: 23.7 PG — LOW (ref 27–34)
MCHC RBC-ENTMCNC: 29.6 GM/DL — LOW (ref 32–36)
MCV RBC AUTO: 79.9 FL — LOW (ref 80–100)
MONOCYTES # BLD AUTO: 0.33 K/UL — SIGNIFICANT CHANGE UP (ref 0–0.9)
MONOCYTES NFR BLD AUTO: 10.9 % — SIGNIFICANT CHANGE UP (ref 2–14)
NEUTROPHILS # BLD AUTO: 1.17 K/UL — LOW (ref 1.8–7.4)
NEUTROPHILS NFR BLD AUTO: 38.4 % — LOW (ref 43–77)
NRBC # BLD: 0 /100 WBCS — SIGNIFICANT CHANGE UP (ref 0–0)
NRBC # FLD: 0 K/UL — SIGNIFICANT CHANGE UP (ref 0–0)
PLATELET # BLD AUTO: 158 K/UL — SIGNIFICANT CHANGE UP (ref 150–400)
RBC # BLD: 3.34 M/UL — LOW (ref 3.8–5.2)
RBC # FLD: 16.8 % — HIGH (ref 10.3–14.5)
WBC # BLD: 3.04 K/UL — LOW (ref 3.8–10.5)
WBC # FLD AUTO: 3.04 K/UL — LOW (ref 3.8–10.5)

## 2023-02-25 PROCEDURE — 99238 HOSP IP/OBS DSCHRG MGMT 30/<: CPT | Mod: GC

## 2023-02-25 RX ORDER — DIPHENHYDRAMINE HCL 50 MG
1 CAPSULE ORAL
Qty: 30 | Refills: 0
Start: 2023-02-25 | End: 2023-03-26

## 2023-02-25 RX ORDER — CIPROFLOXACIN LACTATE 400MG/40ML
1 VIAL (ML) INTRAVENOUS
Qty: 10 | Refills: 0
Start: 2023-02-25 | End: 2023-03-01

## 2023-02-25 RX ADMIN — LORATADINE 10 MILLIGRAM(S): 10 TABLET ORAL at 10:33

## 2023-02-25 RX ADMIN — Medication 1 APPLICATION(S): at 10:33

## 2023-02-25 RX ADMIN — PIPERACILLIN AND TAZOBACTAM 100 MILLIGRAM(S): 4; .5 INJECTION, POWDER, LYOPHILIZED, FOR SOLUTION INTRAVENOUS at 09:07

## 2023-02-25 RX ADMIN — SODIUM CHLORIDE 50 MILLILITER(S): 9 INJECTION, SOLUTION INTRAVENOUS at 07:35

## 2023-02-25 RX ADMIN — PIPERACILLIN AND TAZOBACTAM 100 MILLIGRAM(S): 4; .5 INJECTION, POWDER, LYOPHILIZED, FOR SOLUTION INTRAVENOUS at 02:52

## 2023-02-25 NOTE — DISCHARGE NOTE NURSING/CASE MANAGEMENT/SOCIAL WORK - PATIENT PORTAL LINK FT
You can access the FollowMyHealth Patient Portal offered by [facility] by registering at the following website: [email or web address]. By joining [facility]’s FollowMyHealth portal, you will also be able to view your health information using other applications (apps) compatible with our system.

## 2023-02-25 NOTE — DISCHARGE NOTE NURSING/CASE MANAGEMENT/SOCIAL WORK - NSDCPNINST_GEN_ALL_CORE
Call MD if Sachin develops a fever above 100.4 F,  has an increase in cough or congestion, decreased appetite, decreased urine output or for any other questions or concerns regarding recent hospitalization.

## 2023-02-25 NOTE — DISCHARGE NOTE NURSING/CASE MANAGEMENT/SOCIAL WORK - NSDCPEFALRISK_GEN_ALL_CORE
For information on Fall & Injury Prevention, visit: [email or web address] OR  [email or web address] OR  https://www.cdc.gov/steadi/patient.html 85

## 2023-02-26 LAB
CULTURE RESULTS: SIGNIFICANT CHANGE UP
SPECIMEN SOURCE: SIGNIFICANT CHANGE UP

## 2023-02-27 LAB — CALPROTECTIN STL-MCNT: 197 UG/G — HIGH (ref 0–120)

## 2023-02-28 ENCOUNTER — APPOINTMENT (OUTPATIENT)
Dept: ULTRASOUND IMAGING | Facility: CLINIC | Age: 18
End: 2023-02-28
Payer: COMMERCIAL

## 2023-02-28 ENCOUNTER — OUTPATIENT (OUTPATIENT)
Dept: OUTPATIENT SERVICES | Facility: HOSPITAL | Age: 18
LOS: 1 days | End: 2023-02-28

## 2023-02-28 DIAGNOSIS — K52.9 NONINFECTIVE GASTROENTERITIS AND COLITIS, UNSPECIFIED: ICD-10-CM

## 2023-02-28 DIAGNOSIS — Q69.9 POLYDACTYLY, UNSPECIFIED: Chronic | ICD-10-CM

## 2023-02-28 PROCEDURE — 76700 US EXAM ABDOM COMPLETE: CPT | Mod: 26

## 2023-03-01 ENCOUNTER — APPOINTMENT (OUTPATIENT)
Dept: RADIOLOGY | Facility: HOSPITAL | Age: 18
End: 2023-03-01

## 2023-03-01 ENCOUNTER — APPOINTMENT (OUTPATIENT)
Dept: PEDIATRIC HEMATOLOGY/ONCOLOGY | Facility: CLINIC | Age: 18
End: 2023-03-01
Payer: COMMERCIAL

## 2023-03-01 ENCOUNTER — RESULT REVIEW (OUTPATIENT)
Age: 18
End: 2023-03-01

## 2023-03-01 ENCOUNTER — OUTPATIENT (OUTPATIENT)
Dept: OUTPATIENT SERVICES | Age: 18
LOS: 1 days | Discharge: HOME | End: 2023-03-01

## 2023-03-01 ENCOUNTER — OUTPATIENT (OUTPATIENT)
Dept: OUTPATIENT SERVICES | Facility: HOSPITAL | Age: 18
LOS: 1 days | End: 2023-03-01
Payer: COMMERCIAL

## 2023-03-01 VITALS
HEART RATE: 74 BPM | TEMPERATURE: 97.7 F | OXYGEN SATURATION: 99 % | BODY MASS INDEX: 24.54 KG/M2 | SYSTOLIC BLOOD PRESSURE: 130 MMHG | RESPIRATION RATE: 20 BRPM | DIASTOLIC BLOOD PRESSURE: 88 MMHG | WEIGHT: 141.98 LBS | HEIGHT: 63.78 IN

## 2023-03-01 DIAGNOSIS — Q69.9 POLYDACTYLY, UNSPECIFIED: Chronic | ICD-10-CM

## 2023-03-01 DIAGNOSIS — C92.10 CHRONIC MYELOID LEUKEMIA, BCR/ABL-POSITIVE, NOT HAVING ACHIEVED REMISSION: ICD-10-CM

## 2023-03-01 DIAGNOSIS — Z91.14 PATIENT'S OTHER NONCOMPLIANCE WITH MEDICATION REGIMEN: ICD-10-CM

## 2023-03-01 LAB
ALBUMIN SERPL ELPH-MCNC: 3.8 G/DL — SIGNIFICANT CHANGE UP (ref 3.3–5)
ALP SERPL-CCNC: 66 U/L — SIGNIFICANT CHANGE UP (ref 40–120)
ALT FLD-CCNC: 9 U/L — SIGNIFICANT CHANGE UP (ref 4–33)
ANION GAP SERPL CALC-SCNC: 11 MMOL/L — SIGNIFICANT CHANGE UP (ref 7–14)
AST SERPL-CCNC: 17 U/L — SIGNIFICANT CHANGE UP (ref 4–32)
BASOPHILS # BLD AUTO: 0.02 K/UL — SIGNIFICANT CHANGE UP (ref 0–0.2)
BASOPHILS NFR BLD AUTO: 0.6 % — SIGNIFICANT CHANGE UP (ref 0–2)
BILIRUB SERPL-MCNC: 0.7 MG/DL — SIGNIFICANT CHANGE UP (ref 0.2–1.2)
BUN SERPL-MCNC: 5 MG/DL — LOW (ref 7–23)
CALCIUM SERPL-MCNC: 9.3 MG/DL — SIGNIFICANT CHANGE UP (ref 8.4–10.5)
CHLORIDE SERPL-SCNC: 104 MMOL/L — SIGNIFICANT CHANGE UP (ref 98–107)
CO2 SERPL-SCNC: 24 MMOL/L — SIGNIFICANT CHANGE UP (ref 22–31)
CREAT SERPL-MCNC: 0.49 MG/DL — LOW (ref 0.5–1.3)
CRP SERPL-MCNC: 11.5 MG/L — HIGH
EGFR: 139 ML/MIN/1.73M2 — SIGNIFICANT CHANGE UP
EOSINOPHIL # BLD AUTO: 0.11 K/UL — SIGNIFICANT CHANGE UP (ref 0–0.5)
EOSINOPHIL NFR BLD AUTO: 3.3 % — SIGNIFICANT CHANGE UP (ref 0–6)
ERYTHROCYTE [SEDIMENTATION RATE] IN BLOOD: 35 MM/HR — HIGH (ref 4–25)
FERRITIN SERPL-MCNC: 143 NG/ML — SIGNIFICANT CHANGE UP (ref 15–150)
GLUCOSE SERPL-MCNC: 95 MG/DL — SIGNIFICANT CHANGE UP (ref 70–99)
HCT VFR BLD CALC: 33.7 % — LOW (ref 34.5–45)
HGB BLD-MCNC: 10.3 G/DL — LOW (ref 11.5–15.5)
IANC: 1.18 K/UL — LOW (ref 1.8–7.4)
IMM GRANULOCYTES NFR BLD AUTO: 1.2 % — HIGH (ref 0–0.9)
IRON SATN MFR SERPL: 14 % — SIGNIFICANT CHANGE UP (ref 14–50)
IRON SATN MFR SERPL: 44 UG/DL — SIGNIFICANT CHANGE UP (ref 30–160)
LYMPHOCYTES # BLD AUTO: 1.65 K/UL — SIGNIFICANT CHANGE UP (ref 1–3.3)
LYMPHOCYTES # BLD AUTO: 50.2 % — HIGH (ref 13–44)
MCHC RBC-ENTMCNC: 24.2 PG — LOW (ref 27–34)
MCHC RBC-ENTMCNC: 30.6 GM/DL — LOW (ref 32–36)
MCV RBC AUTO: 79.3 FL — LOW (ref 80–100)
MONOCYTES # BLD AUTO: 0.29 K/UL — SIGNIFICANT CHANGE UP (ref 0–0.9)
MONOCYTES NFR BLD AUTO: 8.8 % — SIGNIFICANT CHANGE UP (ref 2–14)
NEUTROPHILS # BLD AUTO: 1.18 K/UL — LOW (ref 1.8–7.4)
NEUTROPHILS NFR BLD AUTO: 35.9 % — LOW (ref 43–77)
NRBC # BLD: 0 /100 WBCS — SIGNIFICANT CHANGE UP (ref 0–0)
PLATELET # BLD AUTO: 231 K/UL — SIGNIFICANT CHANGE UP (ref 150–400)
POTASSIUM SERPL-MCNC: 3.9 MMOL/L — SIGNIFICANT CHANGE UP (ref 3.5–5.3)
POTASSIUM SERPL-SCNC: 3.9 MMOL/L — SIGNIFICANT CHANGE UP (ref 3.5–5.3)
PROT SERPL-MCNC: 7.4 G/DL — SIGNIFICANT CHANGE UP (ref 6–8.3)
RBC # BLD: 4.25 M/UL — SIGNIFICANT CHANGE UP (ref 3.8–5.2)
RBC # BLD: 4.25 M/UL — SIGNIFICANT CHANGE UP (ref 3.8–5.2)
RBC # FLD: 18.8 % — HIGH (ref 10.3–14.5)
RETICS #: 256.3 K/UL — HIGH (ref 25–125)
RETICS/RBC NFR: 6 % — HIGH (ref 0.5–2.5)
SODIUM SERPL-SCNC: 139 MMOL/L — SIGNIFICANT CHANGE UP (ref 135–145)
TIBC SERPL-MCNC: 318 UG/DL — SIGNIFICANT CHANGE UP (ref 220–430)
UIBC SERPL-MCNC: 274 UG/DL — SIGNIFICANT CHANGE UP (ref 110–370)
WBC # BLD: 3.29 K/UL — LOW (ref 3.8–10.5)
WBC # FLD AUTO: 3.29 K/UL — LOW (ref 3.8–10.5)

## 2023-03-01 PROCEDURE — 99214 OFFICE O/P EST MOD 30 MIN: CPT

## 2023-03-01 PROCEDURE — 71046 X-RAY EXAM CHEST 2 VIEWS: CPT | Mod: 26

## 2023-03-02 DIAGNOSIS — D50.8 OTHER IRON DEFICIENCY ANEMIAS: ICD-10-CM

## 2023-03-02 DIAGNOSIS — C92.10 CHRONIC MYELOID LEUKEMIA, BCR/ABL-POSITIVE, NOT HAVING ACHIEVED REMISSION: ICD-10-CM

## 2023-03-03 LAB
HSV1 AB FLD QL: NEGATIVE — SIGNIFICANT CHANGE UP
HSV2 AB FLD-ACNC: NEGATIVE — SIGNIFICANT CHANGE UP
M PNEUMO IGM SER-ACNC: 0.93 INDEX — HIGH (ref 0–0.9)
MYCOPLASMA AG SPEC QL: ABNORMAL

## 2023-03-06 PROBLEM — Z91.14 HISTORY OF MEDICATION NONCOMPLIANCE: Status: RESOLVED | Noted: 2022-02-18 | Resolved: 2023-03-06

## 2023-03-06 RX ORDER — LORATADINE 10 MG/1
10 TABLET ORAL
Qty: 30 | Refills: 0 | Status: DISCONTINUED | COMMUNITY
Start: 2023-02-24

## 2023-03-06 RX ORDER — CLOBETASOL PROPIONATE 0.5 MG/G
0.05 CREAM TOPICAL
Qty: 30 | Refills: 0 | Status: DISCONTINUED | COMMUNITY
Start: 2023-02-24

## 2023-03-06 RX ORDER — CLOBETASOL PROPIONATE 0.5 MG/G
0.05 CREAM TOPICAL
Qty: 30 | Refills: 0 | Status: DISCONTINUED | COMMUNITY
Start: 2023-02-25

## 2023-03-06 RX ORDER — DIPHENHYDRAMINE HYDROCHLORIDE 25 MG/1
25 CAPSULE ORAL
Qty: 30 | Refills: 0 | Status: DISCONTINUED | COMMUNITY
Start: 2023-02-24

## 2023-03-06 RX ORDER — CIPROFLOXACIN HYDROCHLORIDE 500 MG/1
500 TABLET, FILM COATED ORAL
Qty: 10 | Refills: 0 | Status: DISCONTINUED | COMMUNITY
Start: 2023-02-25

## 2023-03-06 RX ORDER — LEVOFLOXACIN 500 MG/1
500 TABLET, FILM COATED ORAL
Qty: 1 | Refills: 0 | Status: DISCONTINUED | COMMUNITY
Start: 2023-02-21

## 2023-03-07 NOTE — HISTORY OF PRESENT ILLNESS
[No Feeding Issues] : no feeding issues at this time [de-identified] : Sachin is a 17 year old girl who follows in our clinic for chronic myeloid leukemia and chemotherapy management.\par She was diagnosed in December 2019, treated with Dasatinib for one year but had a suboptimal response at 12 months (BM BCR ABL RT PCR 0.214%).\par She was then enrolled on [id number] - a Phase 1/2 trial for Bosutinib and began treatment on 3/5/21. She opted out of the study on 6/25/21 owing to GI side effects.\par She then restarted treatment with Dasatinib in August 2021 with dose reduction (80% dose)\par \par DISEASE SUMMARY:\par DIAGNOSIS: Chronic Myeloid Leukemia\par DIAGNOSED: 12/20/2019\par PHASE AT DIAGNOSIS: Chronic phase, 0.5% blasts in peripheral blood and bone marrow\par HEMPATH: Hypercellularity, hypo ablated nucleus containing megakaryocytes, increased M:E ratio of 4:1, 1% blasts present\par PERIPHERAL BLOOD BCR-ABL qRT PCR AT DIAGNOSIS: > 10% on International Scale\par \par DISEASE SURVEILLANCE:\par ON DASATINIB:\par 12/21 - Chronic phase CML diagnosed, 0.5% blasts, PB qRT PCR > 10% on IS\par 1 MONTH POST DIAGNOSIS - PB RT-PCR > 10% (57%)\par 3 MONTH POST DIAGNOSIS - PB RT-PCR 0.73%, BM RT-PCR 1.015%\par 6 MONTH POST DIAGNOSIS - PB RT-PCR 1.060%\par 12 MONTH POST DIAGNOSIS BONE MARROW RT -PCR 0.214% (Suboptimal/warning). Continues to have dose dependent hematologic toxicity. \par \par ON BOSUTINIB:\par 02/2021 - Enrolled on [id number] Phase 1/2 Bosutinib study due to suboptimal response and intolerance to Dasatinib, started treatment 3/5/21, phase 1 400mg/m2\par 03/2021 - Dose reduced Bosutinib due to AE, 350mg/m2 (restarted 3/25/21) \par 5/11/2021- Dose reduced bosutinib due to AEs, 300mg/m2\par Bone marrow testing after cycle 3 of bosutinib showed PCR of 0.2%. \par 6/25/21 - Opted out of study and discontinue Bosutinib due to GI toxicity\par \par RESTARTED DASATINIB at 80 mg daily in August 2021\par Jan 2022 - Bone marrow BCR ABL 1.1 %, increased from previously, due to non compliance\par Feb 2022 - Peripheral Blood BCR ABL 0.06%, now compliant. \par June 2022 - Peripheral blood BCR ABL 0.013%\par \par March 2023 - developed colitis and bilateral pleural effusions in the setting of an acute EPEC infection. Given the possiblity of Dasatinib induced colitis and pleural effusion - dose interrupted for one week. Restarted at lower dose of 50 mg daily. [de-identified] : Sachin is here for follow up of her CML\par She was recently admitted to the hospital when she presented with fever, abdominal pain, rash and was found to have colitis\par She was found to have an acute EPEC infection and was on antibiotics. At the same time - during the admission - she had bilateral moderate pleural effusion needing loop diuretics. Dasatinib was held and she was discharged when resolution of fever and rash and abdominal pain\par Now at home - she is doing much better. Easier to breathe and no longer having loss of appetite\par Otherwise no concern

## 2023-03-07 NOTE — CONSULT LETTER
[Dear  ___] : Dear  [unfilled], [Courtesy Letter:] : I had the pleasure of seeing your patient, [unfilled], in my office today. [Please see my note below.] : Please see my note below. [Consult Closing:] : Thank you very much for allowing me to participate in the care of this patient.  If you have any questions, please do not hesitate to contact me. [Sincerely,] : Sincerely, [FreeTextEntry2] : Meagan Fu MD\par [address]\[place] [address] [FreeTextEntry3] : RAGHU Dumas\par Attending Physician, Pediatric Hematology/Oncology\par [facility]\par , [facility] [employer]\par Email: [email or web address]\par

## 2023-03-07 NOTE — REVIEW OF SYSTEMS
[Negative] : Allergic/Immunologic [Immunizations are up to date by report] : Immunizations are up to date by report [de-identified] : no rash  [FreeTextEntry1] : CML on therapy [FreeTextEntry8] : see history

## 2023-03-10 LAB — SURGICAL PATHOLOGY STUDY: SIGNIFICANT CHANGE UP

## 2023-03-15 ENCOUNTER — APPOINTMENT (OUTPATIENT)
Dept: PEDIATRIC HEMATOLOGY/ONCOLOGY | Facility: CLINIC | Age: 18
End: 2023-03-15

## 2023-03-17 ENCOUNTER — RESULT REVIEW (OUTPATIENT)
Age: 18
End: 2023-03-17

## 2023-03-17 ENCOUNTER — APPOINTMENT (OUTPATIENT)
Dept: PEDIATRIC HEMATOLOGY/ONCOLOGY | Facility: CLINIC | Age: 18
End: 2023-03-17
Payer: COMMERCIAL

## 2023-03-17 ENCOUNTER — APPOINTMENT (OUTPATIENT)
Dept: RADIOLOGY | Facility: HOSPITAL | Age: 18
End: 2023-03-17

## 2023-03-17 ENCOUNTER — OUTPATIENT (OUTPATIENT)
Dept: OUTPATIENT SERVICES | Facility: HOSPITAL | Age: 18
LOS: 1 days | End: 2023-03-17
Payer: COMMERCIAL

## 2023-03-17 VITALS
BODY MASS INDEX: 24.03 KG/M2 | RESPIRATION RATE: 22 BRPM | DIASTOLIC BLOOD PRESSURE: 75 MMHG | HEIGHT: 63.46 IN | SYSTOLIC BLOOD PRESSURE: 107 MMHG | OXYGEN SATURATION: 100 % | HEART RATE: 98 BPM | WEIGHT: 137.35 LBS | TEMPERATURE: 98.24 F

## 2023-03-17 DIAGNOSIS — J90 PLEURAL EFFUSION, NOT ELSEWHERE CLASSIFIED: ICD-10-CM

## 2023-03-17 DIAGNOSIS — Q69.9 POLYDACTYLY, UNSPECIFIED: Chronic | ICD-10-CM

## 2023-03-17 LAB
ALBUMIN SERPL ELPH-MCNC: 4.6 G/DL — SIGNIFICANT CHANGE UP (ref 3.3–5)
ALP SERPL-CCNC: 64 U/L — SIGNIFICANT CHANGE UP (ref 40–120)
ALT FLD-CCNC: 10 U/L — SIGNIFICANT CHANGE UP (ref 4–33)
ANION GAP SERPL CALC-SCNC: 13 MMOL/L — SIGNIFICANT CHANGE UP (ref 7–14)
AST SERPL-CCNC: 18 U/L — SIGNIFICANT CHANGE UP (ref 4–32)
BASOPHILS # BLD AUTO: 0.03 K/UL — SIGNIFICANT CHANGE UP (ref 0–0.2)
BASOPHILS NFR BLD AUTO: 0.6 % — SIGNIFICANT CHANGE UP (ref 0–2)
BILIRUB SERPL-MCNC: 1.2 MG/DL — SIGNIFICANT CHANGE UP (ref 0.2–1.2)
BUN SERPL-MCNC: 10 MG/DL — SIGNIFICANT CHANGE UP (ref 7–23)
CALCIUM SERPL-MCNC: 9.5 MG/DL — SIGNIFICANT CHANGE UP (ref 8.4–10.5)
CHLORIDE SERPL-SCNC: 104 MMOL/L — SIGNIFICANT CHANGE UP (ref 98–107)
CO2 SERPL-SCNC: 24 MMOL/L — SIGNIFICANT CHANGE UP (ref 22–31)
CREAT SERPL-MCNC: 0.53 MG/DL — SIGNIFICANT CHANGE UP (ref 0.5–1.3)
CRP SERPL-MCNC: <3 MG/L — SIGNIFICANT CHANGE UP
EGFR: 137 ML/MIN/1.73M2 — SIGNIFICANT CHANGE UP
EOSINOPHIL # BLD AUTO: 0.11 K/UL — SIGNIFICANT CHANGE UP (ref 0–0.5)
EOSINOPHIL NFR BLD AUTO: 2.2 % — SIGNIFICANT CHANGE UP (ref 0–6)
ERYTHROCYTE [SEDIMENTATION RATE] IN BLOOD: 9 MM/HR — SIGNIFICANT CHANGE UP (ref 4–25)
FERRITIN SERPL-MCNC: 36 NG/ML — SIGNIFICANT CHANGE UP (ref 15–150)
GLUCOSE SERPL-MCNC: 60 MG/DL — LOW (ref 70–99)
HCT VFR BLD CALC: 37 % — SIGNIFICANT CHANGE UP (ref 34.5–45)
HGB BLD-MCNC: 11.6 G/DL — SIGNIFICANT CHANGE UP (ref 11.5–15.5)
IANC: 1.93 K/UL — SIGNIFICANT CHANGE UP (ref 1.8–7.4)
IMM GRANULOCYTES NFR BLD AUTO: 0.2 % — SIGNIFICANT CHANGE UP (ref 0–0.9)
LYMPHOCYTES # BLD AUTO: 2.46 K/UL — SIGNIFICANT CHANGE UP (ref 1–3.3)
LYMPHOCYTES # BLD AUTO: 49.1 % — HIGH (ref 13–44)
MCHC RBC-ENTMCNC: 25.2 PG — LOW (ref 27–34)
MCHC RBC-ENTMCNC: 31.4 GM/DL — LOW (ref 32–36)
MCV RBC AUTO: 80.3 FL — SIGNIFICANT CHANGE UP (ref 80–100)
MONOCYTES # BLD AUTO: 0.47 K/UL — SIGNIFICANT CHANGE UP (ref 0–0.9)
MONOCYTES NFR BLD AUTO: 9.4 % — SIGNIFICANT CHANGE UP (ref 2–14)
NEUTROPHILS # BLD AUTO: 1.93 K/UL — SIGNIFICANT CHANGE UP (ref 1.8–7.4)
NEUTROPHILS NFR BLD AUTO: 38.5 % — LOW (ref 43–77)
NRBC # BLD: 0 /100 WBCS — SIGNIFICANT CHANGE UP (ref 0–0)
PLATELET # BLD AUTO: 92 K/UL — LOW (ref 150–400)
POTASSIUM SERPL-MCNC: 4.1 MMOL/L — SIGNIFICANT CHANGE UP (ref 3.5–5.3)
POTASSIUM SERPL-SCNC: 4.1 MMOL/L — SIGNIFICANT CHANGE UP (ref 3.5–5.3)
PROT SERPL-MCNC: 7.2 G/DL — SIGNIFICANT CHANGE UP (ref 6–8.3)
RBC # BLD: 4.61 M/UL — SIGNIFICANT CHANGE UP (ref 3.8–5.2)
RBC # BLD: 4.61 M/UL — SIGNIFICANT CHANGE UP (ref 3.8–5.2)
RBC # FLD: 18.8 % — HIGH (ref 10.3–14.5)
RETICS #: 66.8 K/UL — SIGNIFICANT CHANGE UP (ref 25–125)
RETICS/RBC NFR: 1.5 % — SIGNIFICANT CHANGE UP (ref 0.5–2.5)
SODIUM SERPL-SCNC: 141 MMOL/L — SIGNIFICANT CHANGE UP (ref 135–145)
WBC # BLD: 5.01 K/UL — SIGNIFICANT CHANGE UP (ref 3.8–10.5)
WBC # FLD AUTO: 5.01 K/UL — SIGNIFICANT CHANGE UP (ref 3.8–10.5)

## 2023-03-17 PROCEDURE — 71046 X-RAY EXAM CHEST 2 VIEWS: CPT | Mod: 26

## 2023-03-17 PROCEDURE — 99214 OFFICE O/P EST MOD 30 MIN: CPT

## 2023-03-20 RX ORDER — DASATINIB 80 MG/1
80 TABLET ORAL
Qty: 60 | Refills: 4 | Status: DISCONTINUED | COMMUNITY
Start: 2021-07-15 | End: 2023-03-20

## 2023-03-20 NOTE — PHYSICAL EXAM
[5] : was Francisco stage 5 [Francisco Stage ___] : the Francisco stage for breast development was [unfilled] [Normal] : PERRL, extraocular movements intact, cranial nerves II-XII grossly intact [100: Normal, no complaints, no evidence of disease.] : 100: Normal, no complaints, no evidence of disease.

## 2023-03-20 NOTE — CONSULT LETTER
[Dear  ___] : Dear  [unfilled], [Courtesy Letter:] : I had the pleasure of seeing your patient, [unfilled], in my office today. [Please see my note below.] : Please see my note below. [Consult Closing:] : Thank you very much for allowing me to participate in the care of this patient.  If you have any questions, please do not hesitate to contact me. [Sincerely,] : Sincerely, [FreeTextEntry2] : Meagan Fu MD\par [address]\[place] [address] [FreeTextEntry3] : RAGHU Dumas\par Attending Physician, Pediatric Hematology/Oncology\par [facility]\par , [facility] [employer]\par Email: [email or web address]\par

## 2023-03-20 NOTE — REVIEW OF SYSTEMS
[Negative] : Allergic/Immunologic [de-identified] : no rash  [FreeTextEntry1] : CML on therapy [FreeTextEntry8] : see history  [Immunizations are up to date by report] : Immunizations are up to date by report

## 2023-03-20 NOTE — HISTORY OF PRESENT ILLNESS
[de-identified] : Sachin is a 17 year old girl who follows in our clinic for chronic myeloid leukemia and chemotherapy management.\par She was diagnosed in December 2019, treated with Dasatinib for one year but had a suboptimal response at 12 months (BM BCR ABL RT PCR 0.214%).\par She was then enrolled on [id number] - a Phase 1/2 trial for Bosutinib and began treatment on 3/5/21. She opted out of the study on 6/25/21 owing to GI side effects.\par She then restarted treatment with Dasatinib in August 2021 with dose reduction (80% dose)\par \par DISEASE SUMMARY:\par DIAGNOSIS: Chronic Myeloid Leukemia\par DIAGNOSED: 12/20/2019\par PHASE AT DIAGNOSIS: Chronic phase, 0.5% blasts in peripheral blood and bone marrow\par HEMPATH: Hypercellularity, hypo ablated nucleus containing megakaryocytes, increased M:E ratio of 4:1, 1% blasts present\par PERIPHERAL BLOOD BCR-ABL qRT PCR AT DIAGNOSIS: > 10% on International Scale\par \par DISEASE SURVEILLANCE:\par ON DASATINIB:\par 12/21 - Chronic phase CML diagnosed, 0.5% blasts, PB qRT PCR > 10% on IS\par 1 MONTH POST DIAGNOSIS - PB RT-PCR > 10% (57%)\par 3 MONTH POST DIAGNOSIS - PB RT-PCR 0.73%, BM RT-PCR 1.015%\par 6 MONTH POST DIAGNOSIS - PB RT-PCR 1.060%\par 12 MONTH POST DIAGNOSIS BONE MARROW RT -PCR 0.214% (Suboptimal/warning). Continues to have dose dependent hematologic toxicity. \par \par ON BOSUTINIB:\par 02/2021 - Enrolled on [id number] Phase 1/2 Bosutinib study due to suboptimal response and intolerance to Dasatinib, started treatment 3/5/21, phase 1 400mg/m2\par 03/2021 - Dose reduced Bosutinib due to AE, 350mg/m2 (restarted 3/25/21) \par 5/11/2021- Dose reduced bosutinib due to AEs, 300mg/m2\par Bone marrow testing after cycle 3 of bosutinib showed PCR of 0.2%. \par 6/25/21 - Opted out of study and discontinue Bosutinib due to GI toxicity\par \par RESTARTED DASATINIB at 80 mg daily in August 2021\par Jan 2022 - Bone marrow BCR ABL 1.1 %, increased from previously, due to non compliance\par Feb 2022 - Peripheral Blood BCR ABL 0.06%, now compliant. \par June 2022 - Peripheral blood BCR ABL 0.013%\par \par March 2023 - developed colitis and bilateral pleural effusions in the setting of an acute EPEC infection. Given the possiblity of Dasatinib induced colitis and pleural effusion - dose interrupted for one week. Restarted at lower dose of 50 mg daily. [de-identified] : Sachin is here for follow up of her CML\par She was recently admitted to the hospital when she presented with fever, abdominal pain, rash and was found to have colitis\par Now at home - she is doing much better. Easier to breathe and no longer having loss of appetite\par Otherwise no concern [No Feeding Issues] : no feeding issues at this time

## 2023-03-30 NOTE — HISTORY OF PRESENT ILLNESS
Patient rounded on per unit protocol, family remained at patient bedside throughout the night. Family requested for unit OC to come to bed side at 0555, Sharmila unit OC/Charge at bedside by 0600, spoke with family, and answered questions.    [No Feeding Issues] : no feeding issues at this time [de-identified] : Sachin is a 14 year old girl who follow in our clinic for chronic myeloid leukemia\par She was diagnosed after she presented with 1-3 week history of back pain, headaches and fatigue. A CBC showed a WBC of 162,000\par \par DISEASE SUMMARY:\par DIAGNOSIS: Chronic Myeloid Leukemia\par DIAGNOSED: 12/20/2019\par PHASE AT DIAGNOSIS: Chronic phase, 0.5% blasts in peripheral blood and bone marrow\par HEMPATH: Hypercellularity, hypo ablated nucleus containing megakaryocytes, increased M:E ratio of 4:1, 1% blasts present\par FISH/CYTOGENETICS: BCR-ABL positive\par PERIPHERAL BLOOD BCR-ABL qRT PCR AT DIAGNOSIS: > 10% on International Scale\par \par DISEASE SURVEILLANCE:\par 12/21 - Chronic phase CML diagnosed, 0.5% blasts, PB qRT PCR > 10% on IS\par 1 MONTH POST DIAGNOSIS - PB RT-PCR > 10% (57%)\par 2 MONTH POST DIAGNOSIS - PB RT-PCR 6.2%\par 3 MONTH POST DIAGNOSIS - PB RT-PCR 0.73%, BM RT-PCR 1.015%\par 4 MONTH POST DIAGNOSIS - PB RT-PCR 1.044%\par 6 MONTH POST DIAGNOSIS - PB RT-PCR 1.060%\par 7 MONTH POST DIAGNOSIS - PB RT-PCR 0.348%\par 8 MONTH POST DIAGNOSIS - PB RT-PCR at 0.101%\par \par TIMELINE:\par 12/21/2019 - Chronic phase, no evidence of blastic phase, started Dasatinib\par 1/10 - normalization of WBC but no CHR (anemia with Hb at 9.6 and splenomegaly)\par 1/17 - splenomegaly resolved but persistent anemia/thrombocytopenia\par 2/14/20 - down trending BCR-ABL RT PCR, but no CHR yet\par 3/24 - 3 months post diagnosis, no CHR yet\par 5/29 - 5 month post diagnosis, continued panyctopenia with declining PB RT-PCR, most likely Dasatinib induced myelosuppression, reduced dose\par 6/19/20 - counts recovered, most likely the counts were low secondary to Dasatinib [de-identified] : Sachin is doing well\par No complaints or concerns\par Taking her Dasatinib regularly - 70 mg\par No missed doses

## 2023-03-30 NOTE — CHART NOTE - NSCHARTNOTEFT_GEN_A_CORE
I personally called the patient to discuss with the mother the pathology results. She expressed understanding. The rash is now resolved.

## 2023-04-03 ENCOUNTER — OUTPATIENT (OUTPATIENT)
Dept: OUTPATIENT SERVICES | Age: 18
LOS: 1 days | Discharge: HOME | End: 2023-04-03

## 2023-04-03 DIAGNOSIS — Q69.9 POLYDACTYLY, UNSPECIFIED: Chronic | ICD-10-CM

## 2023-04-04 ENCOUNTER — RESULT REVIEW (OUTPATIENT)
Age: 18
End: 2023-04-04

## 2023-04-04 ENCOUNTER — APPOINTMENT (OUTPATIENT)
Dept: PEDIATRIC HEMATOLOGY/ONCOLOGY | Facility: CLINIC | Age: 18
End: 2023-04-04
Payer: COMMERCIAL

## 2023-04-04 VITALS
HEIGHT: 63.58 IN | SYSTOLIC BLOOD PRESSURE: 105 MMHG | DIASTOLIC BLOOD PRESSURE: 72 MMHG | OXYGEN SATURATION: 100 % | HEART RATE: 98 BPM | TEMPERATURE: 98.24 F | RESPIRATION RATE: 20 BRPM

## 2023-04-04 LAB
ALBUMIN SERPL ELPH-MCNC: 4.8 G/DL — SIGNIFICANT CHANGE UP (ref 3.3–5)
ALP SERPL-CCNC: 70 U/L — SIGNIFICANT CHANGE UP (ref 40–120)
ALT FLD-CCNC: 14 U/L — SIGNIFICANT CHANGE UP (ref 4–33)
AMYLASE P1 CFR SERPL: 78 U/L — SIGNIFICANT CHANGE UP (ref 25–125)
ANION GAP SERPL CALC-SCNC: 11 MMOL/L — SIGNIFICANT CHANGE UP (ref 7–14)
APTT 50/50 2HOUR INCUB: SIGNIFICANT CHANGE UP SEC (ref 27.5–37.4)
APTT BLD: 33 SEC — SIGNIFICANT CHANGE UP (ref 27–36.3)
APTT BLD: SIGNIFICANT CHANGE UP SEC (ref 27.5–37.4)
AST SERPL-CCNC: 22 U/L — SIGNIFICANT CHANGE UP (ref 4–32)
BASOPHILS # BLD AUTO: 0.03 K/UL — SIGNIFICANT CHANGE UP (ref 0–0.2)
BASOPHILS NFR BLD AUTO: 0.9 % — SIGNIFICANT CHANGE UP (ref 0–2)
BILIRUB DIRECT SERPL-MCNC: <0.2 MG/DL — SIGNIFICANT CHANGE UP (ref 0–0.3)
BILIRUB SERPL-MCNC: 0.7 MG/DL — SIGNIFICANT CHANGE UP (ref 0.2–1.2)
BUN SERPL-MCNC: 9 MG/DL — SIGNIFICANT CHANGE UP (ref 7–23)
CALCIUM SERPL-MCNC: 9.6 MG/DL — SIGNIFICANT CHANGE UP (ref 8.4–10.5)
CHLORIDE SERPL-SCNC: 103 MMOL/L — SIGNIFICANT CHANGE UP (ref 98–107)
CO2 SERPL-SCNC: 25 MMOL/L — SIGNIFICANT CHANGE UP (ref 22–31)
CREAT SERPL-MCNC: 0.59 MG/DL — SIGNIFICANT CHANGE UP (ref 0.5–1.3)
CRP SERPL-MCNC: <3 MG/L — SIGNIFICANT CHANGE UP
EGFR: 133 ML/MIN/1.73M2 — SIGNIFICANT CHANGE UP
EOSINOPHIL # BLD AUTO: 0.07 K/UL — SIGNIFICANT CHANGE UP (ref 0–0.5)
EOSINOPHIL NFR BLD AUTO: 2 % — SIGNIFICANT CHANGE UP (ref 0–6)
ERYTHROCYTE [SEDIMENTATION RATE] IN BLOOD: 13 MM/HR — SIGNIFICANT CHANGE UP (ref 4–25)
FACT VIII ACT/NOR PPP: 83.4 % — SIGNIFICANT CHANGE UP (ref 45–125)
FACTOR VIII VON WILLEBRAND RATIO RESULT: SIGNIFICANT CHANGE UP
FERRITIN SERPL-MCNC: 17 NG/ML — SIGNIFICANT CHANGE UP (ref 15–150)
FIBRINOGEN PPP-MCNC: 255 MG/DL — SIGNIFICANT CHANGE UP (ref 200–465)
GLUCOSE SERPL-MCNC: 85 MG/DL — SIGNIFICANT CHANGE UP (ref 70–99)
HCT VFR BLD CALC: 36.9 % — SIGNIFICANT CHANGE UP (ref 34.5–45)
HGB BLD-MCNC: 11.7 G/DL — SIGNIFICANT CHANGE UP (ref 11.5–15.5)
IANC: 1.18 K/UL — LOW (ref 1.8–7.4)
IMM GRANULOCYTES NFR BLD AUTO: 0 % — SIGNIFICANT CHANGE UP (ref 0–0.9)
INR BLD: 0.89 RATIO — SIGNIFICANT CHANGE UP (ref 0.88–1.16)
IRON SATN MFR SERPL: 11 % — LOW (ref 14–50)
IRON SATN MFR SERPL: 40 UG/DL — SIGNIFICANT CHANGE UP (ref 30–160)
LIDOCAIN IGE QN: 20 U/L — SIGNIFICANT CHANGE UP (ref 7–60)
LYMPHOCYTES # BLD AUTO: 2 K/UL — SIGNIFICANT CHANGE UP (ref 1–3.3)
LYMPHOCYTES # BLD AUTO: 57 % — HIGH (ref 13–44)
MAGNESIUM SERPL-MCNC: 2 MG/DL — SIGNIFICANT CHANGE UP (ref 1.6–2.6)
MCHC RBC-ENTMCNC: 25.7 PG — LOW (ref 27–34)
MCHC RBC-ENTMCNC: 31.7 GM/DL — LOW (ref 32–36)
MCV RBC AUTO: 80.9 FL — SIGNIFICANT CHANGE UP (ref 80–100)
MONOCYTES # BLD AUTO: 0.23 K/UL — SIGNIFICANT CHANGE UP (ref 0–0.9)
MONOCYTES NFR BLD AUTO: 6.6 % — SIGNIFICANT CHANGE UP (ref 2–14)
NEUTROPHILS # BLD AUTO: 1.18 K/UL — LOW (ref 1.8–7.4)
NEUTROPHILS NFR BLD AUTO: 33.5 % — LOW (ref 43–77)
NRBC # BLD: 0 /100 WBCS — SIGNIFICANT CHANGE UP (ref 0–0)
PHOSPHATE SERPL-MCNC: 4.3 MG/DL — SIGNIFICANT CHANGE UP (ref 2.5–4.5)
PLATELET # BLD AUTO: 143 K/UL — LOW (ref 150–400)
POTASSIUM SERPL-MCNC: 4.1 MMOL/L — SIGNIFICANT CHANGE UP (ref 3.5–5.3)
POTASSIUM SERPL-SCNC: 4.1 MMOL/L — SIGNIFICANT CHANGE UP (ref 3.5–5.3)
PROT SERPL-MCNC: 7.4 G/DL — SIGNIFICANT CHANGE UP (ref 6–8.3)
PROTHROM AB SERPL-ACNC: 10.3 SEC — LOW (ref 10.5–13.4)
RBC # BLD: 4.56 M/UL — SIGNIFICANT CHANGE UP (ref 3.8–5.2)
RBC # BLD: 4.56 M/UL — SIGNIFICANT CHANGE UP (ref 3.8–5.2)
RBC # FLD: 18.3 % — HIGH (ref 10.3–14.5)
RETICS #: 55.6 K/UL — SIGNIFICANT CHANGE UP (ref 25–125)
RETICS/RBC NFR: 1.2 % — SIGNIFICANT CHANGE UP (ref 0.5–2.5)
SODIUM SERPL-SCNC: 139 MMOL/L — SIGNIFICANT CHANGE UP (ref 135–145)
TIBC SERPL-MCNC: 378 UG/DL — SIGNIFICANT CHANGE UP (ref 220–430)
UIBC SERPL-MCNC: 338 UG/DL — SIGNIFICANT CHANGE UP (ref 110–370)
VWF AG ACT/NOR PPP IA: 74 % — SIGNIFICANT CHANGE UP (ref 63–170)
VWF:RCO ACT/NOR PPP PL AGG: 66 % — SIGNIFICANT CHANGE UP (ref 43–126)
WBC # BLD: 3.51 K/UL — LOW (ref 3.8–10.5)
WBC # FLD AUTO: 3.51 K/UL — LOW (ref 3.8–10.5)

## 2023-04-04 PROCEDURE — 99214 OFFICE O/P EST MOD 30 MIN: CPT

## 2023-04-05 DIAGNOSIS — C92.10 CHRONIC MYELOID LEUKEMIA, BCR/ABL-POSITIVE, NOT HAVING ACHIEVED REMISSION: ICD-10-CM

## 2023-04-05 DIAGNOSIS — D50.8 OTHER IRON DEFICIENCY ANEMIAS: ICD-10-CM

## 2023-04-07 LAB — STFR SERPL-MCNC: 18.3 NMOL/L — SIGNIFICANT CHANGE UP (ref 12.2–27.3)

## 2023-04-25 ENCOUNTER — RESULT REVIEW (OUTPATIENT)
Age: 18
End: 2023-04-25

## 2023-04-25 ENCOUNTER — APPOINTMENT (OUTPATIENT)
Dept: PEDIATRIC HEMATOLOGY/ONCOLOGY | Facility: CLINIC | Age: 18
End: 2023-04-25
Payer: COMMERCIAL

## 2023-04-25 VITALS
WEIGHT: 138.45 LBS | HEART RATE: 85 BPM | SYSTOLIC BLOOD PRESSURE: 106 MMHG | OXYGEN SATURATION: 100 % | TEMPERATURE: 98.24 F | DIASTOLIC BLOOD PRESSURE: 73 MMHG | RESPIRATION RATE: 21 BRPM | HEIGHT: 63.66 IN | BODY MASS INDEX: 23.93 KG/M2

## 2023-04-25 LAB
ALBUMIN SERPL ELPH-MCNC: 4.5 G/DL — SIGNIFICANT CHANGE UP (ref 3.3–5)
ALP SERPL-CCNC: 59 U/L — SIGNIFICANT CHANGE UP (ref 40–120)
ALT FLD-CCNC: 16 U/L — SIGNIFICANT CHANGE UP (ref 4–33)
ANION GAP SERPL CALC-SCNC: 10 MMOL/L — SIGNIFICANT CHANGE UP (ref 7–14)
AST SERPL-CCNC: 22 U/L — SIGNIFICANT CHANGE UP (ref 4–32)
BASOPHILS # BLD AUTO: 0.05 K/UL — SIGNIFICANT CHANGE UP (ref 0–0.2)
BASOPHILS NFR BLD AUTO: 1.1 % — SIGNIFICANT CHANGE UP (ref 0–2)
BILIRUB SERPL-MCNC: 0.6 MG/DL — SIGNIFICANT CHANGE UP (ref 0.2–1.2)
BUN SERPL-MCNC: 9 MG/DL — SIGNIFICANT CHANGE UP (ref 7–23)
CALCIUM SERPL-MCNC: 9.4 MG/DL — SIGNIFICANT CHANGE UP (ref 8.4–10.5)
CHLORIDE SERPL-SCNC: 104 MMOL/L — SIGNIFICANT CHANGE UP (ref 98–107)
CO2 SERPL-SCNC: 25 MMOL/L — SIGNIFICANT CHANGE UP (ref 22–31)
CREAT SERPL-MCNC: 0.64 MG/DL — SIGNIFICANT CHANGE UP (ref 0.5–1.3)
EGFR: 130 ML/MIN/1.73M2 — SIGNIFICANT CHANGE UP
EOSINOPHIL # BLD AUTO: 0.07 K/UL — SIGNIFICANT CHANGE UP (ref 0–0.5)
EOSINOPHIL NFR BLD AUTO: 1.6 % — SIGNIFICANT CHANGE UP (ref 0–6)
FERRITIN SERPL-MCNC: 16 NG/ML — SIGNIFICANT CHANGE UP (ref 15–150)
GLUCOSE SERPL-MCNC: 86 MG/DL — SIGNIFICANT CHANGE UP (ref 70–99)
HCT VFR BLD CALC: 37.8 % — SIGNIFICANT CHANGE UP (ref 34.5–45)
HGB BLD-MCNC: 12 G/DL — SIGNIFICANT CHANGE UP (ref 11.5–15.5)
IANC: 0.98 K/UL — LOW (ref 1.8–7.4)
IMM GRANULOCYTES NFR BLD AUTO: 0.2 % — SIGNIFICANT CHANGE UP (ref 0–0.9)
IRON SATN MFR SERPL: 12 % — LOW (ref 14–50)
IRON SATN MFR SERPL: 41 UG/DL — SIGNIFICANT CHANGE UP (ref 30–160)
LDH SERPL L TO P-CCNC: 191 U/L — SIGNIFICANT CHANGE UP (ref 135–225)
LYMPHOCYTES # BLD AUTO: 3.12 K/UL — SIGNIFICANT CHANGE UP (ref 1–3.3)
LYMPHOCYTES # BLD AUTO: 70.6 % — HIGH (ref 13–44)
MAGNESIUM SERPL-MCNC: 2 MG/DL — SIGNIFICANT CHANGE UP (ref 1.6–2.6)
MCHC RBC-ENTMCNC: 26.1 PG — LOW (ref 27–34)
MCHC RBC-ENTMCNC: 31.7 GM/DL — LOW (ref 32–36)
MCV RBC AUTO: 82.2 FL — SIGNIFICANT CHANGE UP (ref 80–100)
MONOCYTES # BLD AUTO: 0.19 K/UL — SIGNIFICANT CHANGE UP (ref 0–0.9)
MONOCYTES NFR BLD AUTO: 4.3 % — SIGNIFICANT CHANGE UP (ref 2–14)
NEUTROPHILS # BLD AUTO: 0.98 K/UL — LOW (ref 1.8–7.4)
NEUTROPHILS NFR BLD AUTO: 22.2 % — LOW (ref 43–77)
NRBC # BLD: 0 /100 WBCS — SIGNIFICANT CHANGE UP (ref 0–0)
PHOSPHATE SERPL-MCNC: 3.8 MG/DL — SIGNIFICANT CHANGE UP (ref 2.5–4.5)
PLATELET # BLD AUTO: 145 K/UL — LOW (ref 150–400)
PMV BLD: 11.2 FL — SIGNIFICANT CHANGE UP (ref 7–13)
POTASSIUM SERPL-MCNC: 4 MMOL/L — SIGNIFICANT CHANGE UP (ref 3.5–5.3)
POTASSIUM SERPL-SCNC: 4 MMOL/L — SIGNIFICANT CHANGE UP (ref 3.5–5.3)
PROT SERPL-MCNC: 7.4 G/DL — SIGNIFICANT CHANGE UP (ref 6–8.3)
RBC # BLD: 4.6 M/UL — SIGNIFICANT CHANGE UP (ref 3.8–5.2)
RBC # BLD: 4.6 M/UL — SIGNIFICANT CHANGE UP (ref 3.8–5.2)
RBC # FLD: 18 % — HIGH (ref 10.3–14.5)
RETICS #: 48.8 K/UL — SIGNIFICANT CHANGE UP (ref 25–125)
RETICS/RBC NFR: 1.1 % — SIGNIFICANT CHANGE UP (ref 0.5–2.5)
SODIUM SERPL-SCNC: 139 MMOL/L — SIGNIFICANT CHANGE UP (ref 135–145)
TIBC SERPL-MCNC: 342 UG/DL — SIGNIFICANT CHANGE UP (ref 220–430)
UIBC SERPL-MCNC: 301 UG/DL — SIGNIFICANT CHANGE UP (ref 110–370)
URATE SERPL-MCNC: 3.6 MG/DL — SIGNIFICANT CHANGE UP (ref 2.5–7)
WBC # BLD: 4.42 K/UL — SIGNIFICANT CHANGE UP (ref 3.8–10.5)
WBC # FLD AUTO: 4.42 K/UL — SIGNIFICANT CHANGE UP (ref 3.8–10.5)

## 2023-04-25 PROCEDURE — 99214 OFFICE O/P EST MOD 30 MIN: CPT

## 2023-04-25 RX ORDER — IRON POLYSACCHARIDE COMPLEX 15 MG/ML
15 DROPS ORAL
Qty: 150 | Refills: 5 | Status: ACTIVE | COMMUNITY
Start: 2023-04-25

## 2023-04-25 RX ORDER — CHLORHEXIDINE GLUCONATE 4 %
325 (65 FE) LIQUID (ML) TOPICAL DAILY
Qty: 60 | Refills: 2 | Status: DISCONTINUED | COMMUNITY
Start: 2023-01-26 | End: 2023-04-25

## 2023-04-27 LAB — STFR SERPL-MCNC: 16.8 NMOL/L — SIGNIFICANT CHANGE UP (ref 12.2–27.3)

## 2023-05-04 ENCOUNTER — OUTPATIENT (OUTPATIENT)
Dept: OUTPATIENT SERVICES | Age: 18
LOS: 1 days | Discharge: HOME | End: 2023-05-04

## 2023-05-04 DIAGNOSIS — Q69.9 POLYDACTYLY, UNSPECIFIED: Chronic | ICD-10-CM

## 2023-05-05 ENCOUNTER — RESULT REVIEW (OUTPATIENT)
Age: 18
End: 2023-05-05

## 2023-05-05 ENCOUNTER — APPOINTMENT (OUTPATIENT)
Dept: PEDIATRIC HEMATOLOGY/ONCOLOGY | Facility: CLINIC | Age: 18
End: 2023-05-05
Payer: COMMERCIAL

## 2023-05-05 VITALS
HEIGHT: 63.74 IN | RESPIRATION RATE: 20 BRPM | DIASTOLIC BLOOD PRESSURE: 71 MMHG | WEIGHT: 139.77 LBS | HEART RATE: 83 BPM | OXYGEN SATURATION: 100 % | TEMPERATURE: 98.06 F | SYSTOLIC BLOOD PRESSURE: 106 MMHG | BODY MASS INDEX: 24.16 KG/M2

## 2023-05-05 DIAGNOSIS — Z87.09 PERSONAL HISTORY OF OTHER DISEASES OF THE RESPIRATORY SYSTEM: ICD-10-CM

## 2023-05-05 DIAGNOSIS — Z87.19 PERSONAL HISTORY OF OTHER DISEASES OF THE DIGESTIVE SYSTEM: ICD-10-CM

## 2023-05-05 LAB
BASOPHILS # BLD AUTO: 0.05 K/UL — SIGNIFICANT CHANGE UP (ref 0–0.2)
BASOPHILS NFR BLD AUTO: 1.2 % — SIGNIFICANT CHANGE UP (ref 0–2)
EOSINOPHIL # BLD AUTO: 0.06 K/UL — SIGNIFICANT CHANGE UP (ref 0–0.5)
EOSINOPHIL NFR BLD AUTO: 1.4 % — SIGNIFICANT CHANGE UP (ref 0–6)
HCT VFR BLD CALC: 37.3 % — SIGNIFICANT CHANGE UP (ref 34.5–45)
HGB BLD-MCNC: 12.1 G/DL — SIGNIFICANT CHANGE UP (ref 11.5–15.5)
IANC: 0.91 K/UL — LOW (ref 1.8–7.4)
IMM GRANULOCYTES NFR BLD AUTO: 0 % — SIGNIFICANT CHANGE UP (ref 0–0.9)
LYMPHOCYTES # BLD AUTO: 3.01 K/UL — SIGNIFICANT CHANGE UP (ref 1–3.3)
LYMPHOCYTES # BLD AUTO: 70 % — HIGH (ref 13–44)
MCHC RBC-ENTMCNC: 26.4 PG — LOW (ref 27–34)
MCHC RBC-ENTMCNC: 32.4 GM/DL — SIGNIFICANT CHANGE UP (ref 32–36)
MCV RBC AUTO: 81.3 FL — SIGNIFICANT CHANGE UP (ref 80–100)
MONOCYTES # BLD AUTO: 0.27 K/UL — SIGNIFICANT CHANGE UP (ref 0–0.9)
MONOCYTES NFR BLD AUTO: 6.3 % — SIGNIFICANT CHANGE UP (ref 2–14)
NEUTROPHILS # BLD AUTO: 0.91 K/UL — LOW (ref 1.8–7.4)
NEUTROPHILS NFR BLD AUTO: 21.1 % — LOW (ref 43–77)
NRBC # BLD: 0 /100 WBCS — SIGNIFICANT CHANGE UP (ref 0–0)
PLATELET # BLD AUTO: 125 K/UL — LOW (ref 150–400)
PMV BLD: 11 FL — SIGNIFICANT CHANGE UP (ref 7–13)
RBC # BLD: 4.59 M/UL — SIGNIFICANT CHANGE UP (ref 3.8–5.2)
RBC # BLD: 4.59 M/UL — SIGNIFICANT CHANGE UP (ref 3.8–5.2)
RBC # FLD: 18.1 % — HIGH (ref 10.3–14.5)
RETICS #: 56.9 K/UL — SIGNIFICANT CHANGE UP (ref 25–125)
RETICS/RBC NFR: 1.2 % — SIGNIFICANT CHANGE UP (ref 0.5–2.5)
WBC # BLD: 4.3 K/UL — SIGNIFICANT CHANGE UP (ref 3.8–10.5)
WBC # FLD AUTO: 4.3 K/UL — SIGNIFICANT CHANGE UP (ref 3.8–10.5)

## 2023-05-05 PROCEDURE — 99214 OFFICE O/P EST MOD 30 MIN: CPT

## 2023-05-08 DIAGNOSIS — D50.8 OTHER IRON DEFICIENCY ANEMIAS: ICD-10-CM

## 2023-05-08 DIAGNOSIS — C92.10 CHRONIC MYELOID LEUKEMIA, BCR/ABL-POSITIVE, NOT HAVING ACHIEVED REMISSION: ICD-10-CM

## 2023-05-08 PROBLEM — Z87.19 HISTORY OF COLITIS: Status: RESOLVED | Noted: 2023-02-25 | Resolved: 2023-05-08

## 2023-05-08 PROBLEM — Z87.09 HISTORY OF PLEURAL EFFUSION: Status: RESOLVED | Noted: 2023-03-06 | Resolved: 2023-05-08

## 2023-05-08 RX ORDER — DASATINIB 50 MG/1
50 TABLET ORAL DAILY
Qty: 30 | Refills: 0 | Status: DISCONTINUED | COMMUNITY
Start: 2023-03-02 | End: 2023-05-08

## 2023-05-08 NOTE — REVIEW OF SYSTEMS
[Immunizations are up to date by report] : Immunizations are up to date by report [Negative] : Gastrointestinal [de-identified] : no rash  [FreeTextEntry1] : CML on therapy [FreeTextEntry8] : see history

## 2023-05-08 NOTE — CONSULT LETTER
[Dear  ___] : Dear  [unfilled], [Courtesy Letter:] : I had the pleasure of seeing your patient, [unfilled], in my office today. [Please see my note below.] : Please see my note below. [Consult Closing:] : Thank you very much for allowing me to participate in the care of this patient.  If you have any questions, please do not hesitate to contact me. [Sincerely,] : Sincerely, [FreeTextEntry2] : Meagan Fu MD\par [address]\[place] [address] [FreeTextEntry3] : RAGHU Dumas\par Attending Physician, Pediatric Hematology/Oncology\par [facility]\par , [facility] [employer]\par Email: [email or web address]\par

## 2023-05-08 NOTE — PHYSICAL EXAM
[5] : was Francisco stage 5 [Francisco Stage ___] : the Francisco stage for breast development was [unfilled] [100: Normal, no complaints, no evidence of disease.] : 100: Normal, no complaints, no evidence of disease. [Normal] : affect appropriate

## 2023-05-08 NOTE — HISTORY OF PRESENT ILLNESS
[de-identified] : Sachin is a 17 year old girl who follows in our clinic for chronic myeloid leukemia and chemotherapy management.\par She was diagnosed in December 2019, treated with Dasatinib for one year but had a suboptimal response at 12 months (BM BCR ABL RT PCR 0.214%).\par She was then enrolled on [id number] - a Phase 1/2 trial for Bosutinib and began treatment on 3/5/21. She opted out of the study on 6/25/21 owing to GI side effects.\par She then restarted treatment with Dasatinib in August 2021 with dose reduction (80% dose)\par \par DISEASE SUMMARY:\par DIAGNOSIS: Chronic Myeloid Leukemia\par DIAGNOSED: 12/20/2019\par PHASE AT DIAGNOSIS: Chronic phase, 0.5% blasts in peripheral blood and bone marrow\par HEMPATH: Hypercellularity, hypo ablated nucleus containing megakaryocytes, increased M:E ratio of 4:1, 1% blasts present\par PERIPHERAL BLOOD BCR-ABL qRT PCR AT DIAGNOSIS: > 10% on International Scale\par \par DISEASE SURVEILLANCE:\par ON DASATINIB:\par 12/21 - Chronic phase CML diagnosed, 0.5% blasts, PB qRT PCR > 10% on IS\par 1 MONTH POST DIAGNOSIS - PB RT-PCR > 10% (57%)\par 3 MONTH POST DIAGNOSIS - PB RT-PCR 0.73%, BM RT-PCR 1.015%\par 6 MONTH POST DIAGNOSIS - PB RT-PCR 1.060%\par 12 MONTH POST DIAGNOSIS BONE MARROW RT -PCR 0.214% (Suboptimal/warning). Continues to have dose dependent hematologic toxicity. \par \par ON BOSUTINIB:\par 02/2021 - Enrolled on [id number] Phase 1/2 Bosutinib study due to suboptimal response and intolerance to Dasatinib, started treatment 3/5/21, phase 1 400mg/m2\par 03/2021 - Dose reduced Bosutinib due to AE, 350mg/m2 (restarted 3/25/21) \par 5/11/2021- Dose reduced bosutinib due to AEs, 300mg/m2\par Bone marrow testing after cycle 3 of bosutinib showed PCR of 0.2%. \par 6/25/21 - Opted out of study and discontinue Bosutinib due to GI toxicity\par \par RESTARTED DASATINIB at 80 mg daily in August 2021\par Jan 2022 - Bone marrow BCR ABL 1.1 %, increased from previously, due to non compliance\par Feb 2022 - Peripheral Blood BCR ABL 0.06%, now compliant. \par June 2022 - Peripheral blood BCR ABL 0.013%\par \par March 2023 - developed colitis and bilateral pleural effusions in the setting of an acute EPEC infection. Given the possiblity of Dasatinib induced colitis and pleural effusion - dose interrupted for one week. Restarted at lower dose of 50 mg daily. Increased to 70 mg and tolerated the increase well. BCR-ABL stable on the lower dose [de-identified] : Sachin is here for follow up of her CML\par Doing well\par No new concerns. Now on 70 mg tablets. No  missed doses\par No fatigue, fever, chest pain, difficulty in breathing or abdominal pain [No Feeding Issues] : no feeding issues at this time

## 2023-06-01 ENCOUNTER — OUTPATIENT (OUTPATIENT)
Dept: OUTPATIENT SERVICES | Age: 18
LOS: 1 days | Discharge: HOME | End: 2023-06-01

## 2023-06-01 DIAGNOSIS — Q69.9 POLYDACTYLY, UNSPECIFIED: Chronic | ICD-10-CM

## 2023-06-02 ENCOUNTER — RESULT REVIEW (OUTPATIENT)
Age: 18
End: 2023-06-02

## 2023-06-02 ENCOUNTER — APPOINTMENT (OUTPATIENT)
Dept: PEDIATRIC HEMATOLOGY/ONCOLOGY | Facility: CLINIC | Age: 18
End: 2023-06-02
Payer: COMMERCIAL

## 2023-06-02 VITALS
WEIGHT: 141.76 LBS | OXYGEN SATURATION: 100 % | DIASTOLIC BLOOD PRESSURE: 70 MMHG | TEMPERATURE: 98.06 F | HEIGHT: 63.46 IN | HEART RATE: 93 BPM | SYSTOLIC BLOOD PRESSURE: 105 MMHG | BODY MASS INDEX: 24.81 KG/M2 | RESPIRATION RATE: 20 BRPM

## 2023-06-02 LAB
ALBUMIN SERPL ELPH-MCNC: 4.7 G/DL — SIGNIFICANT CHANGE UP (ref 3.3–5)
ALP SERPL-CCNC: 65 U/L — SIGNIFICANT CHANGE UP (ref 40–120)
ALT FLD-CCNC: 37 U/L — HIGH (ref 4–33)
ANION GAP SERPL CALC-SCNC: 13 MMOL/L — SIGNIFICANT CHANGE UP (ref 7–14)
AST SERPL-CCNC: 44 U/L — HIGH (ref 4–32)
BASOPHILS # BLD AUTO: 0.03 K/UL — SIGNIFICANT CHANGE UP (ref 0–0.2)
BASOPHILS NFR BLD AUTO: 0.6 % — SIGNIFICANT CHANGE UP (ref 0–2)
BILIRUB SERPL-MCNC: 0.5 MG/DL — SIGNIFICANT CHANGE UP (ref 0.2–1.2)
BUN SERPL-MCNC: 12 MG/DL — SIGNIFICANT CHANGE UP (ref 7–23)
CALCIUM SERPL-MCNC: 9.5 MG/DL — SIGNIFICANT CHANGE UP (ref 8.4–10.5)
CHLORIDE SERPL-SCNC: 104 MMOL/L — SIGNIFICANT CHANGE UP (ref 98–107)
CO2 SERPL-SCNC: 22 MMOL/L — SIGNIFICANT CHANGE UP (ref 22–31)
CREAT SERPL-MCNC: 0.56 MG/DL — SIGNIFICANT CHANGE UP (ref 0.5–1.3)
EGFR: 135 ML/MIN/1.73M2 — SIGNIFICANT CHANGE UP
EOSINOPHIL # BLD AUTO: 0.04 K/UL — SIGNIFICANT CHANGE UP (ref 0–0.5)
EOSINOPHIL NFR BLD AUTO: 0.8 % — SIGNIFICANT CHANGE UP (ref 0–6)
FERRITIN SERPL-MCNC: 15 NG/ML — SIGNIFICANT CHANGE UP (ref 15–150)
GLUCOSE SERPL-MCNC: 95 MG/DL — SIGNIFICANT CHANGE UP (ref 70–99)
HCT VFR BLD CALC: 36.1 % — SIGNIFICANT CHANGE UP (ref 34.5–45)
HGB BLD-MCNC: 11.6 G/DL — SIGNIFICANT CHANGE UP (ref 11.5–15.5)
IANC: 2.17 K/UL — SIGNIFICANT CHANGE UP (ref 1.8–7.4)
IMM GRANULOCYTES NFR BLD AUTO: 0.2 % — SIGNIFICANT CHANGE UP (ref 0–0.9)
IRON SATN MFR SERPL: 28 UG/DL — LOW (ref 30–160)
IRON SATN MFR SERPL: 8 % — LOW (ref 14–50)
LYMPHOCYTES # BLD AUTO: 2.35 K/UL — SIGNIFICANT CHANGE UP (ref 1–3.3)
LYMPHOCYTES # BLD AUTO: 47.2 % — HIGH (ref 13–44)
MAGNESIUM SERPL-MCNC: 2 MG/DL — SIGNIFICANT CHANGE UP (ref 1.6–2.6)
MCHC RBC-ENTMCNC: 26.5 PG — LOW (ref 27–34)
MCHC RBC-ENTMCNC: 32.1 GM/DL — SIGNIFICANT CHANGE UP (ref 32–36)
MCV RBC AUTO: 82.6 FL — SIGNIFICANT CHANGE UP (ref 80–100)
MONOCYTES # BLD AUTO: 0.38 K/UL — SIGNIFICANT CHANGE UP (ref 0–0.9)
MONOCYTES NFR BLD AUTO: 7.6 % — SIGNIFICANT CHANGE UP (ref 2–14)
NEUTROPHILS # BLD AUTO: 2.17 K/UL — SIGNIFICANT CHANGE UP (ref 1.8–7.4)
NEUTROPHILS NFR BLD AUTO: 43.6 % — SIGNIFICANT CHANGE UP (ref 43–77)
NRBC # BLD: 0 /100 WBCS — SIGNIFICANT CHANGE UP (ref 0–0)
PHOSPHATE SERPL-MCNC: 3.8 MG/DL — SIGNIFICANT CHANGE UP (ref 2.5–4.5)
PLATELET # BLD AUTO: 125 K/UL — LOW (ref 150–400)
PMV BLD: 10.8 FL — SIGNIFICANT CHANGE UP (ref 7–13)
POTASSIUM SERPL-MCNC: 4.1 MMOL/L — SIGNIFICANT CHANGE UP (ref 3.5–5.3)
POTASSIUM SERPL-SCNC: 4.1 MMOL/L — SIGNIFICANT CHANGE UP (ref 3.5–5.3)
PROT SERPL-MCNC: 6.9 G/DL — SIGNIFICANT CHANGE UP (ref 6–8.3)
RBC # BLD: 4.37 M/UL — SIGNIFICANT CHANGE UP (ref 3.8–5.2)
RBC # BLD: 4.37 M/UL — SIGNIFICANT CHANGE UP (ref 3.8–5.2)
RBC # FLD: 17 % — HIGH (ref 10.3–14.5)
RETICS #: 64.2 K/UL — SIGNIFICANT CHANGE UP (ref 25–125)
RETICS/RBC NFR: 1.5 % — SIGNIFICANT CHANGE UP (ref 0.5–2.5)
SODIUM SERPL-SCNC: 139 MMOL/L — SIGNIFICANT CHANGE UP (ref 135–145)
TIBC SERPL-MCNC: 348 UG/DL — SIGNIFICANT CHANGE UP (ref 220–430)
UIBC SERPL-MCNC: 320 UG/DL — SIGNIFICANT CHANGE UP (ref 110–370)
WBC # BLD: 4.98 K/UL — SIGNIFICANT CHANGE UP (ref 3.8–10.5)
WBC # FLD AUTO: 4.98 K/UL — SIGNIFICANT CHANGE UP (ref 3.8–10.5)

## 2023-06-02 PROCEDURE — 99214 OFFICE O/P EST MOD 30 MIN: CPT

## 2023-06-02 NOTE — HISTORY OF PRESENT ILLNESS
[No Feeding Issues] : no feeding issues at this time [de-identified] : Sachin is a 17 year old girl who follows in our clinic for chronic myeloid leukemia and chemotherapy management.\par She was diagnosed in December 2019, treated with Dasatinib for one year but had a suboptimal response at 12 months (BM BCR ABL RT PCR 0.214%).\par She was then enrolled on [id number] - a Phase 1/2 trial for Bosutinib and began treatment on 3/5/21. She opted out of the study on 6/25/21 owing to GI side effects.\par She then restarted treatment with Dasatinib in August 2021 with dose reduction (80% dose)\par \par DISEASE SUMMARY:\par DIAGNOSIS: Chronic Myeloid Leukemia\par DIAGNOSED: 12/20/2019\par PHASE AT DIAGNOSIS: Chronic phase, 0.5% blasts in peripheral blood and bone marrow\par HEMPATH: Hypercellularity, hypo ablated nucleus containing megakaryocytes, increased M:E ratio of 4:1, 1% blasts present\par PERIPHERAL BLOOD BCR-ABL qRT PCR AT DIAGNOSIS: > 10% on International Scale\par \par DISEASE SURVEILLANCE:\par ON DASATINIB:\par 12/21 - Chronic phase CML diagnosed, 0.5% blasts, PB qRT PCR > 10% on IS\par 1 MONTH POST DIAGNOSIS - PB RT-PCR > 10% (57%)\par 3 MONTH POST DIAGNOSIS - PB RT-PCR 0.73%, BM RT-PCR 1.015%\par 6 MONTH POST DIAGNOSIS - PB RT-PCR 1.060%\par 12 MONTH POST DIAGNOSIS BONE MARROW RT -PCR 0.214% (Suboptimal/warning). Continues to have dose dependent hematologic toxicity. \par \par ON BOSUTINIB:\par 02/2021 - Enrolled on [id number] Phase 1/2 Bosutinib study due to suboptimal response and intolerance to Dasatinib, started treatment 3/5/21, phase 1 400mg/m2\par 03/2021 - Dose reduced Bosutinib due to AE, 350mg/m2 (restarted 3/25/21) \par 5/11/2021- Dose reduced bosutinib due to AEs, 300mg/m2\par Bone marrow testing after cycle 3 of bosutinib showed PCR of 0.2%. \par 6/25/21 - Opted out of study and discontinue Bosutinib due to GI toxicity\par \par RESTARTED DASATINIB at 80 mg daily in August 2021\par Jan 2022 - Bone marrow BCR ABL 1.1 %, increased from previously, due to non compliance\par Feb 2022 - Peripheral Blood BCR ABL 0.06%, now compliant. \par June 2022 - Peripheral blood BCR ABL 0.013%\par \par March 2023 - developed colitis and bilateral pleural effusions in the setting of an acute EPEC infection. Given the possiblity of Dasatinib induced colitis and pleural effusion - dose interrupted for one week. Restarted at lower dose of 50 mg daily. Increased to 70 mg and tolerated the increase well. BCR-ABL stable on the lower dose [de-identified] : Sachin is here for follow up of her CML\par Doing well\par No new concerns. Now on 70 mg tablets. No  missed doses\par No fatigue, fever, chest pain, difficulty in breathing or abdominal pain\par Periods remain stable according to her. Off iron now\par

## 2023-06-02 NOTE — CONSULT LETTER
[Dear  ___] : Dear  [unfilled], [Courtesy Letter:] : I had the pleasure of seeing your patient, [unfilled], in my office today. [Please see my note below.] : Please see my note below. [Consult Closing:] : Thank you very much for allowing me to participate in the care of this patient.  If you have any questions, please do not hesitate to contact me. [Sincerely,] : Sincerely, [FreeTextEntry2] : Meagan Fu MD\par [address]\par [place] [address] [FreeTextEntry3] : RAGHU Dumas\par Attending Physician, Pediatric Hematology/Oncology\par [facility]\par , [facility] [employer]\par Email: [email or web address]\par

## 2023-06-02 NOTE — REVIEW OF SYSTEMS
[Negative] : Allergic/Immunologic [Immunizations are up to date by report] : Immunizations are up to date by report [de-identified] : no rash  [FreeTextEntry1] : CML on therapy [FreeTextEntry8] : see history

## 2023-06-05 DIAGNOSIS — D50.8 OTHER IRON DEFICIENCY ANEMIAS: ICD-10-CM

## 2023-06-05 DIAGNOSIS — Z51.11 ENCOUNTER FOR ANTINEOPLASTIC CHEMOTHERAPY: ICD-10-CM

## 2023-06-05 DIAGNOSIS — C92.10 CHRONIC MYELOID LEUKEMIA, BCR/ABL-POSITIVE, NOT HAVING ACHIEVED REMISSION: ICD-10-CM

## 2023-06-07 ENCOUNTER — APPOINTMENT (OUTPATIENT)
Dept: PEDIATRIC HEMATOLOGY/ONCOLOGY | Facility: CLINIC | Age: 18
End: 2023-06-07
Payer: COMMERCIAL

## 2023-06-07 ENCOUNTER — RESULT REVIEW (OUTPATIENT)
Age: 18
End: 2023-06-07

## 2023-06-07 VITALS
DIASTOLIC BLOOD PRESSURE: 70 MMHG | HEART RATE: 90 BPM | OXYGEN SATURATION: 99 % | RESPIRATION RATE: 20 BRPM | TEMPERATURE: 98.24 F | HEIGHT: 63.66 IN | SYSTOLIC BLOOD PRESSURE: 108 MMHG | WEIGHT: 141.1 LBS | BODY MASS INDEX: 24.39 KG/M2

## 2023-06-07 VITALS
TEMPERATURE: 98 F | RESPIRATION RATE: 20 BRPM | OXYGEN SATURATION: 99 % | SYSTOLIC BLOOD PRESSURE: 108 MMHG | WEIGHT: 141.1 LBS | HEART RATE: 90 BPM | DIASTOLIC BLOOD PRESSURE: 70 MMHG | HEIGHT: 63.66 IN

## 2023-06-07 LAB
A1C WITH ESTIMATED AVERAGE GLUCOSE RESULT: 4.6 % — SIGNIFICANT CHANGE UP (ref 4–5.6)
ESTIMATED AVERAGE GLUCOSE: 85 — SIGNIFICANT CHANGE UP
LIDOCAIN IGE QN: 20 U/L — SIGNIFICANT CHANGE UP (ref 7–60)

## 2023-06-07 PROCEDURE — ZZZZZ: CPT

## 2023-06-07 RX ORDER — IRON SUCROSE 20 MG/ML
300 INJECTION, SOLUTION INTRAVENOUS ONCE
Refills: 0 | Status: COMPLETED | OUTPATIENT
Start: 2023-06-07 | End: 2023-06-07

## 2023-06-07 RX ADMIN — IRON SUCROSE 166.67 MILLIGRAM(S): 20 INJECTION, SOLUTION INTRAVENOUS at 12:33

## 2023-06-08 LAB — 24R-OH-CALCIDIOL SERPL-MCNC: 24.1 NG/ML — LOW (ref 30–80)

## 2023-06-16 ENCOUNTER — APPOINTMENT (OUTPATIENT)
Dept: PEDIATRIC CARDIOLOGY | Facility: CLINIC | Age: 18
End: 2023-06-16
Payer: COMMERCIAL

## 2023-06-16 VITALS
RESPIRATION RATE: 20 BRPM | OXYGEN SATURATION: 100 % | BODY MASS INDEX: 24.5 KG/M2 | SYSTOLIC BLOOD PRESSURE: 105 MMHG | HEART RATE: 82 BPM | DIASTOLIC BLOOD PRESSURE: 66 MMHG | HEIGHT: 63.78 IN | WEIGHT: 141.76 LBS

## 2023-06-16 DIAGNOSIS — Q23.1 CONGENITAL INSUFFICIENCY OF AORTIC VALVE: ICD-10-CM

## 2023-06-16 DIAGNOSIS — R06.02 SHORTNESS OF BREATH: ICD-10-CM

## 2023-06-16 DIAGNOSIS — Z00.00 ENCOUNTER FOR GENERAL ADULT MEDICAL EXAMINATION W/OUT ABNORMAL FINDINGS: ICD-10-CM

## 2023-06-16 DIAGNOSIS — Z13.6 ENCOUNTER FOR SCREENING FOR CARDIOVASCULAR DISORDERS: ICD-10-CM

## 2023-06-16 DIAGNOSIS — Z82.49 FAMILY HISTORY OF ISCHEMIC HEART DISEASE AND OTHER DISEASES OF THE CIRCULATORY SYSTEM: ICD-10-CM

## 2023-06-16 DIAGNOSIS — R01.1 CARDIAC MURMUR, UNSPECIFIED: ICD-10-CM

## 2023-06-16 DIAGNOSIS — Z92.89 PERSONAL HISTORY OF OTHER MEDICAL TREATMENT: ICD-10-CM

## 2023-06-16 DIAGNOSIS — Z78.9 OTHER SPECIFIED HEALTH STATUS: ICD-10-CM

## 2023-06-16 PROCEDURE — 93000 ELECTROCARDIOGRAM COMPLETE: CPT

## 2023-06-16 PROCEDURE — 93320 DOPPLER ECHO COMPLETE: CPT

## 2023-06-16 PROCEDURE — 93303 ECHO TRANSTHORACIC: CPT

## 2023-06-16 PROCEDURE — 99205 OFFICE O/P NEW HI 60 MIN: CPT

## 2023-06-16 PROCEDURE — 93325 DOPPLER ECHO COLOR FLOW MAPG: CPT

## 2023-06-16 NOTE — REASON FOR VISIT
[Initial Evaluation] : an initial evaluation of [Shortness Of Breath] : shortness of breath [S/P Recent Hospitalization] : status post recent hospitalization [Patient] : patient [Mother] : mother

## 2023-06-19 NOTE — PHYSICAL EXAM
[General Appearance - Alert] : alert [General Appearance - In No Acute Distress] : in no acute distress [General Appearance - Well Nourished] : well nourished [General Appearance - Well Developed] : well developed [General Appearance - Well-Appearing] : well appearing [Appearance Of Head] : the head was normocephalic [Facies] : there were no dysmorphic facial features [Sclera] : the conjunctiva were normal [Outer Ear] : the ears and nose were normal in appearance [Examination Of The Oral Cavity] : mucous membranes were moist and pink [Auscultation Breath Sounds / Voice Sounds] : breath sounds clear to auscultation bilaterally [Normal Chest Appearance] : the chest was normal in appearance [Apical Impulse] : quiet precordium with normal apical impulse [Heart Rate And Rhythm] : normal heart rate and rhythm [Heart Sounds] : normal S1 and S2 [Heart Sounds Gallop] : no gallops [Heart Sounds Pericardial Friction Rub] : no pericardial rub [Heart Sounds Click] : no clicks [Arterial Pulses] : normal upper and lower extremity pulses with no pulse delay [Edema] : no edema [Capillary Refill Test] : normal capillary refill [Bowel Sounds] : normal bowel sounds [Abdomen Soft] : soft [Nondistended] : nondistended [Abdomen Tenderness] : non-tender [Nail Clubbing] : no clubbing  or cyanosis of the fingers [Motor Tone] : normal muscle strength and tone [Cervical Lymph Nodes Enlarged Anterior] : The anterior cervical nodes were normal [Cervical Lymph Nodes Enlarged Posterior] : The posterior cervical nodes were normal [Skin Lesions] : no lesions [Skin Turgor] : normal turgor [Demonstrated Behavior - Infant Nonreactive To Parents] : interactive [Mood] : mood and affect were appropriate for age [Demonstrated Behavior] : normal behavior [Systolic] : systolic [II] : a grade 2/6 [Apical] : apex [Low] : low pitched [Vibratory] : vibratory [] : increases when lying on left side

## 2023-06-20 NOTE — HISTORY OF PRESENT ILLNESS
[FreeTextEntry1] : SHEILA is a 17 year old female who was referred for cardiac evaluation secondary to CML and chemotherapy exposure. SHEILA was diagnosed with CML in December 2019. She is on chemotherapy. SHEILA has had no cardiac complaints.  Specifically, there has been no chest pain, palpitations, shortness of breath, excessive diaphoresis, lightheadedness or syncope.  There has been no recent change in activity level, no fatigue, and no difficulty gaining weight or weight loss.  She is active in work out , and has had no recent decrease in exercise endurance.\par \par Past Medical History: She was diagnosed in December 2019, treated with Dasatinib for one year but had a suboptimal response at 12 months (BM BCR ABL RT PCR 0.214%).\par She was then enrolled on [id number] - a Phase 1/2 trial for Bosutinib and began treatment on 3/5/21. She opted out of the study on 6/25/21 owing to GI side effects. She then restarted treatment with Dasatinib in August 2021 with dose reduction (80% dose)\par March 2023 - developed colitis and bilateral pleural effusions in the setting of an acute EPEC infection. Given the possiblity of Dasatinib induced colitis and pleural effusion - dose interrupted for one week. Restarted at lower dose of 50 mg daily. Increased to 70 mg and tolerated the increase well. BCR-ABL stable on the lower dose. \par \par

## 2023-06-20 NOTE — CARDIOLOGY SUMMARY
[Today's Date] : [unfilled] [LVSF ___%] : LV Shortening Fraction [unfilled]% [FreeTextEntry1] : For chemotherapy \par Normal sinus rhythm, normal QRS axis, normal intervals (QTc 421 msec), no hypertrophy, no pre-excitation, no ST segment or T wave abnormalities. Normal EKG for age.\par \par  [FreeTextEntry2] : Trivial aortic regurgitation. LV dimensions and shortening fraction were normal.  No pericardial effusion.\par

## 2023-06-20 NOTE — CONSULT LETTER
[Today's Date] : [unfilled] [Name] : Name: [unfilled] [] : : ~~ [Today's Date:] : [unfilled] [Dear  ___:] : Dear Dr. [unfilled]: [Consult] : I had the pleasure of evaluating your patient, [unfilled]. My full evaluation follows. [Consult - Single Provider] : Thank you very much for allowing me to participate in the care of this patient. If you have any questions, please do not hesitate to contact me. [Sincerely,] : Sincerely, [FreeTextEntry4] : Meagan Fu MD [FreeTextEntry5] : [address] [FreeTextEntry6] : [place] [address] [de-identified] : Stanton Cuenca MD, FAAP, FACC, FASE\par Pediatric Cardiologist\par

## 2023-06-20 NOTE — DISCUSSION/SUMMARY
[FreeTextEntry1] : In summary,SHEILA  is a 17 year  year female followed for after finishing her chemotherapy for her CML. I discussed at length with the family that today's thorough evaluation suggests no significant cardiac pathology, but RACHELE should be considered at higher risk of a cardiac event than the general population since she has her chemotherapy. \par She has trivial aortic regurgitation, that needs to be monitored.\par She can continue with her chemotherapy.  \par She has no evidence of CHF.\par Fasting lipid panel was ordered\par The family verbalized understanding, and all questions were answered. \par Further cardiology follow-up is in 3 months with Dr. Pratt, earlier as recommended by Oncology Team or as clinically indicated. [Needs SBE Prophylaxis] : [unfilled] does not need bacterial endocarditis prophylaxis [PE + No Restrictions] : [unfilled] may participate in the entire physical education program without restriction, including all varsity competitive sports.

## 2023-06-28 NOTE — ED CLERICAL - DIVISION
[facility]... Quality 110: Preventive Care And Screening: Influenza Immunization: Influenza Immunization previously received during influenza season Quality 111:Pneumonia Vaccination Status For Older Adults: Pneumococcal vaccine (PPSV23) administered on or after patient’s 60th birthday and before the end of the measurement period Quality 402: Tobacco Use And Help With Quitting Among Adolescents: Patient screened for tobacco and never smoked Detail Level: Detailed Quality 47: Advance Care Plan: Advance Care Planning discussed and documented; advance care plan or surrogate decision maker documented in the medical record. Quality 130: Documentation Of Current Medications In The Medical Record: Current Medications Documented Quality 431: Preventive Care And Screening: Unhealthy Alcohol Use - Screening: Patient not identified as an unhealthy alcohol user when screened for unhealthy alcohol use using a systematic screening method Lumbar disc herniation

## 2023-08-11 ENCOUNTER — OUTPATIENT (OUTPATIENT)
Dept: OUTPATIENT SERVICES | Age: 18
LOS: 1 days | Discharge: HOME | End: 2023-08-11

## 2023-08-11 DIAGNOSIS — Q69.9 POLYDACTYLY, UNSPECIFIED: Chronic | ICD-10-CM

## 2023-08-14 ENCOUNTER — APPOINTMENT (OUTPATIENT)
Dept: PEDIATRIC HEMATOLOGY/ONCOLOGY | Facility: CLINIC | Age: 18
End: 2023-08-14
Payer: COMMERCIAL

## 2023-08-14 ENCOUNTER — RESULT REVIEW (OUTPATIENT)
Age: 18
End: 2023-08-14

## 2023-08-14 VITALS
BODY MASS INDEX: 24.16 KG/M2 | SYSTOLIC BLOOD PRESSURE: 100 MMHG | TEMPERATURE: 98.06 F | HEIGHT: 63.78 IN | WEIGHT: 139.77 LBS | DIASTOLIC BLOOD PRESSURE: 62 MMHG | RESPIRATION RATE: 20 BRPM | HEART RATE: 83 BPM | OXYGEN SATURATION: 100 %

## 2023-08-14 DIAGNOSIS — Z71.3 DIETARY COUNSELING AND SURVEILLANCE: ICD-10-CM

## 2023-08-14 DIAGNOSIS — E55.9 VITAMIN D DEFICIENCY, UNSPECIFIED: ICD-10-CM

## 2023-08-14 LAB
24R-OH-CALCIDIOL SERPL-MCNC: 26.1 NG/ML — LOW (ref 30–80)
ALBUMIN SERPL ELPH-MCNC: 4.3 G/DL — SIGNIFICANT CHANGE UP (ref 3.3–5)
ALP SERPL-CCNC: 66 U/L — SIGNIFICANT CHANGE UP (ref 40–120)
ALT FLD-CCNC: 18 U/L — SIGNIFICANT CHANGE UP (ref 4–33)
AMYLASE P1 CFR SERPL: 77 U/L — SIGNIFICANT CHANGE UP (ref 25–125)
ANION GAP SERPL CALC-SCNC: 10 MMOL/L — SIGNIFICANT CHANGE UP (ref 7–14)
AST SERPL-CCNC: 18 U/L — SIGNIFICANT CHANGE UP (ref 4–32)
BASOPHILS # BLD AUTO: 0.04 K/UL — SIGNIFICANT CHANGE UP (ref 0–0.2)
BASOPHILS NFR BLD AUTO: 0.9 % — SIGNIFICANT CHANGE UP (ref 0–2)
BILIRUB SERPL-MCNC: 0.6 MG/DL — SIGNIFICANT CHANGE UP (ref 0.2–1.2)
BUN SERPL-MCNC: 12 MG/DL — SIGNIFICANT CHANGE UP (ref 7–23)
CALCIUM SERPL-MCNC: 9 MG/DL — SIGNIFICANT CHANGE UP (ref 8.4–10.5)
CHLORIDE SERPL-SCNC: 104 MMOL/L — SIGNIFICANT CHANGE UP (ref 98–107)
CO2 SERPL-SCNC: 25 MMOL/L — SIGNIFICANT CHANGE UP (ref 22–31)
CREAT SERPL-MCNC: 0.63 MG/DL — SIGNIFICANT CHANGE UP (ref 0.5–1.3)
EGFR: 130 ML/MIN/1.73M2 — SIGNIFICANT CHANGE UP
EOSINOPHIL # BLD AUTO: 0.07 K/UL — SIGNIFICANT CHANGE UP (ref 0–0.5)
EOSINOPHIL NFR BLD AUTO: 1.5 % — SIGNIFICANT CHANGE UP (ref 0–6)
FERRITIN SERPL-MCNC: 10 NG/ML — LOW (ref 15–150)
GLUCOSE SERPL-MCNC: 85 MG/DL — SIGNIFICANT CHANGE UP (ref 70–99)
HCT VFR BLD CALC: 37 % — SIGNIFICANT CHANGE UP (ref 34.5–45)
HGB BLD-MCNC: 11.9 G/DL — SIGNIFICANT CHANGE UP (ref 11.5–15.5)
IANC: 1.58 K/UL — LOW (ref 1.8–7.4)
IMM GRANULOCYTES NFR BLD AUTO: 0.2 % — SIGNIFICANT CHANGE UP (ref 0–0.9)
IRON SATN MFR SERPL: 27 UG/DL — LOW (ref 30–160)
IRON SATN MFR SERPL: 8 % — LOW (ref 14–50)
LIDOCAIN IGE QN: 19 U/L — SIGNIFICANT CHANGE UP (ref 7–60)
LYMPHOCYTES # BLD AUTO: 2.62 K/UL — SIGNIFICANT CHANGE UP (ref 1–3.3)
LYMPHOCYTES # BLD AUTO: 56.6 % — HIGH (ref 13–44)
MAGNESIUM SERPL-MCNC: 1.9 MG/DL — SIGNIFICANT CHANGE UP (ref 1.6–2.6)
MCHC RBC-ENTMCNC: 26.9 PG — LOW (ref 27–34)
MCHC RBC-ENTMCNC: 32.2 GM/DL — SIGNIFICANT CHANGE UP (ref 32–36)
MCV RBC AUTO: 83.5 FL — SIGNIFICANT CHANGE UP (ref 80–100)
MONOCYTES # BLD AUTO: 0.31 K/UL — SIGNIFICANT CHANGE UP (ref 0–0.9)
MONOCYTES NFR BLD AUTO: 6.7 % — SIGNIFICANT CHANGE UP (ref 2–14)
NEUTROPHILS # BLD AUTO: 1.58 K/UL — LOW (ref 1.8–7.4)
NEUTROPHILS NFR BLD AUTO: 34.1 % — LOW (ref 43–77)
NRBC # BLD: 0 /100 WBCS — SIGNIFICANT CHANGE UP (ref 0–0)
PHOSPHATE SERPL-MCNC: 4 MG/DL — SIGNIFICANT CHANGE UP (ref 2.5–4.5)
PLATELET # BLD AUTO: 148 K/UL — LOW (ref 150–400)
PMV BLD: 11 FL — SIGNIFICANT CHANGE UP (ref 7–13)
POTASSIUM SERPL-MCNC: 4 MMOL/L — SIGNIFICANT CHANGE UP (ref 3.5–5.3)
POTASSIUM SERPL-SCNC: 4 MMOL/L — SIGNIFICANT CHANGE UP (ref 3.5–5.3)
PROT SERPL-MCNC: 7 G/DL — SIGNIFICANT CHANGE UP (ref 6–8.3)
RBC # BLD: 4.43 M/UL — SIGNIFICANT CHANGE UP (ref 3.8–5.2)
RBC # BLD: 4.43 M/UL — SIGNIFICANT CHANGE UP (ref 3.8–5.2)
RBC # FLD: 15 % — HIGH (ref 10.3–14.5)
RETICS #: 72.7 K/UL — SIGNIFICANT CHANGE UP (ref 25–125)
RETICS/RBC NFR: 1.6 % — SIGNIFICANT CHANGE UP (ref 0.5–2.5)
SODIUM SERPL-SCNC: 139 MMOL/L — SIGNIFICANT CHANGE UP (ref 135–145)
T4 FREE SERPL-MCNC: 1.3 NG/DL — SIGNIFICANT CHANGE UP (ref 0.9–1.8)
TIBC SERPL-MCNC: 351 UG/DL — SIGNIFICANT CHANGE UP (ref 220–430)
TSH SERPL-MCNC: 0.9 UIU/ML — SIGNIFICANT CHANGE UP (ref 0.27–4.2)
UIBC SERPL-MCNC: 324 UG/DL — SIGNIFICANT CHANGE UP (ref 110–370)
WBC # BLD: 4.63 K/UL — SIGNIFICANT CHANGE UP (ref 3.8–10.5)
WBC # FLD AUTO: 4.63 K/UL — SIGNIFICANT CHANGE UP (ref 3.8–10.5)

## 2023-08-14 PROCEDURE — 99214 OFFICE O/P EST MOD 30 MIN: CPT

## 2023-08-15 DIAGNOSIS — E55.9 VITAMIN D DEFICIENCY, UNSPECIFIED: ICD-10-CM

## 2023-08-15 DIAGNOSIS — T45.1X5A ADVERSE EFFECT OF ANTINEOPLASTIC AND IMMUNOSUPPRESSIVE DRUGS, INITIAL ENCOUNTER: ICD-10-CM

## 2023-08-15 DIAGNOSIS — D69.59 OTHER SECONDARY THROMBOCYTOPENIA: ICD-10-CM

## 2023-08-15 DIAGNOSIS — C92.10 CHRONIC MYELOID LEUKEMIA, BCR/ABL-POSITIVE, NOT HAVING ACHIEVED REMISSION: ICD-10-CM

## 2023-08-15 DIAGNOSIS — Z71.3 DIETARY COUNSELING AND SURVEILLANCE: ICD-10-CM

## 2023-08-15 DIAGNOSIS — D50.8 OTHER IRON DEFICIENCY ANEMIAS: ICD-10-CM

## 2023-08-17 LAB — STFR SERPL-MCNC: 24.6 NMOL/L — SIGNIFICANT CHANGE UP (ref 12.2–27.3)

## 2023-09-07 ENCOUNTER — RX RENEWAL (OUTPATIENT)
Age: 18
End: 2023-09-07

## 2023-10-05 ENCOUNTER — RX RENEWAL (OUTPATIENT)
Age: 18
End: 2023-10-05

## 2023-11-17 NOTE — HISTORY OF PRESENT ILLNESS
[No Feeding Issues] : no feeding issues at this time [de-identified] : Sachin is a 15 year old girl who follows in our clinic for chronic myeloid leukemia.\par She was diagnosed in December 2019, treated with Dasatinib for one year but had a suboptimal response at 12 months (BM BCR ABL RT PCR 0.214%).\par She is now enrolled on [id number] - a Phase 1/2 trial for Bosutinib and began treatment on 3/5/21.\par \par DISEASE SUMMARY:\par DIAGNOSIS: Chronic Myeloid Leukemia\par DIAGNOSED: 12/20/2019\par PHASE AT DIAGNOSIS: Chronic phase, 0.5% blasts in peripheral blood and bone marrow\par HEMPATH: Hypercellularity, hypo ablated nucleus containing megakaryocytes, increased M:E ratio of 4:1, 1% blasts present\par FISH/CYTOGENETICS: BCR-ABL positive\par PERIPHERAL BLOOD BCR-ABL qRT PCR AT DIAGNOSIS: > 10% on International Scale\par \par DISEASE SURVEILLANCE:\par 12/21 - Chronic phase CML diagnosed, 0.5% blasts, PB qRT PCR > 10% on IS\par 1 MONTH POST DIAGNOSIS - PB RT-PCR > 10% (57%)\par 2 MONTH POST DIAGNOSIS - PB RT-PCR 6.2%\par 3 MONTH POST DIAGNOSIS - PB RT-PCR 0.73%, BM RT-PCR 1.015%\par 6 MONTH POST DIAGNOSIS - PB RT-PCR 1.060%\par 12 MONTH POST DIAGNOSIS BONE MARROW RT -PCR 0.214% (Suboptimal/warning). Continues to have dose dependent hematologic toxicity. \par \par 02/2021 - Enrolled on [id number] Phase 1/2 Bosutinib study due to suboptimal response and intolerance to Dasatinib\par 03/2021 - Dose reduced Bosutinib due to AE\par  [de-identified] : Sachin is here for followup, on C3D12. On May 6th, began drug hold after several grade 2 AEs that did not improve, nausea, abdominal pain, and diarrhea. Today she reports she feels well and all of her side effects resolved. The day she held, May 6th, she had no vomiting, abdominal pain or diarrhea but did still have nausea that day, which resolved by the following day. She has been eating and drinking well since stopping and doing school every day with good energy.  Statement Selected

## 2023-11-20 ENCOUNTER — RX RENEWAL (OUTPATIENT)
Age: 18
End: 2023-11-20

## 2023-11-22 ENCOUNTER — OUTPATIENT (OUTPATIENT)
Dept: OUTPATIENT SERVICES | Age: 18
LOS: 1 days | Discharge: HOME | End: 2023-11-22

## 2023-11-22 DIAGNOSIS — Q69.9 POLYDACTYLY, UNSPECIFIED: Chronic | ICD-10-CM

## 2023-11-24 ENCOUNTER — APPOINTMENT (OUTPATIENT)
Dept: PEDIATRIC HEMATOLOGY/ONCOLOGY | Facility: CLINIC | Age: 18
End: 2023-11-24

## 2023-12-28 ENCOUNTER — OUTPATIENT (OUTPATIENT)
Dept: OUTPATIENT SERVICES | Age: 18
LOS: 1 days | Discharge: HOME | End: 2023-12-28

## 2023-12-28 DIAGNOSIS — Q69.9 POLYDACTYLY, UNSPECIFIED: Chronic | ICD-10-CM

## 2023-12-29 ENCOUNTER — RESULT REVIEW (OUTPATIENT)
Age: 18
End: 2023-12-29

## 2023-12-29 ENCOUNTER — APPOINTMENT (OUTPATIENT)
Dept: PEDIATRIC HEMATOLOGY/ONCOLOGY | Facility: CLINIC | Age: 18
End: 2023-12-29
Payer: COMMERCIAL

## 2023-12-29 VITALS
RESPIRATION RATE: 20 BRPM | TEMPERATURE: 98.06 F | SYSTOLIC BLOOD PRESSURE: 109 MMHG | OXYGEN SATURATION: 99 % | HEIGHT: 63.82 IN | BODY MASS INDEX: 23.81 KG/M2 | HEART RATE: 90 BPM | DIASTOLIC BLOOD PRESSURE: 71 MMHG | WEIGHT: 137.79 LBS

## 2023-12-29 DIAGNOSIS — T45.1X5A OTHER SECONDARY THROMBOCYTOPENIA: ICD-10-CM

## 2023-12-29 DIAGNOSIS — D69.59 OTHER SECONDARY THROMBOCYTOPENIA: ICD-10-CM

## 2023-12-29 LAB
A1C WITH ESTIMATED AVERAGE GLUCOSE RESULT: 4.4 % — SIGNIFICANT CHANGE UP (ref 4–5.6)
A1C WITH ESTIMATED AVERAGE GLUCOSE RESULT: 4.4 % — SIGNIFICANT CHANGE UP (ref 4–5.6)
ALBUMIN SERPL ELPH-MCNC: 4.7 G/DL — SIGNIFICANT CHANGE UP (ref 3.3–5)
ALBUMIN SERPL ELPH-MCNC: 4.7 G/DL — SIGNIFICANT CHANGE UP (ref 3.3–5)
ALP SERPL-CCNC: 77 U/L — SIGNIFICANT CHANGE UP (ref 40–120)
ALP SERPL-CCNC: 77 U/L — SIGNIFICANT CHANGE UP (ref 40–120)
ALT FLD-CCNC: 24 U/L — SIGNIFICANT CHANGE UP (ref 4–33)
ALT FLD-CCNC: 24 U/L — SIGNIFICANT CHANGE UP (ref 4–33)
ANION GAP SERPL CALC-SCNC: 10 MMOL/L — SIGNIFICANT CHANGE UP (ref 7–14)
ANION GAP SERPL CALC-SCNC: 10 MMOL/L — SIGNIFICANT CHANGE UP (ref 7–14)
AST SERPL-CCNC: 28 U/L — SIGNIFICANT CHANGE UP (ref 4–32)
AST SERPL-CCNC: 28 U/L — SIGNIFICANT CHANGE UP (ref 4–32)
BASOPHILS # BLD AUTO: 0.02 K/UL — SIGNIFICANT CHANGE UP (ref 0–0.2)
BASOPHILS # BLD AUTO: 0.02 K/UL — SIGNIFICANT CHANGE UP (ref 0–0.2)
BASOPHILS NFR BLD AUTO: 0.6 % — SIGNIFICANT CHANGE UP (ref 0–2)
BASOPHILS NFR BLD AUTO: 0.6 % — SIGNIFICANT CHANGE UP (ref 0–2)
BILIRUB SERPL-MCNC: 0.5 MG/DL — SIGNIFICANT CHANGE UP (ref 0.2–1.2)
BILIRUB SERPL-MCNC: 0.5 MG/DL — SIGNIFICANT CHANGE UP (ref 0.2–1.2)
BUN SERPL-MCNC: 13 MG/DL — SIGNIFICANT CHANGE UP (ref 7–23)
BUN SERPL-MCNC: 13 MG/DL — SIGNIFICANT CHANGE UP (ref 7–23)
CALCIUM SERPL-MCNC: 9.3 MG/DL — SIGNIFICANT CHANGE UP (ref 8.4–10.5)
CALCIUM SERPL-MCNC: 9.3 MG/DL — SIGNIFICANT CHANGE UP (ref 8.4–10.5)
CHLORIDE SERPL-SCNC: 105 MMOL/L — SIGNIFICANT CHANGE UP (ref 98–107)
CHLORIDE SERPL-SCNC: 105 MMOL/L — SIGNIFICANT CHANGE UP (ref 98–107)
CO2 SERPL-SCNC: 27 MMOL/L — SIGNIFICANT CHANGE UP (ref 22–31)
CO2 SERPL-SCNC: 27 MMOL/L — SIGNIFICANT CHANGE UP (ref 22–31)
CREAT SERPL-MCNC: 0.59 MG/DL — SIGNIFICANT CHANGE UP (ref 0.5–1.3)
CREAT SERPL-MCNC: 0.59 MG/DL — SIGNIFICANT CHANGE UP (ref 0.5–1.3)
EGFR: 132 ML/MIN/1.73M2 — SIGNIFICANT CHANGE UP
EGFR: 132 ML/MIN/1.73M2 — SIGNIFICANT CHANGE UP
EOSINOPHIL # BLD AUTO: 0.05 K/UL — SIGNIFICANT CHANGE UP (ref 0–0.5)
EOSINOPHIL # BLD AUTO: 0.05 K/UL — SIGNIFICANT CHANGE UP (ref 0–0.5)
EOSINOPHIL NFR BLD AUTO: 1.5 % — SIGNIFICANT CHANGE UP (ref 0–6)
EOSINOPHIL NFR BLD AUTO: 1.5 % — SIGNIFICANT CHANGE UP (ref 0–6)
ESTIMATED AVERAGE GLUCOSE: 80 — SIGNIFICANT CHANGE UP
ESTIMATED AVERAGE GLUCOSE: 80 — SIGNIFICANT CHANGE UP
FERRITIN SERPL-MCNC: 11 NG/ML — LOW (ref 15–150)
FERRITIN SERPL-MCNC: 11 NG/ML — LOW (ref 15–150)
GLUCOSE SERPL-MCNC: 85 MG/DL — SIGNIFICANT CHANGE UP (ref 70–99)
GLUCOSE SERPL-MCNC: 85 MG/DL — SIGNIFICANT CHANGE UP (ref 70–99)
HCT VFR BLD CALC: 36.1 % — SIGNIFICANT CHANGE UP (ref 34.5–45)
HCT VFR BLD CALC: 36.1 % — SIGNIFICANT CHANGE UP (ref 34.5–45)
HGB BLD-MCNC: 11.2 G/DL — LOW (ref 11.5–15.5)
HGB BLD-MCNC: 11.2 G/DL — LOW (ref 11.5–15.5)
IANC: 0.84 K/UL — LOW (ref 1.8–7.4)
IANC: 0.84 K/UL — LOW (ref 1.8–7.4)
IMM GRANULOCYTES NFR BLD AUTO: 0 % — SIGNIFICANT CHANGE UP (ref 0–0.9)
IMM GRANULOCYTES NFR BLD AUTO: 0 % — SIGNIFICANT CHANGE UP (ref 0–0.9)
IRON SATN MFR SERPL: 23 UG/DL — LOW (ref 30–160)
IRON SATN MFR SERPL: 23 UG/DL — LOW (ref 30–160)
IRON SATN MFR SERPL: 6 % — LOW (ref 14–50)
IRON SATN MFR SERPL: 6 % — LOW (ref 14–50)
LIDOCAIN IGE QN: 16 U/L — SIGNIFICANT CHANGE UP (ref 7–60)
LIDOCAIN IGE QN: 16 U/L — SIGNIFICANT CHANGE UP (ref 7–60)
LYMPHOCYTES # BLD AUTO: 2.18 K/UL — SIGNIFICANT CHANGE UP (ref 1–3.3)
LYMPHOCYTES # BLD AUTO: 2.18 K/UL — SIGNIFICANT CHANGE UP (ref 1–3.3)
LYMPHOCYTES # BLD AUTO: 65.5 % — HIGH (ref 13–44)
LYMPHOCYTES # BLD AUTO: 65.5 % — HIGH (ref 13–44)
MCHC RBC-ENTMCNC: 24.7 PG — LOW (ref 27–34)
MCHC RBC-ENTMCNC: 24.7 PG — LOW (ref 27–34)
MCHC RBC-ENTMCNC: 31 GM/DL — LOW (ref 32–36)
MCHC RBC-ENTMCNC: 31 GM/DL — LOW (ref 32–36)
MCV RBC AUTO: 79.7 FL — LOW (ref 80–100)
MCV RBC AUTO: 79.7 FL — LOW (ref 80–100)
MONOCYTES # BLD AUTO: 0.24 K/UL — SIGNIFICANT CHANGE UP (ref 0–0.9)
MONOCYTES # BLD AUTO: 0.24 K/UL — SIGNIFICANT CHANGE UP (ref 0–0.9)
MONOCYTES NFR BLD AUTO: 7.2 % — SIGNIFICANT CHANGE UP (ref 2–14)
MONOCYTES NFR BLD AUTO: 7.2 % — SIGNIFICANT CHANGE UP (ref 2–14)
NEUTROPHILS # BLD AUTO: 0.84 K/UL — LOW (ref 1.8–7.4)
NEUTROPHILS # BLD AUTO: 0.84 K/UL — LOW (ref 1.8–7.4)
NEUTROPHILS NFR BLD AUTO: 25.2 % — LOW (ref 43–77)
NEUTROPHILS NFR BLD AUTO: 25.2 % — LOW (ref 43–77)
NRBC # BLD: 0 /100 WBCS — SIGNIFICANT CHANGE UP (ref 0–0)
NRBC # BLD: 0 /100 WBCS — SIGNIFICANT CHANGE UP (ref 0–0)
PLATELET # BLD AUTO: 128 K/UL — LOW (ref 150–400)
PLATELET # BLD AUTO: 128 K/UL — LOW (ref 150–400)
PMV BLD: 10.4 FL — SIGNIFICANT CHANGE UP (ref 7–13)
PMV BLD: 10.4 FL — SIGNIFICANT CHANGE UP (ref 7–13)
POTASSIUM SERPL-MCNC: 4 MMOL/L — SIGNIFICANT CHANGE UP (ref 3.5–5.3)
POTASSIUM SERPL-MCNC: 4 MMOL/L — SIGNIFICANT CHANGE UP (ref 3.5–5.3)
POTASSIUM SERPL-SCNC: 4 MMOL/L — SIGNIFICANT CHANGE UP (ref 3.5–5.3)
POTASSIUM SERPL-SCNC: 4 MMOL/L — SIGNIFICANT CHANGE UP (ref 3.5–5.3)
PROT SERPL-MCNC: 8 G/DL — SIGNIFICANT CHANGE UP (ref 6–8.3)
PROT SERPL-MCNC: 8 G/DL — SIGNIFICANT CHANGE UP (ref 6–8.3)
RBC # BLD: 4.53 M/UL — SIGNIFICANT CHANGE UP (ref 3.8–5.2)
RBC # FLD: 16.1 % — HIGH (ref 10.3–14.5)
RBC # FLD: 16.1 % — HIGH (ref 10.3–14.5)
RETICS #: 62.5 K/UL — SIGNIFICANT CHANGE UP (ref 25–125)
RETICS #: 62.5 K/UL — SIGNIFICANT CHANGE UP (ref 25–125)
RETICS/RBC NFR: 1.4 % — SIGNIFICANT CHANGE UP (ref 0.5–2.5)
RETICS/RBC NFR: 1.4 % — SIGNIFICANT CHANGE UP (ref 0.5–2.5)
SODIUM SERPL-SCNC: 142 MMOL/L — SIGNIFICANT CHANGE UP (ref 135–145)
SODIUM SERPL-SCNC: 142 MMOL/L — SIGNIFICANT CHANGE UP (ref 135–145)
T4 FREE SERPL-MCNC: 1.2 NG/DL — SIGNIFICANT CHANGE UP (ref 0.9–1.8)
T4 FREE SERPL-MCNC: 1.2 NG/DL — SIGNIFICANT CHANGE UP (ref 0.9–1.8)
TIBC SERPL-MCNC: 383 UG/DL — SIGNIFICANT CHANGE UP (ref 220–430)
TIBC SERPL-MCNC: 383 UG/DL — SIGNIFICANT CHANGE UP (ref 220–430)
TSH SERPL-MCNC: 1.73 UIU/ML — SIGNIFICANT CHANGE UP (ref 0.27–4.2)
TSH SERPL-MCNC: 1.73 UIU/ML — SIGNIFICANT CHANGE UP (ref 0.27–4.2)
UIBC SERPL-MCNC: 360 UG/DL — SIGNIFICANT CHANGE UP (ref 110–370)
UIBC SERPL-MCNC: 360 UG/DL — SIGNIFICANT CHANGE UP (ref 110–370)
VIT D25+D1,25 OH+D1,25 PNL SERPL-MCNC: 92.2 PG/ML — HIGH (ref 19.9–79.3)
VIT D25+D1,25 OH+D1,25 PNL SERPL-MCNC: 92.2 PG/ML — HIGH (ref 19.9–79.3)
WBC # BLD: 3.33 K/UL — LOW (ref 3.8–10.5)
WBC # BLD: 3.33 K/UL — LOW (ref 3.8–10.5)
WBC # FLD AUTO: 3.33 K/UL — LOW (ref 3.8–10.5)
WBC # FLD AUTO: 3.33 K/UL — LOW (ref 3.8–10.5)

## 2023-12-29 PROCEDURE — 99214 OFFICE O/P EST MOD 30 MIN: CPT

## 2024-01-02 DIAGNOSIS — Q23.3 CONGENITAL MITRAL INSUFFICIENCY: ICD-10-CM

## 2024-01-02 DIAGNOSIS — R01.1 CARDIAC MURMUR, UNSPECIFIED: ICD-10-CM

## 2024-01-02 DIAGNOSIS — Q22.8 OTHER CONGENITAL MALFORMATIONS OF TRICUSPID VALVE: ICD-10-CM

## 2024-01-02 DIAGNOSIS — Q23.1 CONGENITAL INSUFFICIENCY OF AORTIC VALVE: ICD-10-CM

## 2024-01-02 DIAGNOSIS — C92.10 CHRONIC MYELOID LEUKEMIA, BCR/ABL-POSITIVE, NOT HAVING ACHIEVED REMISSION: ICD-10-CM

## 2024-01-11 PROBLEM — D69.59 CHEMOTHERAPY-INDUCED THROMBOCYTOPENIA: Status: ACTIVE | Noted: 2021-04-02

## 2024-01-11 NOTE — REVIEW OF SYSTEMS
[Negative] : Allergic/Immunologic [de-identified] : no rash  [FreeTextEntry1] : CML on therapy [FreeTextEntry8] : see history  [Immunizations are up to date by report] : Immunizations are up to date by report

## 2024-01-11 NOTE — HISTORY OF PRESENT ILLNESS
[de-identified] : Sachin is a 18 year old girl who follows in our clinic for chronic myeloid leukemia and chemotherapy management.  She was diagnosed in December 2019, treated with Dasatinib for one year but had a suboptimal response at 12 months (BM BCR ABL RT PCR 0.214%). She was then enrolled on [id number] - a Phase 1/2 trial for Bosutinib and began treatment on 3/5/21. She opted out of the study on 6/25/21 owing to GI side effects. She then restarted treatment with Dasatinib in August 2021 with dose reduction (80% dose)  DISEASE SUMMARY: DIAGNOSIS: Chronic Myeloid Leukemia DIAGNOSED: 12/20/2019 PHASE AT DIAGNOSIS: Chronic phase, 0.5% blasts in peripheral blood and bone marrow HEMPATH: Hypercellularity, hypo ablated nucleus containing megakaryocytes, increased M:E ratio of 4:1, 1% blasts present PERIPHERAL BLOOD BCR-ABL qRT PCR AT DIAGNOSIS: > 10% on International Scale  DISEASE SURVEILLANCE: ON DASATINIB: 12/21 - Chronic phase CML diagnosed, 0.5% blasts, PB qRT PCR > 10% on IS 1 MONTH POST DIAGNOSIS - PB RT-PCR > 10% (57%) 3 MONTH POST DIAGNOSIS - PB RT-PCR 0.73%, BM RT-PCR 1.015% 6 MONTH POST DIAGNOSIS - PB RT-PCR 1.060% 12 MONTH POST DIAGNOSIS BONE MARROW RT -PCR 0.214% (Suboptimal/warning). Continues to have dose dependent hematologic toxicity.   ON BOSUTINIB: 02/2021 - Enrolled on [id number] Phase 1/2 Bosutinib study due to suboptimal response and intolerance to Dasatinib, started treatment 3/5/21, phase 1 400mg/m2 03/2021 - Dose reduced Bosutinib due to AE, 350mg/m2 (restarted 3/25/21)  5/11/2021- Dose reduced bosutinib due to AEs, 300mg/m2 Bone marrow testing after cycle 3 of bosutinib showed PCR of 0.2%.  6/25/21 - Opted out of study and discontinue Bosutinib due to GI toxicity  RESTARTED DASATINIB at 80 mg daily in August 2021 2 YEARS POST DIAGNOSIS - Bone marrow BCR ABL 1.1 %, increased from previously, due to non compliance Feb 2022 - Peripheral Blood BCR ABL 0.06%, now compliant.  2.5 YEARS POST DIAGNOSIS - Peripheral blood BCR ABL 0.013%  March 2023 - developed colitis and bilateral pleural effusions in the setting of an acute EPEC infection. Given the possiblity of Dasatinib induced colitis and pleural effusion - dose interrupted for one week. Restarted at lower dose of 50 mg daily. Increased to 70 mg and tolerated the increase well. BCR-ABL stable on the lower dose   [de-identified] : Sachin comes in today for count check monitoring for her CML. Currently taking Dasatinib 70mg daily without missed dose.  Taking a multi vitamin No missed doses Her periods remain unchanged - 5-6 days needing to change 2-4 pads/day [No Feeding Issues] : no feeding issues at this time

## 2024-01-11 NOTE — CONSULT LETTER
[Dear  ___] : Dear  [unfilled], [Courtesy Letter:] : I had the pleasure of seeing your patient, [unfilled], in my office today. [Please see my note below.] : Please see my note below. [Consult Closing:] : Thank you very much for allowing me to participate in the care of this patient.  If you have any questions, please do not hesitate to contact me. [Sincerely,] : Sincerely, [FreeTextEntry2] : Meagan Fu MD\par  [address]\par  [place]  [place] [address] [FreeTextEntry3] : RAGHU Dumas\par  Attending Physician, Pediatric Hematology/Oncology\par  [facility]\par  , [employer]\par  Email: [email or web address]\par

## 2024-02-08 NOTE — ED PEDIATRIC NURSE NOTE - PERIPHERAL VASCULAR ED EDEMA
[de-identified] : 74 year old female referred for atypical lymphocytes. Saw PCP, who saw 4 atypical lymphocytes on CBC on Donal 3 2024.  Had COVID, last infection was in August 2023. COVID 4 infections total. Has fatigue. Denies fevers, cough. Denies vaccines before bloodwork. Patient was in good health. Denies new medications or supplements.   Partial lumpectomy and planning for radiation.  Had swelling in breast.   Had left total hip replacement, gets cortisone injections, has pain  no

## 2024-04-11 RX ORDER — DASATINIB 70 MG/1
70 TABLET ORAL
Qty: 30 | Refills: 3 | Status: ACTIVE | COMMUNITY
Start: 2023-04-11 | End: 1900-01-01

## 2024-04-18 ENCOUNTER — OUTPATIENT (OUTPATIENT)
Dept: OUTPATIENT SERVICES | Age: 19
LOS: 1 days | Discharge: HOME | End: 2024-04-18

## 2024-04-18 DIAGNOSIS — Q69.9 POLYDACTYLY, UNSPECIFIED: Chronic | ICD-10-CM

## 2024-04-19 ENCOUNTER — LABORATORY RESULT (OUTPATIENT)
Age: 19
End: 2024-04-19

## 2024-04-19 ENCOUNTER — APPOINTMENT (OUTPATIENT)
Dept: PEDIATRIC HEMATOLOGY/ONCOLOGY | Facility: CLINIC | Age: 19
End: 2024-04-19
Payer: COMMERCIAL

## 2024-04-19 VITALS
WEIGHT: 140.43 LBS | HEIGHT: 63.5 IN | TEMPERATURE: 97.7 F | DIASTOLIC BLOOD PRESSURE: 78 MMHG | SYSTOLIC BLOOD PRESSURE: 112 MMHG | OXYGEN SATURATION: 100 % | HEART RATE: 80 BPM | RESPIRATION RATE: 18 BRPM | BODY MASS INDEX: 24.57 KG/M2

## 2024-04-19 DIAGNOSIS — T45.1X5A AGRANULOCYTOSIS SECONDARY TO CANCER CHEMOTHERAPY: ICD-10-CM

## 2024-04-19 DIAGNOSIS — D50.8 OTHER IRON DEFICIENCY ANEMIAS: ICD-10-CM

## 2024-04-19 DIAGNOSIS — Z51.11 ENCOUNTER FOR ANTINEOPLASTIC CHEMOTHERAPY: ICD-10-CM

## 2024-04-19 DIAGNOSIS — C92.10 CHRONIC MYELOID LEUKEMIA, BCR/ABL-POSITIVE, NOT HAVING ACHIEVED REMISSION: ICD-10-CM

## 2024-04-19 DIAGNOSIS — D70.1 AGRANULOCYTOSIS SECONDARY TO CANCER CHEMOTHERAPY: ICD-10-CM

## 2024-04-19 PROCEDURE — 99214 OFFICE O/P EST MOD 30 MIN: CPT

## 2024-04-22 DIAGNOSIS — D70.1 AGRANULOCYTOSIS SECONDARY TO CANCER CHEMOTHERAPY: ICD-10-CM

## 2024-04-22 DIAGNOSIS — T45.1X5A ADVERSE EFFECT OF ANTINEOPLASTIC AND IMMUNOSUPPRESSIVE DRUGS, INITIAL ENCOUNTER: ICD-10-CM

## 2024-04-22 DIAGNOSIS — C92.10 CHRONIC MYELOID LEUKEMIA, BCR/ABL-POSITIVE, NOT HAVING ACHIEVED REMISSION: ICD-10-CM

## 2024-04-22 DIAGNOSIS — Z51.11 ENCOUNTER FOR ANTINEOPLASTIC CHEMOTHERAPY: ICD-10-CM

## 2024-04-22 DIAGNOSIS — D50.8 OTHER IRON DEFICIENCY ANEMIAS: ICD-10-CM

## 2024-05-10 LAB
BASOPHILS # BLD AUTO: 0.03 K/UL
BASOPHILS NFR BLD AUTO: 0.9 %
EOSINOPHIL # BLD AUTO: 0.04 K/UL
EOSINOPHIL NFR BLD AUTO: 1.2 %
HCT VFR BLD CALC: 40.3 %
HGB BLD-MCNC: 11.7 G/DL
IMM GRANULOCYTES NFR BLD AUTO: 0 %
LYMPHOCYTES # BLD AUTO: 1.68 K/UL
LYMPHOCYTES NFR BLD AUTO: 51.1 %
MAN DIFF?: NORMAL
MCHC RBC-ENTMCNC: 24.1 PG
MCHC RBC-ENTMCNC: 29 GM/DL
MCV RBC AUTO: 83.1 FL
MONOCYTES # BLD AUTO: 0.34 K/UL
MONOCYTES NFR BLD AUTO: 10.3 %
NEUTROPHILS # BLD AUTO: 1.2 K/UL
NEUTROPHILS NFR BLD AUTO: 36.5 %
PLATELET # BLD AUTO: 109 K/UL
RBC # BLD: 4.85 M/UL
RBC # FLD: 22.2 %
WBC # FLD AUTO: 3.29 K/UL

## 2024-05-10 NOTE — HISTORY OF PRESENT ILLNESS
[de-identified] : Sachin is a 18 year old girl who follows in our clinic for chronic myeloid leukemia and chemotherapy management.  She was diagnosed in December 2019, treated with Dasatinib for one year but had a suboptimal response at 12 months (BM BCR ABL RT PCR 0.214%). She was then enrolled on [id number] - a Phase 1/2 trial for Bosutinib and began treatment on 3/5/21. She opted out of the study on 6/25/21 owing to GI side effects. She then restarted treatment with Dasatinib in August 2021 with dose reduction (80% dose)  DISEASE SUMMARY: DIAGNOSIS: Chronic Myeloid Leukemia DIAGNOSED: 12/20/2019 PHASE AT DIAGNOSIS: Chronic phase, 0.5% blasts in peripheral blood and bone marrow HEMPATH: Hypercellularity, hypo ablated nucleus containing megakaryocytes, increased M:E ratio of 4:1, 1% blasts present PERIPHERAL BLOOD BCR-ABL qRT PCR AT DIAGNOSIS: > 10% on International Scale  DISEASE SURVEILLANCE: ON DASATINIB: 12/21 - Chronic phase CML diagnosed, 0.5% blasts, PB qRT PCR > 10% on IS 1 MONTH POST DIAGNOSIS - PB RT-PCR > 10% (57%) 3 MONTH POST DIAGNOSIS - PB RT-PCR 0.73%, BM RT-PCR 1.015% 6 MONTH POST DIAGNOSIS - PB RT-PCR 1.060% 12 MONTH POST DIAGNOSIS BONE MARROW RT -PCR 0.214% (Suboptimal/warning). Continues to have dose dependent hematologic toxicity.   ON BOSUTINIB: 02/2021 - Enrolled on [id number] Phase 1/2 Bosutinib study due to suboptimal response and intolerance to Dasatinib, started treatment 3/5/21, phase 1 400mg/m2 03/2021 - Dose reduced Bosutinib due to AE, 350mg/m2 (restarted 3/25/21)  5/11/2021- Dose reduced bosutinib due to AEs, 300mg/m2 Bone marrow testing after cycle 3 of bosutinib showed PCR of 0.2%.  6/25/21 - Opted out of study and discontinue Bosutinib due to GI toxicity  RESTARTED DASATINIB at 80 mg daily in August 2021 2 YEARS POST DIAGNOSIS - Bone marrow BCR ABL 1.1 %, increased from previously, due to non compliance Feb 2022 - Peripheral Blood BCR ABL 0.06%, now compliant.  2.5 YEARS POST DIAGNOSIS - Peripheral blood BCR ABL 0.013% March 2023 - developed colitis and bilateral pleural effusions in the setting of an acute EPEC infection. Given the possiblity of Dasatinib induced colitis and pleural effusion - dose interrupted for one week. Restarted at lower dose of 50 mg daily. Increased to 70 mg and tolerated the increase well. BCR-ABL stable on the lower dose 3 YEARS POST DIAGNOSIS - Peripheral blood BCR ABL 0.05% April 2024 - BCR-ABL level increased to 0.62% from 0.05% 3 months ago. Missed Dasatinib for almost 2.5 weeks due to Insurance issues at the time.   [de-identified] : Sachin comes in today for count check monitoring for her CML. Currently taking Dasatinib 70mg daily  She had an Insurance change last month that led to a change in her Pharmacy benefits and the new pharmacy did not fill her Dasatinib without a prior auth. She informed the office at that time and the office expedited the process with multiple apeals. Was able to get her the drug through the patient assistance program at [facility] but this has led to a 2.5 week period of no medication.  Now has been taking her medicine consistently.  Her periods remain unchanged - 5-6 days needing to change 2-4 pads/day [No Feeding Issues] : no feeding issues at this time

## 2024-05-10 NOTE — REVIEW OF SYSTEMS
[Negative] : Allergic/Immunologic [de-identified] : no rash  [FreeTextEntry1] : CML on therapy [FreeTextEntry8] : see history  [Immunizations are up to date by report] : Immunizations are up to date by report

## 2024-05-15 ENCOUNTER — RESULT REVIEW (OUTPATIENT)
Age: 19
End: 2024-05-15

## 2024-05-15 ENCOUNTER — APPOINTMENT (OUTPATIENT)
Dept: PEDIATRIC HEMATOLOGY/ONCOLOGY | Facility: CLINIC | Age: 19
End: 2024-05-15

## 2024-05-15 LAB
BASOPHILS # BLD AUTO: 0.03 K/UL — SIGNIFICANT CHANGE UP (ref 0–0.2)
BASOPHILS NFR BLD AUTO: 0.7 % — SIGNIFICANT CHANGE UP (ref 0–2)
EOSINOPHIL # BLD AUTO: 0.07 K/UL — SIGNIFICANT CHANGE UP (ref 0–0.5)
EOSINOPHIL NFR BLD AUTO: 1.6 % — SIGNIFICANT CHANGE UP (ref 0–6)
FERRITIN SERPL-MCNC: 15 NG/ML — SIGNIFICANT CHANGE UP (ref 15–150)
HCT VFR BLD CALC: 39.4 % — SIGNIFICANT CHANGE UP (ref 34.5–45)
HGB BLD-MCNC: 12.5 G/DL — SIGNIFICANT CHANGE UP (ref 11.5–15.5)
IANC: 1.05 K/UL — LOW (ref 1.8–7.4)
IMM GRANULOCYTES NFR BLD AUTO: 0 % — SIGNIFICANT CHANGE UP (ref 0–0.9)
IRON SATN MFR SERPL: 104 UG/DL — SIGNIFICANT CHANGE UP (ref 30–160)
IRON SATN MFR SERPL: 27 % — SIGNIFICANT CHANGE UP (ref 14–50)
LYMPHOCYTES # BLD AUTO: 2.86 K/UL — SIGNIFICANT CHANGE UP (ref 1–3.3)
LYMPHOCYTES # BLD AUTO: 66.8 % — HIGH (ref 13–44)
MCHC RBC-ENTMCNC: 25.6 PG — LOW (ref 27–34)
MCHC RBC-ENTMCNC: 31.7 GM/DL — LOW (ref 32–36)
MCV RBC AUTO: 80.7 FL — SIGNIFICANT CHANGE UP (ref 80–100)
MONOCYTES # BLD AUTO: 0.27 K/UL — SIGNIFICANT CHANGE UP (ref 0–0.9)
MONOCYTES NFR BLD AUTO: 6.3 % — SIGNIFICANT CHANGE UP (ref 2–14)
NEUTROPHILS # BLD AUTO: 1.05 K/UL — LOW (ref 1.8–7.4)
NEUTROPHILS NFR BLD AUTO: 24.6 % — LOW (ref 43–77)
NRBC # BLD: 0 /100 WBCS — SIGNIFICANT CHANGE UP (ref 0–0)
PLATELET # BLD AUTO: 125 K/UL — LOW (ref 150–400)
PMV BLD: 10.5 FL — SIGNIFICANT CHANGE UP (ref 7–13)
RBC # BLD: 4.88 M/UL — SIGNIFICANT CHANGE UP (ref 3.8–5.2)
RBC # BLD: 4.88 M/UL — SIGNIFICANT CHANGE UP (ref 3.8–5.2)
RBC # FLD: 20.3 % — HIGH (ref 10.3–14.5)
RETICS #: 51.2 K/UL — SIGNIFICANT CHANGE UP (ref 25–125)
RETICS/RBC NFR: 1.1 % — SIGNIFICANT CHANGE UP (ref 0.5–2.5)
TIBC SERPL-MCNC: 379 UG/DL — SIGNIFICANT CHANGE UP (ref 220–430)
UIBC SERPL-MCNC: 275 UG/DL — SIGNIFICANT CHANGE UP (ref 110–370)
WBC # BLD: 4.28 K/UL — SIGNIFICANT CHANGE UP (ref 3.8–10.5)
WBC # FLD AUTO: 4.28 K/UL — SIGNIFICANT CHANGE UP (ref 3.8–10.5)

## 2024-07-16 NOTE — ED PEDIATRIC NURSE NOTE - PERIPHERAL VASCULAR
Detail Level: Generalized
Detail Level: Simple
Detail Level: Zone
Moisturizer Recommendations: La-Roche Posay Toleriane Line
- - -

## 2024-07-30 ENCOUNTER — OUTPATIENT (OUTPATIENT)
Dept: OUTPATIENT SERVICES | Age: 19
LOS: 1 days | Discharge: HOME | End: 2024-07-30

## 2024-07-30 DIAGNOSIS — Q69.9 POLYDACTYLY, UNSPECIFIED: Chronic | ICD-10-CM

## 2024-07-31 ENCOUNTER — RESULT REVIEW (OUTPATIENT)
Age: 19
End: 2024-07-31

## 2024-07-31 ENCOUNTER — APPOINTMENT (OUTPATIENT)
Dept: PEDIATRIC HEMATOLOGY/ONCOLOGY | Facility: CLINIC | Age: 19
End: 2024-07-31
Payer: COMMERCIAL

## 2024-07-31 VITALS
HEIGHT: 63.58 IN | RESPIRATION RATE: 19 BRPM | SYSTOLIC BLOOD PRESSURE: 114 MMHG | TEMPERATURE: 98.78 F | BODY MASS INDEX: 23.96 KG/M2 | DIASTOLIC BLOOD PRESSURE: 75 MMHG | OXYGEN SATURATION: 100 % | WEIGHT: 136.91 LBS | HEART RATE: 71 BPM

## 2024-07-31 DIAGNOSIS — C92.10 CHRONIC MYELOID LEUKEMIA, BCR/ABL-POSITIVE, NOT HAVING ACHIEVED REMISSION: ICD-10-CM

## 2024-07-31 DIAGNOSIS — Z51.11 ENCOUNTER FOR ANTINEOPLASTIC CHEMOTHERAPY: ICD-10-CM

## 2024-07-31 LAB
ALBUMIN SERPL ELPH-MCNC: 4.4 G/DL — SIGNIFICANT CHANGE UP (ref 3.3–5)
ALP SERPL-CCNC: 71 U/L — SIGNIFICANT CHANGE UP (ref 40–120)
ALT FLD-CCNC: 17 U/L — SIGNIFICANT CHANGE UP (ref 4–33)
ANION GAP SERPL CALC-SCNC: 9 MMOL/L — SIGNIFICANT CHANGE UP (ref 7–14)
AST SERPL-CCNC: 21 U/L — SIGNIFICANT CHANGE UP (ref 4–32)
BASOPHILS # BLD AUTO: 0.04 K/UL — SIGNIFICANT CHANGE UP (ref 0–0.2)
BASOPHILS NFR BLD AUTO: 0.9 % — SIGNIFICANT CHANGE UP (ref 0–2)
BILIRUB SERPL-MCNC: 0.8 MG/DL — SIGNIFICANT CHANGE UP (ref 0.2–1.2)
BUN SERPL-MCNC: 12 MG/DL — SIGNIFICANT CHANGE UP (ref 7–23)
CALCIUM SERPL-MCNC: 9.3 MG/DL — SIGNIFICANT CHANGE UP (ref 8.4–10.5)
CHLORIDE SERPL-SCNC: 105 MMOL/L — SIGNIFICANT CHANGE UP (ref 98–107)
CO2 SERPL-SCNC: 24 MMOL/L — SIGNIFICANT CHANGE UP (ref 22–31)
CREAT SERPL-MCNC: 0.66 MG/DL — SIGNIFICANT CHANGE UP (ref 0.5–1.3)
EGFR: 129 ML/MIN/1.73M2 — SIGNIFICANT CHANGE UP
EOSINOPHIL # BLD AUTO: 0.07 K/UL — SIGNIFICANT CHANGE UP (ref 0–0.5)
EOSINOPHIL NFR BLD AUTO: 1.6 % — SIGNIFICANT CHANGE UP (ref 0–6)
FERRITIN SERPL-MCNC: 9 NG/ML — LOW (ref 15–150)
GLUCOSE SERPL-MCNC: 89 MG/DL — SIGNIFICANT CHANGE UP (ref 70–99)
HCT VFR BLD CALC: 36.3 % — SIGNIFICANT CHANGE UP (ref 34.5–45)
HGB BLD-MCNC: 11.4 G/DL — LOW (ref 11.5–15.5)
IANC: 2.02 K/UL — SIGNIFICANT CHANGE UP (ref 1.8–7.4)
IMM GRANULOCYTES NFR BLD AUTO: 0 % — SIGNIFICANT CHANGE UP (ref 0–0.9)
IRON SATN MFR SERPL: 28 UG/DL — LOW (ref 30–160)
IRON SATN MFR SERPL: 7 % — LOW (ref 14–50)
LYMPHOCYTES # BLD AUTO: 1.76 K/UL — SIGNIFICANT CHANGE UP (ref 1–3.3)
LYMPHOCYTES # BLD AUTO: 40.6 % — SIGNIFICANT CHANGE UP (ref 13–44)
MCHC RBC-ENTMCNC: 26.5 PG — LOW (ref 27–34)
MCHC RBC-ENTMCNC: 31.4 GM/DL — LOW (ref 32–36)
MCV RBC AUTO: 84.4 FL — SIGNIFICANT CHANGE UP (ref 80–100)
MONOCYTES # BLD AUTO: 0.45 K/UL — SIGNIFICANT CHANGE UP (ref 0–0.9)
MONOCYTES NFR BLD AUTO: 10.4 % — SIGNIFICANT CHANGE UP (ref 2–14)
NEUTROPHILS # BLD AUTO: 2.02 K/UL — SIGNIFICANT CHANGE UP (ref 1.8–7.4)
NEUTROPHILS NFR BLD AUTO: 46.5 % — SIGNIFICANT CHANGE UP (ref 43–77)
NRBC # BLD: 0 /100 WBCS — SIGNIFICANT CHANGE UP (ref 0–0)
PLATELET # BLD AUTO: 151 K/UL — SIGNIFICANT CHANGE UP (ref 150–400)
PMV BLD: 10.5 FL — SIGNIFICANT CHANGE UP (ref 7–13)
POTASSIUM SERPL-MCNC: 4.6 MMOL/L — SIGNIFICANT CHANGE UP (ref 3.5–5.3)
POTASSIUM SERPL-SCNC: 4.6 MMOL/L — SIGNIFICANT CHANGE UP (ref 3.5–5.3)
PROT SERPL-MCNC: 7.4 G/DL — SIGNIFICANT CHANGE UP (ref 6–8.3)
RBC # BLD: 4.3 M/UL — SIGNIFICANT CHANGE UP (ref 3.8–5.2)
RBC # BLD: 4.3 M/UL — SIGNIFICANT CHANGE UP (ref 3.8–5.2)
RBC # FLD: 14.7 % — HIGH (ref 10.3–14.5)
RETICS #: 47.3 K/UL — SIGNIFICANT CHANGE UP (ref 25–125)
RETICS/RBC NFR: 1.1 % — SIGNIFICANT CHANGE UP (ref 0.5–2.5)
SODIUM SERPL-SCNC: 138 MMOL/L — SIGNIFICANT CHANGE UP (ref 135–145)
TIBC SERPL-MCNC: 386 UG/DL — SIGNIFICANT CHANGE UP (ref 220–430)
UIBC SERPL-MCNC: 358 UG/DL — SIGNIFICANT CHANGE UP (ref 110–370)
WBC # BLD: 4.34 K/UL — SIGNIFICANT CHANGE UP (ref 3.8–10.5)
WBC # FLD AUTO: 4.34 K/UL — SIGNIFICANT CHANGE UP (ref 3.8–10.5)

## 2024-07-31 PROCEDURE — 99214 OFFICE O/P EST MOD 30 MIN: CPT

## 2024-07-31 NOTE — REVIEW OF SYSTEMS
[Negative] : Allergic/Immunologic [Immunizations are up to date by report] : Immunizations are up to date by report [de-identified] : no rash  [FreeTextEntry1] : CML on therapy [FreeTextEntry8] : see history

## 2024-07-31 NOTE — HISTORY OF PRESENT ILLNESS
[No Feeding Issues] : no feeding issues at this time [de-identified] : Sachin is a 18 year old girl who follows in our clinic for chronic myeloid leukemia and chemotherapy management.  She was diagnosed in December 2019, treated with Dasatinib for one year but had a suboptimal response at 12 months (BM BCR ABL RT PCR 0.214%). She was then enrolled on [id number] - a Phase 1/2 trial for Bosutinib and began treatment on 3/5/21. She opted out of the study on 6/25/21 owing to GI side effects. She then restarted treatment with Dasatinib in August 2021 with dose reduction (80% dose)  DISEASE SUMMARY: DIAGNOSIS: Chronic Myeloid Leukemia DIAGNOSED: 12/20/2019 PHASE AT DIAGNOSIS: Chronic phase, 0.5% blasts in peripheral blood and bone marrow HEMPATH: Hypercellularity, hypo ablated nucleus containing megakaryocytes, increased M:E ratio of 4:1, 1% blasts present PERIPHERAL BLOOD BCR-ABL qRT PCR AT DIAGNOSIS: > 10% on International Scale  DISEASE SURVEILLANCE: ON DASATINIB: 12/21 - Chronic phase CML diagnosed, 0.5% blasts, PB qRT PCR > 10% on IS 1 MONTH POST DIAGNOSIS - PB RT-PCR > 10% (57%) 3 MONTH POST DIAGNOSIS - PB RT-PCR 0.73%, BM RT-PCR 1.015% 6 MONTH POST DIAGNOSIS - PB RT-PCR 1.060% 12 MONTH POST DIAGNOSIS BONE MARROW RT -PCR 0.214% (Suboptimal/warning). Continues to have dose dependent hematologic toxicity.   ON BOSUTINIB: 02/2021 - Enrolled on [id number] Phase 1/2 Bosutinib study due to suboptimal response and intolerance to Dasatinib, started treatment 3/5/21, phase 1 400mg/m2 03/2021 - Dose reduced Bosutinib due to AE, 350mg/m2 (restarted 3/25/21)  5/11/2021- Dose reduced bosutinib due to AEs, 300mg/m2 Bone marrow testing after cycle 3 of bosutinib showed PCR of 0.2%.  6/25/21 - Opted out of study and discontinue Bosutinib due to GI toxicity  RESTARTED DASATINIB at 80 mg daily in August 2021 2 YEARS POST DIAGNOSIS - Bone marrow BCR ABL 1.1 %, increased from previously, due to non compliance Feb 2022 - Peripheral Blood BCR ABL 0.06%, now compliant.  2.5 YEARS POST DIAGNOSIS - Peripheral blood BCR ABL 0.013% March 2023 - developed colitis and bilateral pleural effusions in the setting of an acute EPEC infection. Given the possiblity of Dasatinib induced colitis and pleural effusion - dose interrupted for one week. Restarted at lower dose of 50 mg daily. Increased to 70 mg and tolerated the increase well. BCR-ABL stable on the lower dose 3 YEARS POST DIAGNOSIS - Peripheral blood BCR ABL 0.05% April 2024 - BCR-ABL level increased to 0.62% from 0.05% 3 months ago. Missed Dasatinib for almost 2.5 weeks due to Insurance issues at the time. 3.5 YEARS POST DIAGNOSIS - July 2024 - BCR ABL pending   [de-identified] : Sachin comes in today for count check monitoring for her CML. Currently taking Dasatinib 70mg daily. Missed doses for the last 2 days since she ran out of her medicine and Insurance did not fill it. Mom called the Pharmacy and the issue is sorted. She should get a delivery tomorrow morning  She is otherwise doing well. Was at Camp this summer and travelling to [place] soon. Energy, appetite at baseline. No new symptoms. Her periods remain unchanged - 5-6 days needing to change 2-4 pads/day

## 2024-07-31 NOTE — PHYSICAL EXAM
[Normal] : PERRL, extraocular movements intact, cranial nerves II-XII grossly intact [100: Normal, no complaints, no evidence of disease.] : 100: Normal, no complaints, no evidence of disease.

## 2024-07-31 NOTE — REVIEW OF SYSTEMS
[Negative] : Allergic/Immunologic [Immunizations are up to date by report] : Immunizations are up to date by report [de-identified] : no rash  [FreeTextEntry1] : CML on therapy [FreeTextEntry8] : see history

## 2024-07-31 NOTE — HISTORY OF PRESENT ILLNESS
[No Feeding Issues] : no feeding issues at this time [de-identified] : Sachin is a 18 year old girl who follows in our clinic for chronic myeloid leukemia and chemotherapy management.  She was diagnosed in December 2019, treated with Dasatinib for one year but had a suboptimal response at 12 months (BM BCR ABL RT PCR 0.214%). She was then enrolled on [id number] - a Phase 1/2 trial for Bosutinib and began treatment on 3/5/21. She opted out of the study on 6/25/21 owing to GI side effects. She then restarted treatment with Dasatinib in August 2021 with dose reduction (80% dose)  DISEASE SUMMARY: DIAGNOSIS: Chronic Myeloid Leukemia DIAGNOSED: 12/20/2019 PHASE AT DIAGNOSIS: Chronic phase, 0.5% blasts in peripheral blood and bone marrow HEMPATH: Hypercellularity, hypo ablated nucleus containing megakaryocytes, increased M:E ratio of 4:1, 1% blasts present PERIPHERAL BLOOD BCR-ABL qRT PCR AT DIAGNOSIS: > 10% on International Scale  DISEASE SURVEILLANCE: ON DASATINIB: 12/21 - Chronic phase CML diagnosed, 0.5% blasts, PB qRT PCR > 10% on IS 1 MONTH POST DIAGNOSIS - PB RT-PCR > 10% (57%) 3 MONTH POST DIAGNOSIS - PB RT-PCR 0.73%, BM RT-PCR 1.015% 6 MONTH POST DIAGNOSIS - PB RT-PCR 1.060% 12 MONTH POST DIAGNOSIS BONE MARROW RT -PCR 0.214% (Suboptimal/warning). Continues to have dose dependent hematologic toxicity.   ON BOSUTINIB: 02/2021 - Enrolled on [id number] Phase 1/2 Bosutinib study due to suboptimal response and intolerance to Dasatinib, started treatment 3/5/21, phase 1 400mg/m2 03/2021 - Dose reduced Bosutinib due to AE, 350mg/m2 (restarted 3/25/21)  5/11/2021- Dose reduced bosutinib due to AEs, 300mg/m2 Bone marrow testing after cycle 3 of bosutinib showed PCR of 0.2%.  6/25/21 - Opted out of study and discontinue Bosutinib due to GI toxicity  RESTARTED DASATINIB at 80 mg daily in August 2021 2 YEARS POST DIAGNOSIS - Bone marrow BCR ABL 1.1 %, increased from previously, due to non compliance Feb 2022 - Peripheral Blood BCR ABL 0.06%, now compliant.  2.5 YEARS POST DIAGNOSIS - Peripheral blood BCR ABL 0.013% March 2023 - developed colitis and bilateral pleural effusions in the setting of an acute EPEC infection. Given the possiblity of Dasatinib induced colitis and pleural effusion - dose interrupted for one week. Restarted at lower dose of 50 mg daily. Increased to 70 mg and tolerated the increase well. BCR-ABL stable on the lower dose 3 YEARS POST DIAGNOSIS - Peripheral blood BCR ABL 0.05% April 2024 - BCR-ABL level increased to 0.62% from 0.05% 3 months ago. Missed Dasatinib for almost 2.5 weeks due to Insurance issues at the time. 3.5 YEARS POST DIAGNOSIS - July 2024 - BCR ABL pending   [de-identified] : Sachin comes in today for count check monitoring for her CML. Currently taking Dasatinib 70mg daily. Missed doses for the last 2 days since she ran out of her medicine and Insurance did not fill it. Mom called the Pharmacy and the issue is sorted. She should get a delivery tomorrow morning  She is otherwise doing well. Was at Camp this summer and travelling to [place] soon. Energy, appetite at baseline. No new symptoms. Her periods remain unchanged - 5-6 days needing to change 2-4 pads/day

## 2024-08-01 DIAGNOSIS — Z51.11 ENCOUNTER FOR ANTINEOPLASTIC CHEMOTHERAPY: ICD-10-CM

## 2024-08-01 DIAGNOSIS — C92.10 CHRONIC MYELOID LEUKEMIA, BCR/ABL-POSITIVE, NOT HAVING ACHIEVED REMISSION: ICD-10-CM

## 2024-08-13 RX ORDER — CHLORHEXIDINE GLUCONATE 4 %
325 (65 FE) LIQUID (ML) TOPICAL
Qty: 45 | Refills: 1 | Status: ACTIVE | COMMUNITY
Start: 2024-08-13 | End: 1900-01-01

## 2024-09-06 NOTE — PROGRESS NOTE PEDS - I WAS PHYSICALLY PRESENT FOR THE KEY PORTIONS OF THE EVALUATION AND MANAGEMENT (E/M) SERVICE PROVIDED.  I AGREE WITH THE ABOVE HISTORY, PHYSICAL, AND PLAN WHICH I HAVE REVIEWED AND EDITED WHERE APPROPRIATE
Pt assessment completed and charted. HR in low 50s. Pt denies any pain. Continuous bladder irrigation going at moderate. Urine color is pink. Pt a/o. Oriented pt to room. Bed in lowest position and wheels locked. Call light within reach. Bedside table within reach. Non-skid socks in place. Pt denies any other needs at this time.  Pt calls out appropriately.  
Statement Selected
Statement Selected

## 2024-09-20 ENCOUNTER — APPOINTMENT (OUTPATIENT)
Dept: PEDIATRIC HEMATOLOGY/ONCOLOGY | Facility: CLINIC | Age: 19
End: 2024-09-20

## 2024-10-03 NOTE — REVIEW OF SYSTEMS
-- DO NOT REPLY / DO NOT REPLY ALL --  -- This inbox is not monitored. If this was sent to the wrong provider or department, reroute message to P [employer]. --  -- Message is from Engagement Center Operations (ECO) --    General Patient Message: Pt admitted into home health. Nurse called to report. Pt stable, has meds and nurse will be coming once a week to monitor for AFIB ,physical and occupational  therapy. No need to call unless further orders needed.  Caller Information       Contact Date/Time Type Contact Phone/Fax    10/03/2024 03:24 PM CDT Phone (Incoming) Tania [phone]     [facility]            Alternative phone number: None    Can a detailed message be left? Yes - Voicemail   Patient has been advised the message will be addressed within 2-3 business days.                 [Immunizations are up to date by report] : Immunizations are up to date by report [Negative] : Gastrointestinal [FreeTextEntry1] : CML on therapy [de-identified] : no rash

## 2024-11-01 ENCOUNTER — OUTPATIENT (OUTPATIENT)
Dept: OUTPATIENT SERVICES | Age: 19
LOS: 1 days | Discharge: HOME | End: 2024-11-01

## 2024-11-01 DIAGNOSIS — Q69.9 POLYDACTYLY, UNSPECIFIED: Chronic | ICD-10-CM

## 2024-12-18 ENCOUNTER — LABORATORY RESULT (OUTPATIENT)
Age: 19
End: 2024-12-18

## 2024-12-18 ENCOUNTER — APPOINTMENT (OUTPATIENT)
Dept: PEDIATRIC HEMATOLOGY/ONCOLOGY | Facility: CLINIC | Age: 19
End: 2024-12-18

## 2024-12-18 VITALS
DIASTOLIC BLOOD PRESSURE: 68 MMHG | BODY MASS INDEX: 23.74 KG/M2 | TEMPERATURE: 98.96 F | WEIGHT: 137.35 LBS | SYSTOLIC BLOOD PRESSURE: 106 MMHG | OXYGEN SATURATION: 99 % | HEIGHT: 63.82 IN | RESPIRATION RATE: 18 BRPM | HEART RATE: 83 BPM

## 2024-12-18 DIAGNOSIS — T45.1X5A OTHER SECONDARY THROMBOCYTOPENIA: ICD-10-CM

## 2024-12-18 DIAGNOSIS — C92.10 CHRONIC MYELOID LEUKEMIA, BCR/ABL-POSITIVE, NOT HAVING ACHIEVED REMISSION: ICD-10-CM

## 2024-12-18 DIAGNOSIS — Z51.11 ENCOUNTER FOR ANTINEOPLASTIC CHEMOTHERAPY: ICD-10-CM

## 2024-12-18 DIAGNOSIS — T45.1X5A AGRANULOCYTOSIS SECONDARY TO CANCER CHEMOTHERAPY: ICD-10-CM

## 2024-12-18 DIAGNOSIS — D70.1 AGRANULOCYTOSIS SECONDARY TO CANCER CHEMOTHERAPY: ICD-10-CM

## 2024-12-18 DIAGNOSIS — D69.59 OTHER SECONDARY THROMBOCYTOPENIA: ICD-10-CM

## 2024-12-18 PROCEDURE — 99214 OFFICE O/P EST MOD 30 MIN: CPT

## 2024-12-19 DIAGNOSIS — Z51.11 ENCOUNTER FOR ANTINEOPLASTIC CHEMOTHERAPY: ICD-10-CM

## 2024-12-19 DIAGNOSIS — D69.59 OTHER SECONDARY THROMBOCYTOPENIA: ICD-10-CM

## 2024-12-19 DIAGNOSIS — D70.1 AGRANULOCYTOSIS SECONDARY TO CANCER CHEMOTHERAPY: ICD-10-CM

## 2024-12-19 DIAGNOSIS — C92.10 CHRONIC MYELOID LEUKEMIA, BCR/ABL-POSITIVE, NOT HAVING ACHIEVED REMISSION: ICD-10-CM

## 2024-12-19 DIAGNOSIS — T45.1X5A ADVERSE EFFECT OF ANTINEOPLASTIC AND IMMUNOSUPPRESSIVE DRUGS, INITIAL ENCOUNTER: ICD-10-CM

## 2024-12-30 ENCOUNTER — EMERGENCY (EMERGENCY)
Age: 19
LOS: 1 days | Discharge: HOME | End: 2024-12-30
Attending: STUDENT IN AN ORGANIZED HEALTH CARE EDUCATION/TRAINING PROGRAM | Admitting: STUDENT IN AN ORGANIZED HEALTH CARE EDUCATION/TRAINING PROGRAM

## 2024-12-30 VITALS
SYSTOLIC BLOOD PRESSURE: 115 MMHG | HEART RATE: 103 BPM | TEMPERATURE: 98 F | RESPIRATION RATE: 18 BRPM | DIASTOLIC BLOOD PRESSURE: 78 MMHG | OXYGEN SATURATION: 99 %

## 2024-12-30 VITALS
RESPIRATION RATE: 24 BRPM | SYSTOLIC BLOOD PRESSURE: 108 MMHG | DIASTOLIC BLOOD PRESSURE: 75 MMHG | OXYGEN SATURATION: 100 % | WEIGHT: 84.88 LBS | HEART RATE: 124 BPM | TEMPERATURE: 101 F

## 2024-12-30 DIAGNOSIS — Q69.9 POLYDACTYLY, UNSPECIFIED: Chronic | ICD-10-CM

## 2024-12-30 LAB
ALBUMIN SERPL ELPH-MCNC: 4.6 G/DL — SIGNIFICANT CHANGE UP (ref 3.3–5)
ALP SERPL-CCNC: 69 U/L — SIGNIFICANT CHANGE UP (ref 40–120)
ALT FLD-CCNC: 19 U/L — SIGNIFICANT CHANGE UP (ref 4–33)
ANION GAP SERPL CALC-SCNC: 13 MMOL/L — SIGNIFICANT CHANGE UP (ref 7–14)
AST SERPL-CCNC: 24 U/L — SIGNIFICANT CHANGE UP (ref 4–32)
B PERT DNA SPEC QL NAA+PROBE: SIGNIFICANT CHANGE UP
B PERT+PARAPERT DNA PNL SPEC NAA+PROBE: SIGNIFICANT CHANGE UP
BASOPHILS # BLD AUTO: 0.04 K/UL — SIGNIFICANT CHANGE UP (ref 0–0.2)
BASOPHILS NFR BLD AUTO: 0.9 % — SIGNIFICANT CHANGE UP (ref 0–2)
BILIRUB SERPL-MCNC: 0.6 MG/DL — SIGNIFICANT CHANGE UP (ref 0.2–1.2)
BUN SERPL-MCNC: 7 MG/DL — SIGNIFICANT CHANGE UP (ref 7–23)
C PNEUM DNA SPEC QL NAA+PROBE: SIGNIFICANT CHANGE UP
CALCIUM SERPL-MCNC: 9.3 MG/DL — SIGNIFICANT CHANGE UP (ref 8.4–10.5)
CHLORIDE SERPL-SCNC: 102 MMOL/L — SIGNIFICANT CHANGE UP (ref 98–107)
CO2 SERPL-SCNC: 22 MMOL/L — SIGNIFICANT CHANGE UP (ref 22–31)
CREAT SERPL-MCNC: 0.56 MG/DL — SIGNIFICANT CHANGE UP (ref 0.5–1.3)
EGFR: 133 ML/MIN/1.73M2 — SIGNIFICANT CHANGE UP
EOSINOPHIL # BLD AUTO: 0 K/UL — SIGNIFICANT CHANGE UP (ref 0–0.5)
EOSINOPHIL NFR BLD AUTO: 0 % — SIGNIFICANT CHANGE UP (ref 0–6)
FLUAV H1 2009 PAND RNA SPEC QL NAA+PROBE: DETECTED
FLUBV RNA SPEC QL NAA+PROBE: SIGNIFICANT CHANGE UP
GLUCOSE SERPL-MCNC: 110 MG/DL — HIGH (ref 70–99)
HADV DNA SPEC QL NAA+PROBE: SIGNIFICANT CHANGE UP
HCOV 229E RNA SPEC QL NAA+PROBE: SIGNIFICANT CHANGE UP
HCOV HKU1 RNA SPEC QL NAA+PROBE: SIGNIFICANT CHANGE UP
HCOV NL63 RNA SPEC QL NAA+PROBE: SIGNIFICANT CHANGE UP
HCOV OC43 RNA SPEC QL NAA+PROBE: SIGNIFICANT CHANGE UP
HCT VFR BLD CALC: 33.8 % — LOW (ref 34.5–45)
HGB BLD-MCNC: 10.5 G/DL — LOW (ref 11.5–15.5)
HMPV RNA SPEC QL NAA+PROBE: SIGNIFICANT CHANGE UP
HPIV1 RNA SPEC QL NAA+PROBE: SIGNIFICANT CHANGE UP
HPIV2 RNA SPEC QL NAA+PROBE: SIGNIFICANT CHANGE UP
HPIV3 RNA SPEC QL NAA+PROBE: SIGNIFICANT CHANGE UP
HPIV4 RNA SPEC QL NAA+PROBE: SIGNIFICANT CHANGE UP
IANC: 2.35 K/UL — SIGNIFICANT CHANGE UP (ref 1.8–7.4)
LYMPHOCYTES # BLD AUTO: 1.18 K/UL — SIGNIFICANT CHANGE UP (ref 1–3.3)
LYMPHOCYTES # BLD AUTO: 25.9 % — SIGNIFICANT CHANGE UP (ref 13–44)
M PNEUMO DNA SPEC QL NAA+PROBE: SIGNIFICANT CHANGE UP
MANUAL SMEAR VERIFICATION: SIGNIFICANT CHANGE UP
MCHC RBC-ENTMCNC: 25 PG — LOW (ref 27–34)
MCHC RBC-ENTMCNC: 31.1 G/DL — LOW (ref 32–36)
MCV RBC AUTO: 80.5 FL — SIGNIFICANT CHANGE UP (ref 80–100)
MONOCYTES # BLD AUTO: 0.28 K/UL — SIGNIFICANT CHANGE UP (ref 0–0.9)
MONOCYTES NFR BLD AUTO: 6.2 % — SIGNIFICANT CHANGE UP (ref 2–14)
NEUTROPHILS # BLD AUTO: 2.76 K/UL — SIGNIFICANT CHANGE UP (ref 1.8–7.4)
NEUTROPHILS NFR BLD AUTO: 59.8 % — SIGNIFICANT CHANGE UP (ref 43–77)
NEUTS BAND # BLD: 0.9 % — SIGNIFICANT CHANGE UP (ref 0–6)
PLAT MORPH BLD: NORMAL — SIGNIFICANT CHANGE UP
PLATELET # BLD AUTO: 84 K/UL — LOW (ref 150–400)
PLATELET COUNT - ESTIMATE: ABNORMAL
POTASSIUM SERPL-MCNC: 3.7 MMOL/L — SIGNIFICANT CHANGE UP (ref 3.5–5.3)
POTASSIUM SERPL-SCNC: 3.7 MMOL/L — SIGNIFICANT CHANGE UP (ref 3.5–5.3)
PROT SERPL-MCNC: 7.8 G/DL — SIGNIFICANT CHANGE UP (ref 6–8.3)
RAPID RVP RESULT: DETECTED
RBC # BLD: 4.2 M/UL — SIGNIFICANT CHANGE UP (ref 3.8–5.2)
RBC # FLD: 16.6 % — HIGH (ref 10.3–14.5)
RBC BLD AUTO: NORMAL — SIGNIFICANT CHANGE UP
RSV RNA SPEC QL NAA+PROBE: SIGNIFICANT CHANGE UP
RV+EV RNA SPEC QL NAA+PROBE: SIGNIFICANT CHANGE UP
SARS-COV-2 RNA SPEC QL NAA+PROBE: SIGNIFICANT CHANGE UP
SMUDGE CELLS # BLD: PRESENT — SIGNIFICANT CHANGE UP
SODIUM SERPL-SCNC: 137 MMOL/L — SIGNIFICANT CHANGE UP (ref 135–145)
VARIANT LYMPHS # BLD: 6.3 % — HIGH (ref 0–6)
WBC # BLD: 4.55 K/UL — SIGNIFICANT CHANGE UP (ref 3.8–10.5)
WBC # FLD AUTO: 4.55 K/UL — SIGNIFICANT CHANGE UP (ref 3.8–10.5)

## 2024-12-30 PROCEDURE — 99285 EMERGENCY DEPT VISIT HI MDM: CPT

## 2024-12-30 RX ORDER — OSELTAMIVIR PHOSPHATE 75 MG
60 CAPSULE ORAL ONCE
Refills: 0 | Status: DISCONTINUED | OUTPATIENT
Start: 2024-12-30 | End: 2024-12-30

## 2024-12-30 RX ORDER — OSELTAMIVIR PHOSPHATE 75 MG
75 CAPSULE ORAL ONCE
Refills: 0 | Status: COMPLETED | OUTPATIENT
Start: 2024-12-30 | End: 2024-12-30

## 2024-12-30 RX ORDER — OSELTAMIVIR PHOSPHATE 75 MG
1 CAPSULE ORAL
Qty: 9 | Refills: 0
Start: 2024-12-30 | End: 2025-01-03

## 2024-12-30 RX ORDER — CEFTRIAXONE SODIUM 1 G
2000 VIAL (EA) INJECTION ONCE
Refills: 0 | Status: COMPLETED | OUTPATIENT
Start: 2024-12-30 | End: 2024-12-30

## 2024-12-30 RX ADMIN — Medication 75 MILLIGRAM(S): at 05:20

## 2024-12-30 RX ADMIN — Medication 100 MILLIGRAM(S): at 02:42

## 2024-12-30 NOTE — ED PROVIDER NOTE - OBJECTIVE STATEMENT
20 yo female, with CML on dasatinib, here for 1 day of fever, cough, congestion and TMax 101.8. Was in normal state of health. Dad with similar symptoms at home. No belly pain, vomiting, or diarrhea at home. Eating normally.     PMHx: as above. Foot surgery in past. Medications: sprycel 70 mg tablet. No allergies, IUTD. LMP earlier in december.

## 2024-12-30 NOTE — ED PROVIDER NOTE - CARE PROVIDER_API CALL
Meagan Fu  Pediatrics  [address] [place] [address]  Phone: [phone]  Fax: [phone]  Established Patient  Follow Up Time: 1-3 Days

## 2024-12-30 NOTE — ED PEDIATRIC NURSE REASSESSMENT NOTE - GENERAL PATIENT STATE
comfortable appearance/cooperative/family/SO at bedside
comfortable appearance/cooperative/family/SO at bedside
comfortable appearance/cooperative/smiling/interactive

## 2024-12-30 NOTE — ED PROVIDER NOTE - PHYSICAL EXAMINATION
Gen: NAD, comfortable laying in bed  HEENT: Normocephalic atraumatic, moist mucus membranes  Heart: audible S1 S2, regular rate and rhythm, no murmurs, gallops or rubs  Lungs: clear to auscultation bilaterally, no cough, wheezes rales or rhonchi  Abd: soft, non-tender, non-distended  Ext: FROM, no peripheral edema  Neuro: normal tone, CNs grossly intact, strength and sensation grossly intact, affect appropriate  Skin: warm, well perfused, no rashes or nodules visible

## 2024-12-30 NOTE — ED PROVIDER NOTE - PATIENT PORTAL LINK FT
You can access the FollowMyHealth Patient Portal offered by [facility] by registering at the following website: [email or web address]. By joining [employer]’s FollowMyHealth portal, you will also be able to view your health information using other applications (apps) compatible with our system.

## 2024-12-30 NOTE — ED PEDIATRIC TRIAGE NOTE - CHIEF COMPLAINT QUOTE
fever starting today, tmax 101.8. endorses runny nose, denies vomiting.  tylenol at 11:40pm. no port, on oral chemo. alert and awake in triage, acting approrpaite.  pmhx leukemia, nkda, iutd.

## 2024-12-30 NOTE — ED PROVIDER NOTE - NSFOLLOWUPINSTRUCTIONS_ED_ALL_ED_FT
Fever in Children    Your child was seen in the Emergency Department for a fever.      A fever is an increase in the body's temperature. It is usually defined as a temperature of 100.4°F (38°C) or higher. In children older than 3 months, a brief mild or moderate fever generally has no long-term effect, and it usually does not need treatment. In children younger than 3 months, a fever may indicate a serious problem.  The sweating that may occur with repeated or prolonged fever may also cause mild dehydration.    Fever is typically caused by infection.  Your health care provider may have tested your child during your Emergency Department visit to identify the cause of the fever.  Most fevers in children are caused by viruses and blood tests are not routinely required.    General tips for managing fevers at home:  -Give over-the-counter and prescription medicines only as told by your child's health care provider. Carefully follow dosing instructions.   -If your child was prescribed an antibiotic medicine, give it as prescribed and do not stop giving your child the antibiotic even if he or she starts to feel better.  -Watch your child's condition for any changes. Let your child's health care provider know about them.   -Have your child rest as needed.   -Have your child drink enough fluid to keep his or her urine clear to pale yellow. This helps to prevent dehydration.   -Sponge or bathe your child with room-temperature water to help reduce body temperature as needed. Do not use cold water, and do not do this if it makes your child more fussy or uncomfortable.   -If your child's fever is caused by an infection that spreads from person to person (is contagious), such as a cold or the flu, he or she should stay home. He or she may leave the house only to get medical care if needed. The child should not return to school or  until at least 24 hours after the fever is gone. The fever should be gone without the use of medicines.     Follow-up with your pediatrician in 1-2 days to make sure that your child is doing better.    Return to the Emergency Department if your child:  -Becomes limp or floppy, or is not responding to you.  -Has fever more than 7-10 days, or fever more than 5 days if with rash, cracked lips, or pink eyes.   -Has wheezing or shortness of breath.   -Has a febrile seizure.   -Is dizzy or faints.   -Will not drink.   -Develops any of the following:   ·         A rash, a stiff neck, or a severe headache.   ·         Severe pain in the abdomen.   ·         Persistent or severe vomiting or diarrhea.   ·         A severe or productive cough.  -Is one year old or younger, and you notice signs of dehydration. These may include:   ·         A sunken soft spot (fontanel) on his or her head.   ·         No wet diapers in 6 hours.   ·         Increased fussiness.  -Is one year old or older, and you notice signs of dehydration. These may include:   ·         No urine in 8–12 hours.   ·         Cracked lips.   ·         Not making tears while crying.   ·         Dry mouth.   ·         Sunken eyes.   ·         Sleepiness.   ·         Weakness. Viral Illness in Children    Your child was seen for a fever. She tested positive for the Flu. She got one dose of tamiflu, with a course sent home for a full course. Please continue taking the tamiflu for the amount of time that it is prescribed.     Your child was seen in the Emergency Department and diagnosed with a viral infection.    Viruses are tiny germs that can get into a person's body and cause illness. A virus is the most common cause of illness and fever among children. There are many different types of viruses, and they cause many types of illness, depending on what part of the body is affected. If the virus settles in the nose, throat, and lungs, it causes cough, congestion, and sometimes headache. If it settles in the stomach and intestinal tract, it may cause vomiting and diarrhea. Sometimes it causes vague symptoms of "feeling bad all over," with fussiness, poor appetite, poor sleeping, and lots of crying. A rash may also appear for the first few days, then fade away. Other symptoms can include earache, sore throat, and swollen glands.     A viral illness usually lasts 3 to 5 days, but sometimes it lasts longer, even up to 1 to 2 weeks.  ANTIBIOTICS DON’T HELP.     General tips for taking care of a child who has a viral infection:  -Have your child rest.   -Give your child acetaminophen (Tylenol) and/or ibuprofen (Advil, Motrin) for fever, pain, or fussiness. Read and follow all instructions on the label.   -Be careful when giving your child over-the-counter cold or flu medicines and acetaminophen at the same time. Many of these medicines also contain acetaminophen. Read the labels to make sure that you are not giving your child more than the recommended dose. Too much Tylenol can be harmful.   -Be careful with cough and cold medicines. Don't give them to children younger than 4 years, because they don't work for children that age and can even be harmful. For children 4 years and older, always follow all the instructions carefully. Make sure you know how much medicine to give and how long to use it. And use the dosing device if one is included.   -Attempt to give your child lots of fluids, enough so that the urine is light yellow or clear like water. This is very important if your child is vomiting or has diarrhea. Give your child sips of water or drinks such as Pedialyte. Pedialyte contains a mix of salt, sugar, and minerals. You can buy them at drugstores or grocery stores. Give these drinks as long as your child is throwing up or has diarrhea. Do not use them as the only source of liquids or food for more than 1 to 2 days.   -Keep your child home from school, , or other public places while he or she has a fever.   Follow up with your pediatrician in 1-2 days to make sure that your child is doing better.    Return to the Emergency Department if:  -Your child has symptoms of a viral illness for longer than expected.  Ask your child’s health care provider how long symptoms should last.  -Treatment at home is not controlling your child's symptoms or they are getting worse.  -Your child has signs of needing more fluids. These signs include sunken eyes with few tears, dry mouth with little or no spit, and little or no urine for 8-12 hours.  -Your child who is younger than 2 months has a temperature of 100.4°F (38°C) or higher if not already evaluated for that.  -Your child has trouble breathing.   -Your child has a severe headache or has a stiff neck.

## 2024-12-30 NOTE — ED PROVIDER NOTE - PROGRESS NOTE DETAILS
Flu +. Discussed with hematology - plan to give dose of tamiflu and send home on course of tamiflu.   - , PGY-3

## 2024-12-30 NOTE — ED PROVIDER NOTE - CLINICAL SUMMARY MEDICAL DECISION MAKING FREE TEXT BOX
20 yo female, with CML on dasatinib, here for 1 day of fever, cough, congestion and TMax 101.8. On exam, awake, alert and non toxic appearing, nonfocal physical exam, plan to get CBC, CMP, BCx, RVP and ceftriaxone. Will also plan to discuss with hematology. Likely viral, given symptoms and with sick contacts at home. Dispo to depend on labs.   - , PGY-3 20 yo female, with CML on dasatinib, here for 1 day of fever, cough, congestion and TMax 101.8. On exam, awake, alert and non toxic appearing, nonfocal physical exam, plan to get CBC, CMP, BCx, RVP and ceftriaxone. Will also plan to discuss with hematology. Likely viral, given symptoms and with sick contacts at home. Dispo to depend on labs.   - [facility], PGY-3    attending mdm: agree with above. in summary, 19 year old female, with CML on chemo, here with fever tmax 101.8 at home. + cough and congestion. non focal exam, pt non toxic, well appearing. no port. pt received CTX, labs and cxs done. + flu positive. discussed with onc, tamiflu and dc home. Adriel Pitts MD Attending

## 2024-12-30 NOTE — ED PEDIATRIC NURSE REASSESSMENT NOTE - NS ED NURSE REASSESS COMMENT FT2
Break coverage for Tiffany RN. Patient awake & alert, denies any pain @ this time. IV intact & antibiotics as per order. Mom @ bedside, safety & isolation precautions maintained, will continue to monitor.
pt awake and alert laying in stretcher. mom at bedside. breathing comfortably no distress. skin is pink and warm cap refill is less than 2 seconds. please see emar and flowsheets for more details
pt awake and alert laying in stretcher. mom at bedside. breathing comfortably no distress. skin is pink and warm cap refill is less than 2 seconds. please see emar and flowsheets for more details.

## 2025-01-01 ENCOUNTER — OUTPATIENT (OUTPATIENT)
Dept: OUTPATIENT SERVICES | Age: 20
LOS: 1 days | Discharge: HOME | End: 2025-01-01

## 2025-01-01 DIAGNOSIS — Q69.9 POLYDACTYLY, UNSPECIFIED: Chronic | ICD-10-CM

## 2025-01-04 LAB
CULTURE RESULTS: SIGNIFICANT CHANGE UP
SPECIMEN SOURCE: SIGNIFICANT CHANGE UP

## 2025-01-10 ENCOUNTER — APPOINTMENT (OUTPATIENT)
Dept: PEDIATRIC HEMATOLOGY/ONCOLOGY | Facility: CLINIC | Age: 20
End: 2025-01-10

## 2025-01-10 VITALS
HEIGHT: 63.35 IN | OXYGEN SATURATION: 98 % | TEMPERATURE: 99.14 F | HEART RATE: 73 BPM | DIASTOLIC BLOOD PRESSURE: 73 MMHG | WEIGHT: 134.7 LBS | RESPIRATION RATE: 18 BRPM | BODY MASS INDEX: 23.57 KG/M2 | SYSTOLIC BLOOD PRESSURE: 114 MMHG

## 2025-01-10 VITALS
TEMPERATURE: 99 F | SYSTOLIC BLOOD PRESSURE: 114 MMHG | OXYGEN SATURATION: 98 % | WEIGHT: 134.7 LBS | HEIGHT: 63.35 IN | RESPIRATION RATE: 18 BRPM | DIASTOLIC BLOOD PRESSURE: 73 MMHG | HEART RATE: 73 BPM

## 2025-01-10 PROCEDURE — ZZZZZ: CPT

## 2025-01-10 RX ORDER — IRON SUCROSE 20 MG/ML
300 INJECTION, SOLUTION INTRAVENOUS
Refills: 0 | Status: DISCONTINUED | OUTPATIENT
Start: 2025-01-10 | End: 2025-03-31

## 2025-01-10 RX ADMIN — IRON SUCROSE 166.67 MILLIGRAM(S): 20 INJECTION, SOLUTION INTRAVENOUS at 15:23

## 2025-01-10 RX ADMIN — IRON SUCROSE 300 MILLIGRAM(S): 20 INJECTION, SOLUTION INTRAVENOUS at 17:00

## 2025-01-13 DIAGNOSIS — C92.01 ACUTE MYELOBLASTIC LEUKEMIA, IN REMISSION: ICD-10-CM

## 2025-01-13 DIAGNOSIS — E55.9 VITAMIN D DEFICIENCY, UNSPECIFIED: ICD-10-CM

## 2025-01-13 DIAGNOSIS — D50.8 OTHER IRON DEFICIENCY ANEMIAS: ICD-10-CM

## 2025-01-16 ENCOUNTER — APPOINTMENT (OUTPATIENT)
Dept: PEDIATRIC HEMATOLOGY/ONCOLOGY | Facility: CLINIC | Age: 20
End: 2025-01-16

## 2025-01-16 VITALS
RESPIRATION RATE: 18 BRPM | SYSTOLIC BLOOD PRESSURE: 122 MMHG | OXYGEN SATURATION: 100 % | WEIGHT: 136.69 LBS | BODY MASS INDEX: 23.62 KG/M2 | DIASTOLIC BLOOD PRESSURE: 74 MMHG | HEIGHT: 63.86 IN | TEMPERATURE: 97.7 F | HEART RATE: 86 BPM

## 2025-01-16 PROCEDURE — ZZZZZ: CPT

## 2025-01-16 RX ADMIN — IRON SUCROSE 166.67 MILLIGRAM(S): 20 INJECTION, SOLUTION INTRAVENOUS at 16:44

## 2025-01-23 ENCOUNTER — APPOINTMENT (OUTPATIENT)
Dept: PEDIATRIC HEMATOLOGY/ONCOLOGY | Facility: CLINIC | Age: 20
End: 2025-01-23

## 2025-01-23 VITALS
HEART RATE: 85 BPM | SYSTOLIC BLOOD PRESSURE: 112 MMHG | TEMPERATURE: 98 F | DIASTOLIC BLOOD PRESSURE: 72 MMHG | RESPIRATION RATE: 18 BRPM | OXYGEN SATURATION: 100 %

## 2025-01-23 PROCEDURE — ZZZZZ: CPT

## 2025-01-23 RX ADMIN — IRON SUCROSE 166.67 MILLIGRAM(S): 20 INJECTION, SOLUTION INTRAVENOUS at 16:15

## 2025-01-28 ENCOUNTER — NON-APPOINTMENT (OUTPATIENT)
Age: 20
End: 2025-01-28

## 2025-01-30 ENCOUNTER — APPOINTMENT (OUTPATIENT)
Dept: PEDIATRIC HEMATOLOGY/ONCOLOGY | Facility: CLINIC | Age: 20
End: 2025-01-30

## 2025-02-12 ENCOUNTER — NON-APPOINTMENT (OUTPATIENT)
Age: 20
End: 2025-02-12

## 2025-04-01 ENCOUNTER — OUTPATIENT (OUTPATIENT)
Dept: OUTPATIENT SERVICES | Age: 20
LOS: 1 days | Discharge: HOME | End: 2025-04-01

## 2025-04-01 DIAGNOSIS — Q69.9 POLYDACTYLY, UNSPECIFIED: Chronic | ICD-10-CM

## 2025-04-04 ENCOUNTER — LABORATORY RESULT (OUTPATIENT)
Age: 20
End: 2025-04-04

## 2025-04-04 ENCOUNTER — APPOINTMENT (OUTPATIENT)
Dept: PEDIATRIC HEMATOLOGY/ONCOLOGY | Facility: CLINIC | Age: 20
End: 2025-04-04
Payer: COMMERCIAL

## 2025-04-04 VITALS
WEIGHT: 137.35 LBS | HEART RATE: 86 BPM | BODY MASS INDEX: 24.03 KG/M2 | HEIGHT: 63.43 IN | OXYGEN SATURATION: 100 % | RESPIRATION RATE: 20 BRPM | DIASTOLIC BLOOD PRESSURE: 74 MMHG | TEMPERATURE: 97.88 F | SYSTOLIC BLOOD PRESSURE: 110 MMHG

## 2025-04-04 DIAGNOSIS — D50.8 OTHER IRON DEFICIENCY ANEMIAS: ICD-10-CM

## 2025-04-04 DIAGNOSIS — C92.10 CHRONIC MYELOID LEUKEMIA, BCR/ABL-POSITIVE, NOT HAVING ACHIEVED REMISSION: ICD-10-CM

## 2025-04-04 DIAGNOSIS — D69.59 OTHER SECONDARY THROMBOCYTOPENIA: ICD-10-CM

## 2025-04-04 DIAGNOSIS — T45.1X5A OTHER SECONDARY THROMBOCYTOPENIA: ICD-10-CM

## 2025-04-04 DIAGNOSIS — Z51.11 ENCOUNTER FOR ANTINEOPLASTIC CHEMOTHERAPY: ICD-10-CM

## 2025-04-04 PROCEDURE — 99214 OFFICE O/P EST MOD 30 MIN: CPT

## 2025-04-07 DIAGNOSIS — D50.8 OTHER IRON DEFICIENCY ANEMIAS: ICD-10-CM

## 2025-04-07 DIAGNOSIS — D69.59 OTHER SECONDARY THROMBOCYTOPENIA: ICD-10-CM

## 2025-04-07 DIAGNOSIS — T45.1X5A ADVERSE EFFECT OF ANTINEOPLASTIC AND IMMUNOSUPPRESSIVE DRUGS, INITIAL ENCOUNTER: ICD-10-CM

## 2025-04-07 DIAGNOSIS — Z51.11 ENCOUNTER FOR ANTINEOPLASTIC CHEMOTHERAPY: ICD-10-CM

## 2025-04-07 DIAGNOSIS — C92.10 CHRONIC MYELOID LEUKEMIA, BCR/ABL-POSITIVE, NOT HAVING ACHIEVED REMISSION: ICD-10-CM

## 2025-06-20 RX ORDER — DASATINIB 70 MG/1
70 TABLET ORAL
Qty: 30 | Refills: 6 | Status: ACTIVE | COMMUNITY
Start: 2025-06-20 | End: 1900-01-01

## 2025-07-01 ENCOUNTER — OUTPATIENT (OUTPATIENT)
Dept: OUTPATIENT SERVICES | Age: 20
LOS: 1 days | Discharge: HOME | End: 2025-07-01

## 2025-07-01 DIAGNOSIS — Q69.9 POLYDACTYLY, UNSPECIFIED: Chronic | ICD-10-CM

## 2025-07-18 ENCOUNTER — APPOINTMENT (OUTPATIENT)
Dept: PEDIATRIC HEMATOLOGY/ONCOLOGY | Facility: CLINIC | Age: 20
End: 2025-07-18

## 2025-07-19 ENCOUNTER — OUTPATIENT (OUTPATIENT)
Dept: OUTPATIENT SERVICES | Facility: HOSPITAL | Age: 20
LOS: 1 days | Discharge: HOME | End: 2025-07-19

## 2025-07-19 DIAGNOSIS — C92.10 CHRONIC MYELOID LEUKEMIA, BCR/ABL-POSITIVE, NOT HAVING ACHIEVED REMISSION: ICD-10-CM

## 2025-07-19 DIAGNOSIS — Q69.9 POLYDACTYLY, UNSPECIFIED: Chronic | ICD-10-CM

## 2025-07-21 ENCOUNTER — NON-APPOINTMENT (OUTPATIENT)
Age: 20
End: 2025-07-21

## 2025-07-21 ENCOUNTER — LABORATORY RESULT (OUTPATIENT)
Age: 20
End: 2025-07-21

## 2025-07-21 ENCOUNTER — RESULT REVIEW (OUTPATIENT)
Age: 20
End: 2025-07-21

## 2025-07-21 ENCOUNTER — APPOINTMENT (OUTPATIENT)
Dept: PEDIATRIC HEMATOLOGY/ONCOLOGY | Facility: CLINIC | Age: 20
End: 2025-07-21

## 2025-07-21 LAB
BASOPHILS # BLD AUTO: 0.04 K/UL — SIGNIFICANT CHANGE UP (ref 0–0.2)
BASOPHILS NFR BLD AUTO: 0.7 % — SIGNIFICANT CHANGE UP (ref 0–2)
EOSINOPHIL # BLD AUTO: 0.14 K/UL — SIGNIFICANT CHANGE UP (ref 0–0.5)
EOSINOPHIL NFR BLD AUTO: 2.3 % — SIGNIFICANT CHANGE UP (ref 0–6)
HCT VFR BLD CALC: 36.1 % — SIGNIFICANT CHANGE UP (ref 34.5–45)
HGB BLD-MCNC: 11.4 G/DL — LOW (ref 11.5–15.5)
IMM GRANULOCYTES NFR BLD AUTO: 0.2 % — SIGNIFICANT CHANGE UP (ref 0–0.9)
LYMPHOCYTES # BLD AUTO: 3.1 K/UL — SIGNIFICANT CHANGE UP (ref 1–3.3)
LYMPHOCYTES # BLD AUTO: 51.4 % — HIGH (ref 13–44)
MCHC RBC-ENTMCNC: 26.8 PG — LOW (ref 27–34)
MCHC RBC-ENTMCNC: 31.6 G/DL — LOW (ref 32–36)
MCV RBC AUTO: 84.9 FL — SIGNIFICANT CHANGE UP (ref 80–100)
MONOCYTES # BLD AUTO: 0.34 K/UL — SIGNIFICANT CHANGE UP (ref 0–0.9)
MONOCYTES NFR BLD AUTO: 5.6 % — SIGNIFICANT CHANGE UP (ref 2–14)
NEUTROPHILS # BLD AUTO: 2.4 K/UL — SIGNIFICANT CHANGE UP (ref 1.8–7.4)
NEUTROPHILS NFR BLD AUTO: 39.8 % — LOW (ref 43–77)
NRBC BLD AUTO-RTO: 0 /100 WBCS — SIGNIFICANT CHANGE UP (ref 0–0)
PLATELET # BLD AUTO: 140 K/UL — LOW (ref 150–400)
PMV BLD: 10.9 FL — SIGNIFICANT CHANGE UP (ref 7–13)
RBC # BLD: 4.25 M/UL — SIGNIFICANT CHANGE UP (ref 3.8–5.2)
RBC # BLD: 4.25 M/UL — SIGNIFICANT CHANGE UP (ref 3.8–5.2)
RBC # FLD: 13.9 % — SIGNIFICANT CHANGE UP (ref 10.3–14.5)
RETICS #: 61.2 K/UL — SIGNIFICANT CHANGE UP (ref 25–125)
RETICS/RBC NFR: 1.4 % — SIGNIFICANT CHANGE UP (ref 0.5–2.5)
WBC # BLD: 6.03 K/UL — SIGNIFICANT CHANGE UP (ref 3.8–10.5)
WBC # FLD AUTO: 6.03 K/UL — SIGNIFICANT CHANGE UP (ref 3.8–10.5)

## 2025-07-22 ENCOUNTER — APPOINTMENT (OUTPATIENT)
Dept: HEMATOLOGY ONCOLOGY | Facility: CLINIC | Age: 20
End: 2025-07-22
Payer: COMMERCIAL

## 2025-07-22 ENCOUNTER — NON-APPOINTMENT (OUTPATIENT)
Age: 20
End: 2025-07-22

## 2025-07-22 VITALS
OXYGEN SATURATION: 99 % | DIASTOLIC BLOOD PRESSURE: 69 MMHG | WEIGHT: 139.99 LBS | RESPIRATION RATE: 18 BRPM | TEMPERATURE: 98.4 F | HEIGHT: 63.58 IN | SYSTOLIC BLOOD PRESSURE: 106 MMHG | BODY MASS INDEX: 24.5 KG/M2 | HEART RATE: 73 BPM

## 2025-07-22 PROCEDURE — 99205 OFFICE O/P NEW HI 60 MIN: CPT

## 2025-07-22 PROCEDURE — G2211 COMPLEX E/M VISIT ADD ON: CPT

## 2025-07-23 DIAGNOSIS — D69.59 OTHER SECONDARY THROMBOCYTOPENIA: ICD-10-CM

## 2025-07-23 DIAGNOSIS — C92.10 CHRONIC MYELOID LEUKEMIA, BCR/ABL-POSITIVE, NOT HAVING ACHIEVED REMISSION: ICD-10-CM

## 2025-07-23 DIAGNOSIS — D50.8 OTHER IRON DEFICIENCY ANEMIAS: ICD-10-CM

## 2025-08-08 ENCOUNTER — RESULT REVIEW (OUTPATIENT)
Age: 20
End: 2025-08-08

## 2025-08-08 ENCOUNTER — APPOINTMENT (OUTPATIENT)
Dept: PEDIATRIC HEMATOLOGY/ONCOLOGY | Facility: CLINIC | Age: 20
End: 2025-08-08
Payer: COMMERCIAL

## 2025-08-08 VITALS
TEMPERATURE: 98.42 F | BODY MASS INDEX: 23.85 KG/M2 | OXYGEN SATURATION: 100 % | HEART RATE: 78 BPM | SYSTOLIC BLOOD PRESSURE: 106 MMHG | DIASTOLIC BLOOD PRESSURE: 67 MMHG | HEIGHT: 63.82 IN | WEIGHT: 138.01 LBS | RESPIRATION RATE: 20 BRPM

## 2025-08-08 DIAGNOSIS — Z92.89 PERSONAL HISTORY OF OTHER MEDICAL TREATMENT: ICD-10-CM

## 2025-08-08 DIAGNOSIS — D70.1 AGRANULOCYTOSIS SECONDARY TO CANCER CHEMOTHERAPY: ICD-10-CM

## 2025-08-08 DIAGNOSIS — Z87.898 PERSONAL HISTORY OF OTHER SPECIFIED CONDITIONS: ICD-10-CM

## 2025-08-08 DIAGNOSIS — T45.1X5A AGRANULOCYTOSIS SECONDARY TO CANCER CHEMOTHERAPY: ICD-10-CM

## 2025-08-08 DIAGNOSIS — T45.1X5A OTHER SECONDARY THROMBOCYTOPENIA: ICD-10-CM

## 2025-08-08 DIAGNOSIS — D69.59 OTHER SECONDARY THROMBOCYTOPENIA: ICD-10-CM

## 2025-08-08 DIAGNOSIS — Z13.6 ENCOUNTER FOR SCREENING FOR CARDIOVASCULAR DISORDERS: ICD-10-CM

## 2025-08-08 DIAGNOSIS — Z86.79 PERSONAL HISTORY OF OTHER DISEASES OF THE CIRCULATORY SYSTEM: ICD-10-CM

## 2025-08-08 DIAGNOSIS — Z71.3 DIETARY COUNSELING AND SURVEILLANCE: ICD-10-CM

## 2025-08-08 DIAGNOSIS — D50.8 OTHER IRON DEFICIENCY ANEMIAS: ICD-10-CM

## 2025-08-08 DIAGNOSIS — C92.10 CHRONIC MYELOID LEUKEMIA, BCR/ABL-POSITIVE, NOT HAVING ACHIEVED REMISSION: ICD-10-CM

## 2025-08-08 DIAGNOSIS — Z51.11 ENCOUNTER FOR ANTINEOPLASTIC CHEMOTHERAPY: ICD-10-CM

## 2025-08-08 LAB
ALBUMIN SERPL ELPH-MCNC: 4.5 G/DL
ALP BLD-CCNC: 72 U/L
ALT SERPL-CCNC: 22 U/L
ANION GAP SERPL CALC-SCNC: 12 MMOL/L
AST SERPL-CCNC: 26 U/L
BASOPHILS # BLD AUTO: 0.03 K/UL — SIGNIFICANT CHANGE UP (ref 0–0.2)
BASOPHILS NFR BLD AUTO: 0.6 % — SIGNIFICANT CHANGE UP (ref 0–2)
BILIRUB SERPL-MCNC: 0.6 MG/DL
BUN SERPL-MCNC: 8 MG/DL
CALCIUM SERPL-MCNC: 9.1 MG/DL
CHLORIDE SERPL-SCNC: 104 MMOL/L
CO2 SERPL-SCNC: 22 MMOL/L
CREAT SERPL-MCNC: 0.59 MG/DL
EGFRCR SERPLBLD CKD-EPI 2021: 131 ML/MIN/1.73M2
EOSINOPHIL # BLD AUTO: 0.12 K/UL — SIGNIFICANT CHANGE UP (ref 0–0.5)
EOSINOPHIL NFR BLD AUTO: 2.3 % — SIGNIFICANT CHANGE UP (ref 0–6)
FERRITIN SERPL-MCNC: 9 NG/ML
GLUCOSE SERPL-MCNC: 90 MG/DL
HCT VFR BLD CALC: 34.6 % — SIGNIFICANT CHANGE UP (ref 34.5–45)
HGB BLD-MCNC: 10.9 G/DL — LOW (ref 11.5–15.5)
IMM GRANULOCYTES NFR BLD AUTO: 4.1 % — HIGH (ref 0–0.9)
LYMPHOCYTES # BLD AUTO: 3.17 K/UL — SIGNIFICANT CHANGE UP (ref 1–3.3)
LYMPHOCYTES # BLD AUTO: 59.7 % — HIGH (ref 13–44)
MCHC RBC-ENTMCNC: 26.5 PG — LOW (ref 27–34)
MCHC RBC-ENTMCNC: 31.5 G/DL — LOW (ref 32–36)
MCV RBC AUTO: 84.2 FL — SIGNIFICANT CHANGE UP (ref 80–100)
MONOCYTES # BLD AUTO: 0.36 K/UL — SIGNIFICANT CHANGE UP (ref 0–0.9)
MONOCYTES NFR BLD AUTO: 6.8 % — SIGNIFICANT CHANGE UP (ref 2–14)
NEUTROPHILS # BLD AUTO: 1.41 K/UL — LOW (ref 1.8–7.4)
NEUTROPHILS NFR BLD AUTO: 26.5 % — LOW (ref 43–77)
NRBC BLD AUTO-RTO: 0 /100 WBCS — SIGNIFICANT CHANGE UP (ref 0–0)
PLATELET # BLD AUTO: 146 K/UL — LOW (ref 150–400)
PMV BLD: 11.1 FL — SIGNIFICANT CHANGE UP (ref 7–13)
POTASSIUM SERPL-SCNC: 4.5 MMOL/L
PROT SERPL-MCNC: 7.1 G/DL
RBC # BLD: 4.11 M/UL — SIGNIFICANT CHANGE UP (ref 3.8–5.2)
RBC # BLD: 4.11 M/UL — SIGNIFICANT CHANGE UP (ref 3.8–5.2)
RBC # FLD: 14.4 % — SIGNIFICANT CHANGE UP (ref 10.3–14.5)
RETICS #: 44.4 K/UL — SIGNIFICANT CHANGE UP (ref 25–125)
RETICS/RBC NFR: 1.1 % — SIGNIFICANT CHANGE UP (ref 0.5–2.5)
SODIUM SERPL-SCNC: 138 MMOL/L
WBC # BLD: 5.31 K/UL — SIGNIFICANT CHANGE UP (ref 3.8–10.5)
WBC # FLD AUTO: 5.31 K/UL — SIGNIFICANT CHANGE UP (ref 3.8–10.5)

## 2025-08-08 PROCEDURE — 99213 OFFICE O/P EST LOW 20 MIN: CPT

## 2025-08-11 PROBLEM — Z71.3 DIETARY COUNSELING: Status: RESOLVED | Noted: 2023-04-05 | Resolved: 2025-08-11

## 2025-08-11 PROBLEM — Z13.6 ENCOUNTER FOR SCREENING FOR CARDIOVASCULAR DISORDERS: Status: RESOLVED | Noted: 2023-06-16 | Resolved: 2025-08-11

## 2025-08-11 PROBLEM — Z92.89 HISTORY OF RECENT HOSPITALIZATION: Status: RESOLVED | Noted: 2023-06-16 | Resolved: 2025-08-11

## 2025-08-11 PROBLEM — Z86.79 HISTORY OF CARDIAC MURMUR: Status: RESOLVED | Noted: 2023-06-16 | Resolved: 2025-08-11

## 2025-08-11 PROBLEM — Z87.898 HISTORY OF SHORTNESS OF BREATH: Status: RESOLVED | Noted: 2023-06-16 | Resolved: 2025-08-11

## 2025-08-14 ENCOUNTER — APPOINTMENT (OUTPATIENT)
Dept: INFUSION THERAPY | Facility: HOSPITAL | Age: 20
End: 2025-08-14